# Patient Record
Sex: FEMALE | Race: BLACK OR AFRICAN AMERICAN | Employment: OTHER | URBAN - METROPOLITAN AREA
[De-identification: names, ages, dates, MRNs, and addresses within clinical notes are randomized per-mention and may not be internally consistent; named-entity substitution may affect disease eponyms.]

---

## 2017-02-21 ENCOUNTER — OFFICE VISIT (OUTPATIENT)
Dept: PAIN MANAGEMENT | Age: 82
End: 2017-02-21

## 2017-02-21 VITALS
BODY MASS INDEX: 24.37 KG/M2 | HEART RATE: 92 BPM | SYSTOLIC BLOOD PRESSURE: 125 MMHG | WEIGHT: 151 LBS | DIASTOLIC BLOOD PRESSURE: 69 MMHG

## 2017-02-21 DIAGNOSIS — M51.37 DEGENERATION OF LUMBAR OR LUMBOSACRAL INTERVERTEBRAL DISC: ICD-10-CM

## 2017-02-21 DIAGNOSIS — M47.817 LUMBOSACRAL SPONDYLOSIS WITHOUT MYELOPATHY: ICD-10-CM

## 2017-02-21 DIAGNOSIS — Z71.89 COUNSELING AND COORDINATION OF CARE: Primary | ICD-10-CM

## 2017-02-21 DIAGNOSIS — M15.9 OSTEOARTHRITIS OF MULTIPLE JOINTS, UNSPECIFIED OSTEOARTHRITIS TYPE: ICD-10-CM

## 2017-02-21 DIAGNOSIS — G89.29 CHRONIC LOW BACK PAIN, UNSPECIFIED BACK PAIN LATERALITY, WITH SCIATICA PRESENCE UNSPECIFIED: Primary | ICD-10-CM

## 2017-02-21 DIAGNOSIS — M25.50 POLYARTHRALGIA: ICD-10-CM

## 2017-02-21 DIAGNOSIS — M43.16 SPONDYLOLISTHESIS OF LUMBAR REGION: ICD-10-CM

## 2017-02-21 DIAGNOSIS — M96.1 POSTLAMINECTOMY SYNDROME, LUMBAR REGION: ICD-10-CM

## 2017-02-21 DIAGNOSIS — M54.5 CHRONIC LOW BACK PAIN, UNSPECIFIED BACK PAIN LATERALITY, WITH SCIATICA PRESENCE UNSPECIFIED: Primary | ICD-10-CM

## 2017-02-21 DIAGNOSIS — M51.36 DDD (DEGENERATIVE DISC DISEASE), LUMBAR: ICD-10-CM

## 2017-02-21 DIAGNOSIS — M77.8 SUBSCAPULARIS TENDINITIS OF RIGHT SHOULDER: ICD-10-CM

## 2017-02-21 DIAGNOSIS — M46.1 SACROILIITIS (HCC): ICD-10-CM

## 2017-02-21 RX ORDER — MORPHINE SULFATE 30 MG/1
15-30 TABLET ORAL
Qty: 90 TAB | Refills: 0 | Status: SHIPPED | OUTPATIENT
Start: 2017-03-27 | End: 2017-04-17 | Stop reason: SDUPTHER

## 2017-02-21 RX ORDER — MORPHINE SULFATE 30 MG/1
15-30 TABLET ORAL
Qty: 90 TAB | Refills: 0 | Status: SHIPPED | OUTPATIENT
Start: 2017-02-25 | End: 2017-04-17 | Stop reason: SDUPTHER

## 2017-02-21 RX ORDER — FENTANYL 50 UG/1
1 PATCH TRANSDERMAL
Qty: 10 PATCH | Refills: 0 | Status: SHIPPED | OUTPATIENT
Start: 2017-03-15 | End: 2017-04-17 | Stop reason: SDUPTHER

## 2017-02-21 RX ORDER — FENTANYL 50 UG/1
1 PATCH TRANSDERMAL
Qty: 10 PATCH | Refills: 0 | Status: SHIPPED | OUTPATIENT
Start: 2017-04-14 | End: 2017-04-17 | Stop reason: SDUPTHER

## 2017-02-21 NOTE — PROGRESS NOTES
Nursing Notes    Patient presents to the office today in follow-up. Patient rates her pain at 4/10 on the numerical pain scale. Reviewed medications with counts as follows:    Rx Date filled Qty Dispensed Pill Count Last Dose Short     Fentanyl 50mcg 02/13/17 10 8 02/20/17 No    Morphine sulfate 30mg IR 01/26/17 90 14 This am  No                                     Comments:     POC UDS was not performed in office today    Any new labs or imaging since last appointment? NO    Have you been to an emergency room (ER) or urgent care clinic since your last visit? NO            Have you been hospitalized since your last visit? NO     If yes, where, when, and reason for visit? Have you seen or consulted any other health care providers outside of the [address] [place] [address]  since your last visit? NO     If yes, where, when, and reason for visit? HM deferred to pcp.

## 2017-02-21 NOTE — PROGRESS NOTES
Ms. Mendy Mccall attended the Education Class facilitated by Shaq Hines RN Clinical Supervisor. Objectives of this class are to review practice policies, protocols and the Controlled Substances Consent and Treatment Agreement, discuss \"what if\" scenarios, introduce staff, and provide an opportunity for questions and answers. Ultimately, goals for this class are for Ms. Kemp to:    · Be educated - Learn as much as possible about her pain and related treatment, our policies and the Controlled Substances Agreement. · Be responsible - Follow the providers advice regarding treatment recommendations, medications, and prescription information. · Be confident - Better manage her pain and return to a more functional lifestyle. At least 45 minutes was spent with the patient in face-to-face contact today.

## 2017-02-21 NOTE — PATIENT INSTRUCTIONS
1. Continue current plan with no evidence of addiction or diversion. Stable on current medication without adverse events. 2. Refill fentanyl 50 µg patch every 72 hours. Refill morphine IR 30 mg. Take half to 1 tablet up to 3 times daily as needed. 3. Discussed risks of addiction, dependency, and opioid induced hyperalgesia.    4. Return to clinic in 2 months with Dr. Marilyn Prabhakar

## 2017-02-21 NOTE — PROGRESS NOTES
HISTORY OF PRESENT ILLNESS  Ade Ribeiro is a 80 y.o. female    HPI: Ms. Leann Martines  returns today for f/u of chronic low back pain and multiple joint pain. 2 Prior lumbar surgeries. Lumbar epidural injections in the past with good improvement but temporarily. PT in the past with some improvement. Today is my first visit with Ms. Kemp. She is here today with her son. She is doing well with her current treatment plan with no new issues today. We will continue with her treatment plan unchanged. I will have her follow-up in 2 months for reassessment. Current medication management consists of Fentanyl 50 µg patch every 72 hours and morphine IR 30 mg 3 times daily as needed   Medications are helping with pain control and quality of life. Her pain is 5-6/10 with medication and 8/10 without. Pt describes pain as aching. Aggravating factors include standing and walking. Relieved with rest, medication, and avoiding painful activities. Current treatment is helping to improve general activity, mood, walking, sleep, enjoyment of life    In the past 30 days, the patient reports approximately 30-40% pain relief with current treatment/medications. She  is otherwise doing well with no other complaints today. She denies any adverse events including nausea, vomiting, dizziness, constipation, hallucinations, or seizures. Allergies   Allergen Reactions    Aspirin Nausea Only       Past Surgical History   Procedure Laterality Date    Hx bunionectomy       LEFT    Hx lumbar laminectomy  1994    Hx tonsil and adenoidectomy      Hx carpal tunnel release  2000    Hx cholecystectomy  12/09     GALLSTONE PANCREATITIS    Colonoscopy  5/29/12     Dr. Monica Reynaga, single polyp, 5 yr f/u         Review of Systems   Constitutional: Negative for chills, fever and weight loss. HENT: Negative for congestion and sore throat. Eyes: Negative for blurred vision and double vision.    Respiratory: Negative for cough, shortness of breath and wheezing. Cardiovascular: Negative for chest pain and palpitations. Gastrointestinal: Negative for constipation, diarrhea, heartburn, nausea and vomiting. Genitourinary: Negative. Musculoskeletal: Positive for back pain and joint pain. Negative for falls and neck pain. Neurological: Negative for dizziness, seizures, loss of consciousness and headaches. Endo/Heme/Allergies: Does not bruise/bleed easily. Psychiatric/Behavioral: Negative for depression. The patient is not nervous/anxious and does not have insomnia. Physical Exam   Constitutional: She is oriented to person, place, and time and well-developed, well-nourished, and in no distress. No distress. HENT:   Head: Normocephalic and atraumatic. Eyes: EOM are normal.   Pulmonary/Chest: Effort normal.   Neurological: She is alert and oriented to person, place, and time. Gait (using a walker) abnormal.   Skin: Skin is warm and dry. No rash noted. She is not diaphoretic. No erythema. Psychiatric: Mood, memory, affect and judgment normal.   Nursing note and vitals reviewed. ASSESSMENT:    1. Chronic low back pain, unspecified back pain laterality, with sciatica presence unspecified    2. Degeneration of lumbar or lumbosacral intervertebral disc    3. DDD (degenerative disc disease), lumbar    4. Osteoarthritis of multiple joints, unspecified osteoarthritis type    5. Lumbosacral spondylosis without myelopathy    6. Polyarthralgia    7. Postlaminectomy syndrome, lumbar region    8. Sacroiliitis (HCC)    9. Spondylolisthesis of lumbar region    10. Subscapularis tendinitis of right shoulder           [place] Prescription Monitoring Program was reviewed which does not demonstrate aberrancies and/or inconsistencies with regard to the historical prescribing of controlled medications to this patient by other providers. PLAN / Pt Instructions:  1.  Continue current plan with no evidence of addiction or diversion. Stable on current medication without adverse events. 2. Refill fentanyl 50 µg patch every 72 hours. Refill morphine IR 30 mg. Take half to 1 tablet up to 3 times daily as needed. 3. Discussed risks of addiction, dependency, and opioid induced hyperalgesia. 4. Return to clinic in 2 months with Dr. Justine Stephenson    Medications Ordered Today   Medications    fentaNYL (DURAGESIC) 50 mcg/hr PATCH     Si Patch by TransDERmal route every seventy-two (72) hours for 30 days. Max Daily Amount: 1 Patch. For chronic pain     Dispense:  10 Patch     Refill:  0    fentaNYL (DURAGESIC) 50 mcg/hr PATCH     Si Patch by TransDERmal route every seventy-two (72) hours. Max Daily Amount: 1 Patch. For chronic pain     Dispense:  10 Patch     Refill:  0    morphine IR (MS IR) 30 mg tablet     Sig: Take 0.5-1 Tabs by mouth three (3) times daily as needed for Pain (rescue) for up to 30 days. Max Daily Amount: 90 mg. Dispense:  90 Tab     Refill:  0    morphine IR (MS IR) 30 mg tablet     Sig: Take 0.5-1 Tabs by mouth three (3) times daily as needed for Pain (rescue) for up to 30 days. Max Daily Amount: 90 mg. Dispense:  90 Tab     Refill:  0       Pain medications prescribed with the objective of pain relief and improved physical and psychosocial function in this patient. Spent 25 minutes with patient today reviewing the treatment plan, goals of treatment plan, and limitations of the treatment plan, to include the potential for side effects from medications and procedures. Roger Yo, [address] 2017      Note: Please excuse any typographical errors. Voice recognition software was used for this note and may cause mistakes.

## 2017-02-21 NOTE — MR AVS SNAPSHOT
Visit Information Date & Time Provider Department Dept. Phone Encounter #  
 2/21/2017  1:20 PM Roegr Yo, [facility] [address] for Pain Management [phone] [id number] Follow-up Instructions Return in about 2 months (around 4/21/2017). Your Appointments 3/20/2017 10:00 AM  
Office Visit with Milagros Christy MD  
[facility] Appt Note: 5 month f/u MWV, HTN, DM, chol  
 [address]  
  
   
 [address] [facility] Upcoming Health Maintenance Date Due  
 MEDICARE YEARLY EXAM 1/23/2016 EYE EXAM RETINAL OR DILATED Q1 1/13/2017 ZOSTER VACCINE AGE 60> 4/5/2017* HEMOGLOBIN A1C Q6M 4/19/2017 MICROALBUMIN Q1 10/19/2017 LIPID PANEL Q1 10/19/2017 GLAUCOMA SCREENING Q2Y 1/13/2018 DTaP/Tdap/Td series (2 - Td) 7/5/2022 *Topic was postponed. The date shown is not the original due date. Allergies as of 2/21/2017  Review Complete On: 2/21/2017 By: Santos Tejada LPN Severity Noted Reaction Type Reactions Aspirin    Nausea Only Current Immunizations  Reviewed on 10/6/2015 Name Date Influenza High Dose Vaccine PF 10/19/2016, 10/6/2015 12:25 PM  
 Influenza Vaccine 9/25/2014 Influenza Vaccine Split 10/25/2012  1:24 PM  
 Pneumococcal Conjugate (PCV-13) 10/19/2016  3:50 PM  
 Pneumococcal Vaccine (Unspecified Type) 11/18/2009 TDAP Vaccine 7/5/2012 Not reviewed this visit You Were Diagnosed With   
  
 Codes Comments Chronic low back pain, unspecified back pain laterality, with sciatica presence unspecified    -  Primary ICD-10-CM: M54.5, G89.29 ICD-9-CM: 724.2, 338.29  Degeneration of lumbar or lumbosacral intervertebral disc     ICD-10-CM: M51.37 
ICD-9-CM: 722.52   
 DDD (degenerative disc disease), lumbar     ICD-10-CM: M51.36 
ICD-9-CM: 722.52   
 Osteoarthritis of multiple joints, unspecified osteoarthritis type     ICD-10-CM: M15.9 ICD-9-CM: 715.89 Lumbosacral spondylosis without myelopathy     ICD-10-CM: N44.398 ICD-9-CM: 721.3 Polyarthralgia     ICD-10-CM: M25.50 ICD-9-CM: 719.49 Postlaminectomy syndrome, lumbar region     ICD-10-CM: M96.1 ICD-9-CM: 722.83 Sacroiliitis ([address]     ICD-10-CM: M46.1 ICD-9-CM: 720.2 Spondylolisthesis of lumbar region     ICD-10-CM: M43.16 
ICD-9-CM: 738.4 Subscapularis tendinitis of right shoulder     ICD-10-CM: M75.81 ICD-9-CM: 726.10 Vitals BP Pulse Weight(growth percentile) BMI OB Status Smoking Status 125/69 (BP 1 Location: Left arm, BP Patient Position: Sitting) 92 151 lb (68.5 kg) 24.37 kg/m2 Postmenopausal Never Smoker BMI and BSA Data Body Mass Index Body Surface Area  
 24.37 kg/m 2 1.79 m 2 Preferred Pharmacy Pharmacy Name Phone [facility] [address], [address] [phone] Your Updated Medication List  
  
   
This list is accurate as of: 2/21/17  1:53 PM.  Always use your most recent med list. amLODIPine 10 mg tablet Commonly known as:  Maritzaene Post Take 1 Tab by mouth daily. Blood-Glucose Meter monitoring kit Commonly known as:  Alber Hubbard Use to test blood sugar daily or as directed  
  
 cromolyn 4 % ophthalmic solution Commonly known as:  OPTICROM Administer 1 Drop to both eyes. diclofenac 3 % topical gel Commonly known as:  Tony Blackwell Apply  to affected area two (2) times daily as needed for Pain.  
  
 ergocalciferol 50,000 unit capsule Commonly known as:  VITAMIN D2 Take 1 Cap by mouth every seven (7) days. * fentaNYL 50 mcg/hr PATCH Commonly known as:  DURAGESIC  
1 Patch by TransDERmal route every seventy-two (72) hours. Max Daily Amount: 1 Patch. For chronic pain  * fentaNYL 50 mcg/hr PATCH  
 Commonly known as:  DURAGESIC  
1 Patch by TransDERmal route every seventy-two (72) hours for 30 days. Max Daily Amount: 1 Patch. For chronic pain Start taking on:  3/15/2017 * fentaNYL 50 mcg/hr PATCH Commonly known as:  DURAGESIC  
1 Patch by TransDERmal route every seventy-two (72) hours. Max Daily Amount: 1 Patch. For chronic pain Start taking on:  4/14/2017  
  
 furosemide 40 mg tablet Commonly known as:  LASIX Take 1 Tab by mouth daily. HYDROcodone-acetaminophen 5-325 mg per tablet Commonly known as:  Thelbert [place] Take 1 Tab by Mouth Every 4 Hours As Needed for Pain. JANUVIA 50 mg tablet Generic drug:  SITagliptin TAKE 1 TABLET BY MOUTH EVERY DAY Lancets Misc Commonly known as:  ONETOUCH ULTRASOFT LANCETS Use to test blood sugar daily or as directed  
  
 lidocaine 5 % ointment Commonly known as:  XYLOCAINE  
USE DAILY AS NEEDED FOR DRESSING CHANGES * morphine IR 30 mg tablet Commonly known as:  MS IR Take 0.5-1 Tabs by mouth three (3) times daily as needed for Pain (rescue) for up to 30 days. Max Daily Amount: 90 mg.  
  
 * morphine IR 30 mg tablet Commonly known as:  MS IR Take 0.5-1 Tabs by mouth three (3) times daily as needed for Pain (rescue) for up to 30 days. Max Daily Amount: 90 mg. Start taking on:  2/25/2017 * morphine IR 30 mg tablet Commonly known as:  MS IR Take 0.5-1 Tabs by mouth three (3) times daily as needed for Pain (rescue) for up to 30 days. Max Daily Amount: 90 mg. Start taking on:  3/27/2017  
  
 mupirocin calcium 2 % topical cream  
Commonly known as:  [employer] [facility] Apply  to affected area two (2) times a day. omeprazole 40 mg capsule Commonly known as:  PRILOSEC Take 1 Cap by mouth daily. ONETOUCH ULTRA TEST strip Generic drug:  glucose blood VI test strips TEST BLOOD SUGAR DAILY OR AS DIRECTED  
  
 potassium chloride 20 mEq tablet Commonly known as:  K-DUR, KLOR-CON  
 TAKE 1 TABLET BY MOUTH TWICE DAILY  
  
 rivastigmine 4.6 mg/24 hr patch Commonly known as:  EXELON  
1 Patch by TransDERmal route daily. tolterodine ER 4 mg ER capsule Commonly known as:  Bloomer Phan Take 1 Cap by mouth daily. ursodiol 300 mg capsule Commonly known as:  ACTIGALL TAKE 1 CAPSULE BY MOUTH TWICE DAILY  
  
 valsartan-hydroCHLOROthiazide 320-25 mg per tablet Commonly known as:  DIOVAN-HCT  
TAKE 1 TABLET BY MOUTH DAILY * Notice: This list has 6 medication(s) that are the same as other medications prescribed for you. Read the directions carefully, and ask your doctor or other care provider to review them with you. Prescriptions Printed Refills  
 fentaNYL (DURAGESIC) 50 mcg/hr PATCH 0 Starting on: 3/15/2017 Si Patch by TransDERmal route every seventy-two (72) hours for 30 days. Max Daily Amount: 1 Patch. For chronic pain  
 Class: Print Route: TransDERmal  
 fentaNYL (DURAGESIC) 50 mcg/hr PATCH 0 Starting on: 2017 Si Patch by TransDERmal route every seventy-two (72) hours. Max Daily Amount: 1 Patch. For chronic pain  
 Class: Print Route: TransDERmal  
 morphine IR (MS IR) 30 mg tablet 0 Starting on: 2017 Sig: Take 0.5-1 Tabs by mouth three (3) times daily as needed for Pain (rescue) for up to 30 days. Max Daily Amount: 90 mg.  
 Class: Print Route: Oral  
 morphine IR (MS IR) 30 mg tablet 0 Starting on: 3/27/2017 Sig: Take 0.5-1 Tabs by mouth three (3) times daily as needed for Pain (rescue) for up to 30 days. Max Daily Amount: 90 mg.  
 Class: Print Route: Oral  
  
Follow-up Instructions Return in about 2 months (around 2017). Patient Instructions 1. Continue current plan with no evidence of addiction or diversion. Stable on current medication without adverse events. 2. Refill fentanyl 50 µg patch every 72 hours. Refill morphine IR 30 mg. Take half to 1 tablet up to 3 times daily as needed. 3. Discussed risks of addiction, dependency, and opioid induced hyperalgesia. 4. Return to clinic in 2 months with Dr. Rodger Monteiro Introducing [facility]! Jessica Linda introduces [employer] patient portal. Now you can access parts of your medical record, email your doctor's office, and request medication refills online. 1. In your internet browser, go to [email or web address] 2. Click on the First Time User? Click Here link in the Sign In box. You will see the New Member Sign Up page. 3. Enter your [employer] Access Code exactly as it appears below. You will not need to use this code after youve completed the sign-up process. If you do not sign up before the expiration date, you must request a new code. · [employer] Access Code: [email or web address] Expires: 5/22/2017  1:53 PM 
 
4. Enter the last four digits of your Social Security Number (xxxx) and Date of Birth (mm/dd/yyyy) as indicated and click Submit. You will be taken to the next sign-up page. 5. Create a [employer] ID. This will be your [employer] login ID and cannot be changed, so think of one that is secure and easy to remember. 6. Create a [employer] password. You can change your password at any time. 7. Enter your Password Reset Question and Answer. This can be used at a later time if you forget your password. 8. Enter your e-mail address. You will receive e-mail notification when new information is available in [address] 9. Click Sign Up. You can now view and download portions of your medical record. 10. Click the Download Summary menu link to download a portable copy of your medical information. If you have questions, please visit the Frequently Asked Questions section of the [employer] website. Remember, [employer] is NOT to be used for urgent needs. For medical emergencies, dial 911. Now available from your iPhone and Android! Please provide this summary of care documentation to your next provider. Your primary care clinician is listed as [facility]. If you have any questions after today's visit, please call [phone].

## 2017-03-20 ENCOUNTER — OFFICE VISIT (OUTPATIENT)
Dept: INTERNAL MEDICINE CLINIC | Age: 82
End: 2017-03-20

## 2017-03-20 VITALS
HEART RATE: 74 BPM | SYSTOLIC BLOOD PRESSURE: 136 MMHG | RESPIRATION RATE: 18 BRPM | HEIGHT: 66 IN | BODY MASS INDEX: 24.4 KG/M2 | DIASTOLIC BLOOD PRESSURE: 66 MMHG | OXYGEN SATURATION: 96 % | WEIGHT: 151.8 LBS | TEMPERATURE: 97.5 F

## 2017-03-20 DIAGNOSIS — I10 ESSENTIAL HYPERTENSION: Chronic | ICD-10-CM

## 2017-03-20 DIAGNOSIS — H91.92 HEARING LOSS, LEFT: ICD-10-CM

## 2017-03-20 DIAGNOSIS — Z76.0 MEDICATION REFILL: ICD-10-CM

## 2017-03-20 DIAGNOSIS — Z13.39 SCREENING FOR ALCOHOLISM: ICD-10-CM

## 2017-03-20 DIAGNOSIS — E11.9 TYPE 2 DIABETES MELLITUS WITHOUT COMPLICATION, WITHOUT LONG-TERM CURRENT USE OF INSULIN (HCC): Chronic | ICD-10-CM

## 2017-03-20 DIAGNOSIS — E78.00 HYPERCHOLESTEREMIA: Chronic | ICD-10-CM

## 2017-03-20 DIAGNOSIS — Z00.00 ROUTINE GENERAL MEDICAL EXAMINATION AT A HEALTH CARE FACILITY: Primary | ICD-10-CM

## 2017-03-20 DIAGNOSIS — H61.22 IMPACTED CERUMEN OF LEFT EAR: ICD-10-CM

## 2017-03-20 LAB
GLUCOSE POC: 119 MG/DL
HBA1C MFR BLD HPLC: 5.6 %

## 2017-03-20 RX ORDER — AMLODIPINE BESYLATE 10 MG/1
10 TABLET ORAL DAILY
Qty: 30 TAB | Refills: 11 | Status: SHIPPED | OUTPATIENT
Start: 2017-03-20 | End: 2017-05-16 | Stop reason: SDUPTHER

## 2017-03-20 RX ORDER — OMEPRAZOLE 40 MG/1
40 CAPSULE, DELAYED RELEASE ORAL DAILY
Qty: 30 CAP | Refills: 11 | Status: SHIPPED | OUTPATIENT
Start: 2017-03-20 | End: 2018-04-02 | Stop reason: SDUPTHER

## 2017-03-20 RX ORDER — RIVASTIGMINE 4.6 MG/24H
1 PATCH, EXTENDED RELEASE TRANSDERMAL DAILY
Qty: 30 PATCH | Refills: 11 | Status: SHIPPED | OUTPATIENT
Start: 2017-03-20 | End: 2017-05-16 | Stop reason: SDUPTHER

## 2017-03-20 RX ORDER — FUROSEMIDE 40 MG/1
40 TABLET ORAL DAILY
Qty: 30 TAB | Refills: 11 | Status: SHIPPED | OUTPATIENT
Start: 2017-03-20 | End: 2018-07-18 | Stop reason: SDUPTHER

## 2017-03-20 RX ORDER — TOLTERODINE 4 MG/1
4 CAPSULE, EXTENDED RELEASE ORAL DAILY
Qty: 30 CAP | Refills: 11 | Status: SHIPPED | OUTPATIENT
Start: 2017-03-20 | End: 2017-05-13 | Stop reason: SDUPTHER

## 2017-03-20 RX ORDER — ERGOCALCIFEROL 1.25 MG/1
50000 CAPSULE ORAL
Qty: 4 CAP | Refills: 11 | Status: SHIPPED | OUTPATIENT
Start: 2017-03-20 | End: 2018-04-02 | Stop reason: SDUPTHER

## 2017-03-20 NOTE — PROGRESS NOTES
Chief Complaint   Patient presents with    Annual Wellness Visit    Hypertension    Diabetes    Cholesterol Problem       Pt preferred language for health care discussion is english. Is someone accompanying this pt? son    Is the patient using any DME equipment during [address]? Wheeled seated wlaker    Depression Screening completed. Yes    Learning Assessment completed. Yes    Abuse Screening completed. Yes    Health Maintenance reviewed and discussed per provider. Yes    Pt is due for Eye exam.  Please order/place referral if appropriate. Advance Directive:  1. Do you have an advance directive in place? Patient Reply:no    2. If not, would you like material regarding how to put one in place? Patient Reply: No    Coordination of Care:  1. Have you been to the ER, urgent care clinic since your last visit? Hospitalized since your last visit? no    2. Have you seen or consulted any other health care providers outside of the [address] [place] [address] since your last visit? Include any pap smears or colon screening.  no

## 2017-03-20 NOTE — MR AVS SNAPSHOT
Visit Information Date & Time Provider Department Dept. Phone Encounter #  
 3/20/2017 10:00 AM Maday MarieNaveed [facility] [phone] [id number] Follow-up Instructions Return in about 6 months (around 9/20/2017) for HTN, DM, cholesterol. Your Appointments 4/17/2017 10:45 AM  
Follow Up with Mya Burrows MD  
[facility] [address] [facility]) Appt Note: 2 MON F/U WITH GS PER RC. ...2/21/17. ...to  
 Tamara and Raghav Kari [id number]  
[phone] [facility] [id number] Upcoming Health Maintenance Date Due  
 MEDICARE YEARLY EXAM 1/23/2016 EYE EXAM RETINAL OR DILATED Q1 1/13/2017 HEMOGLOBIN A1C Q6M 4/19/2017 ZOSTER VACCINE AGE 60> 4/5/2017* MICROALBUMIN Q1 10/19/2017 GLAUCOMA SCREENING Q2Y 1/13/2018 LIPID PANEL Q1 3/7/2018 DTaP/Tdap/Td series (2 - Td) 7/5/2022 *Topic was postponed. The date shown is not the original due date. Allergies as of 3/20/2017  Review Complete On: 3/20/2017 By: Maday Salazar MD  
  
 Severity Noted Reaction Type Reactions Aspirin    Nausea Only Current Immunizations  Reviewed on 10/6/2015 Name Date Influenza High Dose Vaccine PF 10/19/2016, 10/6/2015 12:25 PM  
 Influenza Vaccine 9/25/2014 Influenza Vaccine Split 10/25/2012  1:24 PM  
 Pneumococcal Conjugate (PCV-13) 10/19/2016  3:50 PM  
 Pneumococcal Vaccine (Unspecified Type) 11/18/2009 TDAP Vaccine 7/5/2012 Not reviewed this visit You Were Diagnosed With   
  
 Codes Comments Routine general medical examination at a health care facility    -  Primary ICD-10-CM: Z00.00 ICD-9-CM: V70.0 Screening for alcoholism     ICD-10-CM: Z13.89 ICD-9-CM: V79.1 Type 2 diabetes mellitus without complication, without long-term current use of insulin (HCC)     ICD-10-CM: E11.9 ICD-9-CM: 250.00 Essential hypertension     ICD-10-CM: I10 
ICD-9-CM: 401.9 Hypercholesteremia     ICD-10-CM: E78.00 ICD-9-CM: 272.0 Medication refill     ICD-10-CM: Z76.0 ICD-9-CM: V68.1 Impacted cerumen of left ear     ICD-10-CM: H61.22 
ICD-9-CM: 380.4 Hearing loss, left     ICD-10-CM: H91.92 
ICD-9-CM: 389. 9 Vitals BP Pulse Temp Resp Height(growth percentile) Weight(growth percentile) 136/66 74 97.5 °F (36.4 °C) (Oral) 18 5' 6\" (1.676 m) 151 lb 12.8 oz (68.9 kg) SpO2 BMI OB Status Smoking Status 96% 24.5 kg/m2 Postmenopausal Never Smoker BMI and BSA Data Body Mass Index Body Surface Area 24.5 kg/m 2 1.79 m 2 Preferred Pharmacy Pharmacy Name [facility] [phone] Your Updated Medication List  
  
   
This list is accurate as of: 3/20/17 11:35 AM.  Always use your most recent med list. amLODIPine 10 mg tablet Commonly known as:  Mary Jo Tello Take 1 Tab by mouth daily. Blood-Glucose Meter monitoring kit Commonly known as:  Brice Stacy Use to test blood sugar daily or as directed  
  
 cromolyn 4 % ophthalmic solution Commonly known as:  OPTICROM Administer 1 Drop to both eyes. diclofenac 3 % topical gel Commonly known as:  Diego Stacy Apply  to affected area two (2) times daily as needed for Pain.  
  
 ergocalciferol 50,000 unit capsule Commonly known as:  VITAMIN D2 Take 1 Cap by mouth every seven (7) days. * fentaNYL 50 mcg/hr PATCH Commonly known as:  DURAGESIC  
1 Patch by TransDERmal route every seventy-two (72) hours for 30 days. Max Daily Amount: 1 Patch. For chronic pain * fentaNYL 50 mcg/hr PATCH Commonly known as:  DURAGESIC  
1 Patch by TransDERmal route every seventy-two (72) hours. Max Daily Amount: 1 Patch. For chronic pain Start taking on:  4/14/2017 furosemide 40 mg tablet Commonly known as:  LASIX Take 1 Tab by mouth daily. HYDROcodone-acetaminophen 5-325 mg per tablet Commonly known as:  Almaz Petri Take 1 Tab by Mouth Every 4 Hours As Needed for Pain. Lancets Misc Commonly known as:  ONETOUCH ULTRASOFT LANCETS Use to test blood sugar daily or as directed  
  
 lidocaine 5 % ointment Commonly known as:  XYLOCAINE  
USE DAILY AS NEEDED FOR DRESSING CHANGES * morphine IR 30 mg tablet Commonly known as:  MS IR Take 0.5-1 Tabs by mouth three (3) times daily as needed for Pain (rescue) for up to 30 days. Max Daily Amount: 90 mg.  
  
 * morphine IR 30 mg tablet Commonly known as:  MS IR Take 0.5-1 Tabs by mouth three (3) times daily as needed for Pain (rescue) for up to 30 days. Max Daily Amount: 90 mg. Start taking on:  3/27/2017  
  
 mupirocin calcium 2 % topical cream  
Commonly known as:  [employer] Apply  to affected area two (2) times a day. omeprazole 40 mg capsule Commonly known as:  PRILOSEC Take 1 Cap by mouth daily. ONETOUCH ULTRA TEST strip Generic drug:  glucose blood VI test strips TEST BLOOD SUGAR DAILY OR AS DIRECTED  
  
 potassium chloride 20 mEq tablet Commonly known as:  K-DUR, KLOR-CON  
TAKE 1 TABLET BY MOUTH TWICE DAILY  
  
 rivastigmine 4.6 mg/24 hr patch Commonly known as:  EXELON  
1 Patch by TransDERmal route daily. SITagliptin 50 mg tablet Commonly known as:  Merline Pesa TAKE 1 TABLET BY MOUTH EVERY DAY  
  
 tolterodine ER 4 mg ER capsule Commonly known as:  Marsa Valerio Take 1 Cap by mouth daily. ursodiol 300 mg capsule Commonly known as:  ACTIGALL TAKE 1 CAPSULE BY MOUTH TWICE DAILY  
  
 valsartan-hydroCHLOROthiazide 320-25 mg per tablet Commonly known as:  DIOVAN-HCT  
TAKE 1 TABLET BY MOUTH DAILY * Notice: This list has 4 medication(s) that are the same as other medications prescribed for you.  Read the directions carefully, and ask your doctor or other care provider to review them with you. Prescriptions Sent to Pharmacy Refills  
 amLODIPine (NORVASC) 10 mg tablet 11 Sig: Take 1 Tab by mouth daily. Class: Normal  
 Pharmacy: [address], [address] [place] [address] #: [phone] Route: Oral  
 ergocalciferol (VITAMIN D2) 50,000 unit capsule 11 Sig: Take 1 Cap by mouth every seven (7) days. Class: Normal  
 Pharmacy: [address], [address] [place] [address] #: [phone] Route: Oral  
 furosemide (LASIX) 40 mg tablet 11 Sig: Take 1 Tab by mouth daily. Class: Normal  
 Pharmacy: [address], [address] [place] [address] #: [phone] Route: Oral  
 rivastigmine (EXELON) 4.6 mg/24 hr patch 11 Si Patch by TransDERmal route daily. Class: Normal  
 Pharmacy: [address], [address] [place] [address] Ph #: [phone] Route: TransDERmal  
 tolterodine ER (DETROL LA) 4 mg ER capsule 11 Sig: Take 1 Cap by mouth daily. Class: Normal  
 Pharmacy: [address], [address] [place] [address] #: [phone] Route: Oral  
 omeprazole (PRILOSEC) 40 mg capsule 11 Sig: Take 1 Cap by mouth daily. Class: Normal  
 Pharmacy: [facility] [address], [address] [place] [address] #: [phone] Route: Oral  
 SITagliptin (JANUVIA) 50 mg tablet 11 Sig: TAKE 1 TABLET BY MOUTH EVERY DAY Class: Normal  
 Pharmacy: [address], [address] [place] [address] #: [phone] We Performed the Following AMB POC GLUCOSE BLOOD, BY GLUCOSE MONITORING DEVICE [90952 CPT(R)] AMB POC HEMOGLOBIN A1C [87666 CPT(R)] KS REMOVE IMPACTED EAR WAX [94305 CPT(R)] Follow-up Instructions Return in about 6 months (around 9/20/2017) for HTN, DM, cholesterol. Patient Instructions Schedule of Personalized Health Plan (Provide Copy to Patient) The best way to stay healthy is to live a healthy lifestyle. A healthy lifestyle includes regular exercise, eating a well-balanced diet, keeping a healthy weight and not smoking. Regular physical exams and screening tests are another important way to take care of yourself. Preventive exams provided by health care providers can find health problems early when treatment works best and can keep you from getting certain diseases or illnesses. Preventive services include exams, lab tests, screenings, shots, monitoring and information to help you take care of your own health. All people over 65 should have a pneumonia shot. Pneumonia shots are usually only needed once in a lifetime unless your doctor decides differently. All people over 65 should have a yearly flu shot. People over 65 are at medium to high risk for Hepatitis B. Three shots are needed for complete protection. In addition to your physical exam, some screening tests are recommended: 
 
Bone mass measurement (dexa scan) is recommended every two years Diabetes Mellitus screening is recommended every year. Glaucoma is an eye disease caused by high pressure in the eye. An eye exam is recommended every year. Cardiovascular screening tests that check your cholesterol and other blood fat (lipid) levels are recommended every five years. Colorectal Cancer screening tests help to find pre-cancerous polyps (growths in the colon) so they can be removed before they turn into cancer. Tests ordered for screening depend on your personal and family history risk factors. Screening for Breast Cancer is recommended yearly with a mammogram. 
 
Screening for Cervical Cancer is recommended every two years (annually for certain risk factors, such as previous history of STD or abnormal PAP in past 7 years), with a Pelvic Exam with PAP Here is a list of your current Health Maintenance items with a due date: 
Health Maintenance Topic Date Due  MEDICARE YEARLY EXAM  01/23/2016  
 EYE EXAM RETINAL OR DILATED Q1  01/13/2017  
 HEMOGLOBIN A1C Q6M  04/19/2017  ZOSTER VACCINE AGE 60>  04/05/2017 (Originally 1[birth year]26/1986)  MICROALBUMIN Q1  10/19/2017  GLAUCOMA SCREENING Q2Y  01/13/2018  LIPID PANEL Q1  03/07/2018  DTaP/Tdap/Td series (2 - Td) 07/05/2022  
 OSTEOPOROSIS SCREENING (DEXA)  Completed  Pneumococcal 65+ Low/Medium Risk  Completed  INFLUENZA AGE 9 TO ADULT  Completed Introducing hospitals & HEALTH SERVICES! Gregg Del Rio introduces [employer] patient portal. Now you can access parts of your medical record, email your doctor's office, and request medication refills online. 1. In your internet browser, go to [email or web address] 2. Click on the First Time User? Click Here link in the Sign In box. You will see the New Member Sign Up page. 3. Enter your [employer] Access Code exactly as it appears below. You will not need to use this code after youve completed the sign-up process. If you do not sign up before the expiration date, you must request a new code. · [employer] Access Code: [email or web address] Expires: 5/22/2017  2:53 PM 
 
4. Enter the last four digits of your Social Security Number (xxxx) and Date of Birth (mm/dd/yyyy) as indicated and click Submit. You will be taken to the next sign-up page. 5. Create a [employer] ID. This will be your [employer] login ID and cannot be changed, so think of one that is secure and easy to remember. 6. Create a [employer] password. You can change your password at any time. 7. Enter your Password Reset Question and Answer. This can be used at a later time if you forget your password. 8. Enter your e-mail address. You will receive e-mail notification when new information is available in [address] 9. Click Sign Up. You can now view and download portions of your medical record. 10. Click the Download Summary menu link to download a portable copy of your medical information. If you have questions, please visit the Frequently Asked Questions section of the [employer] website. Remember, [employer] is NOT to be used for urgent needs. For medical emergencies, dial 911. Now available from your iPhone and Android! Please provide this summary of care documentation to your next provider. Your primary care clinician is listed as [facility]. If you have any questions after today's visit, please call [phone].

## 2017-03-20 NOTE — PATIENT INSTRUCTIONS
Schedule of Personalized Health Plan  (Provide Copy to Patient)  The best way to stay healthy is to live a healthy lifestyle. A healthy lifestyle includes regular exercise, eating a well-balanced diet, keeping a healthy weight and not smoking. Regular physical exams and screening tests are another important way to take care of yourself. Preventive exams provided by health care providers can find health problems early when treatment works best and can keep you from getting certain diseases or illnesses. Preventive services include exams, lab tests, screenings, shots, monitoring and information to help you take care of your own health. All people over 65 should have a pneumonia shot. Pneumonia shots are usually only needed once in a lifetime unless your doctor decides differently. All people over 65 should have a yearly flu shot. People over 65 are at medium to high risk for Hepatitis B. Three shots are needed for complete protection. In addition to your physical exam, some screening tests are recommended:    Bone mass measurement (dexa scan) is recommended every two years  Diabetes Mellitus screening is recommended every year. Glaucoma is an eye disease caused by high pressure in the eye. An eye exam is recommended every year. Cardiovascular screening tests that check your cholesterol and other blood fat (lipid) levels are recommended every five years. Colorectal Cancer screening tests help to find pre-cancerous polyps (growths in the colon) so they can be removed before they turn into cancer. Tests ordered for screening depend on your personal and family history risk factors.     Screening for Breast Cancer is recommended yearly with a mammogram.    Screening for Cervical Cancer is recommended every two years (annually for certain risk factors, such as previous history of STD or abnormal PAP in past 7 years), with a Pelvic Exam with PAP    Here is a list of your current Health Maintenance items with a due date:  Health Maintenance   Topic Date Due    MEDICARE YEARLY EXAM  01/23/2016    EYE EXAM RETINAL OR DILATED Q1  01/13/2017    HEMOGLOBIN A1C Q6M  04/19/2017    ZOSTER VACCINE AGE 60>  04/05/2017 (Originally 1/26/1986)    MICROALBUMIN Q1  10/19/2017    GLAUCOMA SCREENING Q2Y  01/13/2018    LIPID PANEL Q1  03/07/2018    DTaP/Tdap/Td series (2 - Td) 07/05/2022    OSTEOPOROSIS SCREENING (DEXA)  Completed    Pneumococcal 65+ Low/Medium Risk  Completed    INFLUENZA AGE 9 TO ADULT  Completed

## 2017-03-20 NOTE — PROGRESS NOTES
HISTORY OF PRESENT ILLNESS  Jenny Dial is a 80 y.o. female. Visit Vitals    /66    Pulse 74    Temp 97.5 °F (36.4 °C) (Oral)    Resp 18    Ht 5' 6\" (1.676 m)    Wt 151 lb 12.8 oz (68.9 kg)    SpO2 96%    BMI 24.5 kg/m2       Hypertension    The history is provided by the patient. This is a chronic problem. The current episode started more than 1 week ago. The problem has not changed since onset. Pertinent negatives include no chest pain, no palpitations and no shortness of breath. There are no associated agents to hypertension. Risk factors include postmenopause, obesity and a sedentary lifestyle. Diabetes   The history is provided by the patient. This is a chronic problem. The current episode started more than 1 week ago. The problem occurs daily. The problem has not changed since onset. Pertinent negatives include no chest pain and no shortness of breath. Exacerbated by: diet. The symptoms are relieved by medications (diet). Cholesterol Problem   The history is provided by the patient. This is a chronic problem. The current episode started more than 1 week ago. The problem occurs daily. The problem has not changed since onset. Pertinent negatives include no chest pain and no shortness of breath. Exacerbated by: diet. Relieved by: diet. Review of Systems   Constitutional: Negative. HENT: Positive for hearing loss. Left ear feels funny   Respiratory: Negative for shortness of breath. Cardiovascular: Negative for chest pain and palpitations. Physical Exam   Constitutional: She is oriented to person, place, and time. She appears well-developed and well-nourished. No distress. HENT:   Abrasion left pinna with some sloughing skin but no signs of active infection. Cerumen impaction noted   Cardiovascular: Normal rate and regular rhythm. Pulmonary/Chest: Effort normal and breath sounds normal.   Musculoskeletal: She exhibits no edema.    Neurological: She is alert and oriented to person, place, and time. Skin: Skin is warm and dry. She is not diaphoretic. Psychiatric: She has a normal mood and affect. Mild cognitive impairment   Nursing note and vitals reviewed. ASSESSMENT and PLAN    ICD-10-CM ICD-9-CM                   Type 2 diabetes mellitus without complication, without long-term current use of insulin (HCC) E11.9 250.00 AMB POC GLUCOSE BLOOD, BY GLUCOSE MONITORING DEVICE      AMB POC HEMOGLOBIN A1C    Essential hypertension I10 401.9     Hypercholesteremia E78.00 272.0     Medication refill Z76.0 V68.1 amLODIPine (NORVASC) 10 mg tablet      ergocalciferol (VITAMIN D2) 50,000 unit capsule      furosemide (LASIX) 40 mg tablet      rivastigmine (EXELON) 4.6 mg/24 hr patch      tolterodine ER (DETROL LA) 4 mg ER capsule      omeprazole (PRILOSEC) 40 mg capsule      SITagliptin (JANUVIA) 50 mg tablet    Impacted cerumen of left ear H61.22 380.4 MT REMOVE IMPACTED EAR WAX    Hearing loss, left H91.92 389.9 MT REMOVE IMPACTED EAR WAX     Subjective:       Objective:     Visit Vitals    /66    Pulse 74    Temp 97.5 °F (36.4 °C) (Oral)    Resp 18    Ht 5' 6\" (1.676 m)    Wt 151 lb 12.8 oz (68.9 kg)    SpO2 96%    BMI 24.5 kg/m2       General:    Right Ear: N[place] Left Ear: [place] After removal: bilateral TM's and external ear canals normal, cerumen removed. Oropharynx:   normal.   Neck:  . Assessment/Plan:     Cerumen Impaction, without otitis externa. 1. Cerumen removed by flushing. 2. Care instructions given. 3. Home treatment: none  4. Followup as needed. BP controlled. update lab  F/u 5-6 months              The following is a separate encounter visit:  . This is a Subsequent Medicare Annual Wellness Visit providing Personalized Prevention Plan Services (PPPS) (Performed 12 months after initial AWV and PPPS )    I have reviewed the patient's medical history in detail and updated the computerized patient record.      History     Past Medical History:   Diagnosis Date    Abdominal pain, other specified site 10/11    with abnml LFT, no etiology found    Abnormal LFTs     Anemia     Bile duct stone 11/13    Dr. Sincere Mcdermott of great toe of right foot 3/1/2013    Cholangitis 4/14/16    Chondromalacia of right shoulder 11/6/2014    Colon polyps     CRI (chronic renal insufficiency)     CVA (cerebral infarction)     LACUNAR    DDD (degenerative disc disease), lumbar 11/6/2014    Dementia     Diabetes ([address]     DJD (degenerative joint disease) of lumbar spine 1994    DJD (degenerative joint disease), multiple sites 11/6/2014    Gallstone pancreatitis     stent placed, June 2012    GERD (gastroesophageal reflux disease)     History of bone density study 1/07    wnl    Hypercholesterolemia     Hypertension     Lumbar arthropathy 11/6/2014    Lumbar nerve root impingement 11/6/2014    Lumbar post-laminectomy syndrome 11/6/2014    Pancolonic diverticulosis     Polyarthralgia 11/6/2014    Post laminectomy syndrome 2002    S/P lumbar spinal fusion 11/6/2014    Sacroiliitis ([address] 11/6/2014    Spondylolisthesis of lumbar region 11/6/2014    Subscapularis tendinitis of right shoulder 11/6/2014      Past Surgical History:   Procedure Laterality Date    COLONOSCOPY  5/29/12    Dr. Jackie Troncoso, single polyp, 5 yr f/u    HX BUNIONECTOMY      LEFT    HX CARPAL TUNNEL RELEASE  2000    HX CHOLECYSTECTOMY  12/09    GALLSTONE PANCREATITIS    HX LUMBAR LAMINECTOMY  1994    HX TONSIL AND ADENOIDECTOMY       Current Outpatient Prescriptions   Medication Sig Dispense Refill    amLODIPine (NORVASC) 10 mg tablet Take 1 Tab by mouth daily. 30 Tab 11    ergocalciferol (VITAMIN D2) 50,000 unit capsule Take 1 Cap by mouth every seven (7) days. 4 Cap 11    furosemide (LASIX) 40 mg tablet Take 1 Tab by mouth daily. 30 Tab 11    rivastigmine (EXELON) 4.6 mg/24 hr patch 1 Patch by TransDERmal route daily.  30 Patch 11    tolterodine ER (DETROL LA) 4 mg ER capsule Take 1 Cap by mouth daily. 30 Cap 11    omeprazole (PRILOSEC) 40 mg capsule Take 1 Cap by mouth daily. 30 Cap 11    SITagliptin (JANUVIA) 50 mg tablet TAKE 1 TABLET BY MOUTH EVERY DAY 30 Tab 11    fentaNYL (DURAGESIC) 50 mcg/hr PATCH 1 Patch by TransDERmal route every seventy-two (72) hours for 30 days. Max Daily Amount: 1 Patch. For chronic pain 10 Patch 0    [START ON 4/14/2017] fentaNYL (DURAGESIC) 50 mcg/hr PATCH 1 Patch by TransDERmal route every seventy-two (72) hours. Max Daily Amount: 1 Patch. For chronic pain 10 Patch 0    morphine IR (MS IR) 30 mg tablet Take 0.5-1 Tabs by mouth three (3) times daily as needed for Pain (rescue) for up to 30 days. Max Daily Amount: 90 mg. 90 Tab 0    [START ON 3/27/2017] morphine IR (MS IR) 30 mg tablet Take 0.5-1 Tabs by mouth three (3) times daily as needed for Pain (rescue) for up to 30 days. Max Daily Amount: 90 mg. 90 Tab 0    potassium chloride (K-DUR, KLOR-CON) 20 mEq tablet TAKE 1 TABLET BY MOUTH TWICE DAILY 60 Tab 11    valsartan-hydroCHLOROthiazide (DIOVAN-HCT) 320-25 mg per tablet TAKE 1 TABLET BY MOUTH DAILY 30 Tab 11    HYDROcodone-acetaminophen (NORCO) 5-325 mg per tablet Take 1 Tab by Mouth Every 4 Hours As Needed for Pain.  lidocaine (XYLOCAINE) 5 % ointment USE DAILY AS NEEDED FOR DRESSING CHANGES 50 g 5    ONETOUCH ULTRA TEST strip TEST BLOOD SUGAR DAILY OR AS DIRECTED 100 Strip 4    ursodiol (ACTIGALL) 300 mg capsule TAKE 1 CAPSULE BY MOUTH TWICE DAILY 60 Cap 5    cromolyn (OPTICROM) 4 % ophthalmic solution Administer 1 Drop to both eyes. 2    diclofenac sodium (SOLARAZE) 3 % topical gel Apply  to affected area two (2) times daily as needed for Pain. 50 g 5    mupirocin calcium (BACTROBAN) 2 % topical cream Apply  to affected area two (2) times a day.  15 g 5    Blood-Glucose Meter (ONE TOUCH ULTRA 2) monitoring kit Use to test blood sugar daily or as directed 1 Kit 0    Lancets (ONE TOUCH ULTRASOFT LANCETS) Misc Use to test blood sugar daily or as directed 1 Package 11     Allergies   Allergen Reactions    Aspirin Nausea Only     Family History   Problem Relation Age of Onset    Hypertension Mother     Hypertension Father     Stroke Father      Social History   Substance Use Topics    Smoking status: Never Smoker    Smokeless tobacco: Never Used    Alcohol use No     Patient Active Problem List   Diagnosis Code    Encounter for long-term (current) use of other medications Z79.899    Lumbago M54.5    Postlaminectomy syndrome, lumbar region M96.1    Degeneration of lumbar or lumbosacral intervertebral disc M51.37    Lumbosacral spondylosis without myelopathy M47.817    Thoracic or lumbosacral neuritis or radiculitis, unspecified [id number]    Spasm of muscle M62.838    Pain in limb M79.609    Disturbance of skin sensation R20.9    Diabetes mellitus ([address] E11.9    HTN (hypertension) I10    Hypercholesteremia E78.00    GERD (gastroesophageal reflux disease) K21.9    CRI (chronic renal insufficiency) N18.9    Dementia F03.90    Gallstones K80.20    Bunion of great toe of right foot M21.611    DDD (degenerative disc disease), lumbar M51.36    Lumbar arthropathy M46.96    Spondylolisthesis of lumbar region M43.16    Lumbar post-laminectomy syndrome M96.1    S/P lumbar spinal fusion Z98.1    Lumbar nerve root impingement M54.16    Sacroiliitis (HCC) M46.1    Polyarthralgia M25.50    DJD (degenerative joint disease), multiple sites M15.9    Subscapularis tendinitis of right shoulder M75.81    Chondromalacia of right shoulder M94.211    Shoulder pain M25.519    Chronic low back pain M54.5, G89.29       Depression Risk Factor Screening:     PHQ 2 / 9, over the last two weeks 3/20/2017   Little interest or pleasure in doing things Not at all   Feeling down, depressed or hopeless Not at all   Total Score PHQ 2 0     Alcohol Risk Factor Screening:    On any occasion during the past 3 months, have you had more than 3 drinks containing alcohol? Not applicable    Do you average more than 7 drinks per week? Not applicable      Functional Ability and Level of Safety:     Hearing Loss   mild, subjective    Activities of Daily Living   Partial assistance. Requires assistance with: ambulation and bathing and hygiene    Fall Risk     Fall Risk Assessment, last 12 mths 3/20/2017   Able to walk? Yes   Fall in past 12 months? No     Abuse Screen   Patient is not abused    Review of Systems   A comprehensive review of systems was negative except for that written in the HPI. Physical Examination     Evaluation of Cognitive Function:  Mood/affect:  happy  Appearance: age appropriate  Family member/caregiver input: self and son    No exam performed today, nor indicated. Patient Care Team:  Lou Torrez MD as PCP - General (Internal Medicine)  Messi Mayorga MD (Physical Medicine and Rehab)  Cody Dominguez MD as Consulting Provider (Ophthalmology)    Advice/Referrals/Counseling   Education and counseling provided:  Are appropriate based on today's review and evaluation      Assessment/Plan       ICD-10-CM ICD-9-CM    1. Routine general medical examination at a health care facility Z00.00 V70.0    2.  Screening for alcoholism Z13.89 V79.1

## 2017-04-17 ENCOUNTER — OFFICE VISIT (OUTPATIENT)
Dept: PAIN MANAGEMENT | Age: 82
End: 2017-04-17

## 2017-04-17 VITALS — HEART RATE: 81 BPM | DIASTOLIC BLOOD PRESSURE: 48 MMHG | RESPIRATION RATE: 17 BRPM | SYSTOLIC BLOOD PRESSURE: 135 MMHG

## 2017-04-17 DIAGNOSIS — Z79.899 ENCOUNTER FOR LONG-TERM (CURRENT) USE OF MEDICATIONS: ICD-10-CM

## 2017-04-17 DIAGNOSIS — M43.16 SPONDYLOLISTHESIS OF LUMBAR REGION: ICD-10-CM

## 2017-04-17 DIAGNOSIS — M96.1 LUMBAR POST-LAMINECTOMY SYNDROME: ICD-10-CM

## 2017-04-17 DIAGNOSIS — M96.1 POSTLAMINECTOMY SYNDROME, LUMBAR REGION: ICD-10-CM

## 2017-04-17 DIAGNOSIS — M54.5 CHRONIC BILATERAL LOW BACK PAIN, WITH SCIATICA PRESENCE UNSPECIFIED: Primary | ICD-10-CM

## 2017-04-17 DIAGNOSIS — M25.50 POLYARTHRALGIA: ICD-10-CM

## 2017-04-17 DIAGNOSIS — M51.37 DEGENERATION OF LUMBAR OR LUMBOSACRAL INTERVERTEBRAL DISC: ICD-10-CM

## 2017-04-17 DIAGNOSIS — M47.817 LUMBOSACRAL SPONDYLOSIS WITHOUT MYELOPATHY: ICD-10-CM

## 2017-04-17 DIAGNOSIS — M51.36 DDD (DEGENERATIVE DISC DISEASE), LUMBAR: ICD-10-CM

## 2017-04-17 DIAGNOSIS — G89.29 CHRONIC BILATERAL LOW BACK PAIN, WITH SCIATICA PRESENCE UNSPECIFIED: Primary | ICD-10-CM

## 2017-04-17 RX ORDER — MORPHINE SULFATE 30 MG/1
15-30 TABLET ORAL
Qty: 90 TAB | Refills: 0 | Status: SHIPPED | OUTPATIENT
Start: 2017-04-17 | End: 2017-06-14 | Stop reason: SDUPTHER

## 2017-04-17 RX ORDER — FENTANYL 50 UG/1
1 PATCH TRANSDERMAL
Qty: 10 PATCH | Refills: 0 | Status: SHIPPED | OUTPATIENT
Start: 2017-04-17 | End: 2017-06-14 | Stop reason: SDUPTHER

## 2017-04-17 RX ORDER — MORPHINE SULFATE 30 MG/1
15-30 TABLET ORAL
Qty: 90 TAB | Refills: 0 | Status: SHIPPED | OUTPATIENT
Start: 2017-05-16 | End: 2017-06-14 | Stop reason: SDUPTHER

## 2017-04-17 RX ORDER — FENTANYL 50 UG/1
1 PATCH TRANSDERMAL
Qty: 10 PATCH | Refills: 0 | Status: SHIPPED | OUTPATIENT
Start: 2017-05-16 | End: 2017-05-11 | Stop reason: SDUPTHER

## 2017-04-17 RX ORDER — NALOXONE HYDROCHLORIDE 4 MG/.1ML
4 SPRAY NASAL AS NEEDED
Qty: 1 BOTTLE | Refills: 1 | Status: SHIPPED | OUTPATIENT
Start: 2017-04-17 | End: 2019-01-21 | Stop reason: SDUPTHER

## 2017-04-17 NOTE — MR AVS SNAPSHOT
Visit Information Date & Time Provider Department Dept. Phone Encounter #  
 4/17/2017 10:45 AM Simon Kaba MD [facility] [phone] Follow-up Instructions Return in about 2 months (around 6/17/2017). Follow-up and Disposition History Your Appointments 9/18/2017 11:15 AM  
Office Visit with Mag Wahl ERN ([facility]) Appt Note: Return in about 6 months (around 9/20/2017) for HTN, DM, cholesterol [address]  
  
   
 [address] [facility] Upcoming Health Maintenance Date Due ZOSTER VACCINE AGE 60> 1/26/1986 EYE EXAM RETINAL OR DILATED Q1 1/13/2017 HEMOGLOBIN A1C Q6M 9/20/2017 MICROALBUMIN Q1 10/19/2017 GLAUCOMA SCREENING Q2Y 1/13/2018 LIPID PANEL Q1 3/7/2018 MEDICARE YEARLY EXAM 3/21/2018 DTaP/Tdap/Td series (2 - Td) 7/5/2022 Allergies as of 4/17/2017  Review Complete On: 4/17/2017 By: Simon Kaba MD  
  
 Severity Noted Reaction Type Reactions Aspirin    Nausea Only Current Immunizations  Reviewed on 10/6/2015 Name Date Influenza High Dose Vaccine PF 10/19/2016, 10/6/2015 12:25 PM  
 Influenza Vaccine 9/25/2014 Influenza Vaccine Split 10/25/2012  1:24 PM  
 Pneumococcal Conjugate (PCV-13) 10/19/2016  3:50 PM  
 Pneumococcal Vaccine (Unspecified Type) 11/18/2009 TDAP Vaccine 7/5/2012 Not reviewed this visit You Were Diagnosed With   
  
 Codes Comments Chronic bilateral low back pain, with sciatica presence unspecified    -  Primary ICD-10-CM: M54.5, G89.29 ICD-9-CM: 724.2, 338.29 Encounter for long-term (current) use of medications     ICD-10-CM: Z79.899 ICD-9-CM: V58.69 Postlaminectomy syndrome, lumbar region     ICD-10-CM: M96.1 ICD-9-CM: 722.83   
 Degeneration of lumbar or lumbosacral intervertebral disc     ICD-10-CM: M51.37 
ICD-9-CM: 722.52 Lumbosacral spondylosis without myelopathy     ICD-10-CM: W11.999 ICD-9-CM: 721.3 DDD (degenerative disc disease), lumbar     ICD-10-CM: M51.36 
ICD-9-CM: 722.52 Spondylolisthesis of lumbar region     ICD-10-CM: M43.16 
ICD-9-CM: 738.4 Lumbar post-laminectomy syndrome     ICD-10-CM: M96.1 ICD-9-CM: 722.83 Polyarthralgia     ICD-10-CM: M25.50 ICD-9-CM: 719.49 Vitals BP Pulse Resp OB Status Smoking Status 135/48 81 17 Postmenopausal Never Smoker Vitals History Preferred Pharmacy Pharmacy Name Phone [facility] [address] [phone] Your Updated Medication List  
  
   
This list is accurate as of: 4/17/17 11:57 AM.  Always use your most recent med list. amLODIPine 10 mg tablet Commonly known as:  Neo Wagnerdle Take 1 Tab by mouth daily. Blood-Glucose Meter monitoring kit Commonly known as:  Alicia Elizabeth Use to test blood sugar daily or as directed  
  
 cromolyn 4 % ophthalmic solution Commonly known as:  OPTICROM Administer 1 Drop to both eyes. diclofenac 3 % topical gel Commonly known as:  Yair Hitchcock Apply  to affected area two (2) times daily as needed for Pain.  
  
 ergocalciferol 50,000 unit capsule Commonly known as:  VITAMIN D2 Take 1 Cap by mouth every seven (7) days. * fentaNYL 50 mcg/hr PATCH Commonly known as:  DURAGESIC  
1 Patch by TransDERmal route every seventy-two (72) hours. Max Daily Amount: 1 Patch. For chronic pain * fentaNYL 50 mcg/hr PATCH Commonly known as:  DURAGESIC  
1 Patch by TransDERmal route every seventy-two (72) hours for 30 days. Max Daily Amount: 1 Patch. For chronic pain Start taking on:  5/16/2017  
  
 furosemide 40 mg tablet Commonly known as:  LASIX Take 1 Tab by mouth daily. HYDROcodone-acetaminophen 5-325 mg per tablet Commonly known as:  Aguiar Fryer Take 1 Tab by Mouth Every 4 Hours As Needed for Pain. Lancets Misc Commonly known as:  ONETOUCH ULTRASOFT LANCETS Use to test blood sugar daily or as directed  
  
 lidocaine 5 % ointment Commonly known as:  XYLOCAINE  
USE DAILY AS NEEDED FOR DRESSING CHANGES * morphine IR 30 mg tablet Commonly known as:  MS IR Take 0.5-1 Tabs by mouth three (3) times daily as needed for Pain (rescue) for up to 30 days. Max Daily Amount: 90 mg.  
  
 * morphine IR 30 mg tablet Commonly known as:  MS IR Take 0.5-1 Tabs by mouth three (3) times daily as needed for Pain (rescue) for up to 30 days. Max Daily Amount: 90 mg. Start taking on:  5/16/2017  
  
 mupirocin calcium 2 % topical cream  
Commonly known as:  [employer] Apply  to affected area two (2) times a day.  
  
 naloxone 4 mg/actuation Spry 4 mg by Nasal route as needed. omeprazole 40 mg capsule Commonly known as:  PRILOSEC Take 1 Cap by mouth daily. ONETOUCH ULTRA TEST strip Generic drug:  glucose blood VI test strips TEST BLOOD SUGAR DAILY OR AS DIRECTED  
  
 potassium chloride 20 mEq tablet Commonly known as:  K-DUR, KLOR-CON  
TAKE 1 TABLET BY MOUTH TWICE DAILY  
  
 rivastigmine 4.6 mg/24 hr patch Commonly known as:  EXELON  
1 Patch by TransDERmal route daily. SITagliptin 50 mg tablet Commonly known as:  Jennifer Jaswinder TAKE 1 TABLET BY MOUTH EVERY DAY  
  
 tolterodine ER 4 mg ER capsule Commonly known as:  Sharon Apo Take 1 Cap by mouth daily. ursodiol 300 mg capsule Commonly known as:  ACTIGALL TAKE 1 CAPSULE BY MOUTH TWICE DAILY  
  
 valsartan-hydroCHLOROthiazide 320-25 mg per tablet Commonly known as:  DIOVAN-HCT  
TAKE 1 TABLET BY MOUTH DAILY * Notice:   This list has 4 medication(s) that are the same as other medications prescribed for you. Read the directions carefully, and ask your doctor or other care provider to review them with you. Prescriptions Printed Refills  
 fentaNYL (DURAGESIC) 50 mcg/hr PATCH 0 Si Patch by TransDERmal route every seventy-two (72) hours. Max Daily Amount: 1 Patch. For chronic pain  
 Class: Print Route: TransDERmal  
 morphine IR (MS IR) 30 mg tablet 0 Sig: Take 0.5-1 Tabs by mouth three (3) times daily as needed for Pain (rescue) for up to 30 days. Max Daily Amount: 90 mg.  
 Class: Print Route: Oral  
 fentaNYL (DURAGESIC) 50 mcg/hr PATCH 0 Starting on: 2017 Si Patch by TransDERmal route every seventy-two (72) hours for 30 days. Max Daily Amount: 1 Patch. For chronic pain  
 Class: Print Route: TransDERmal  
 morphine IR (MS IR) 30 mg tablet 0 Starting on: 2017 Sig: Take 0.5-1 Tabs by mouth three (3) times daily as needed for Pain (rescue) for up to 30 days. Max Daily Amount: 90 mg.  
 Class: Print Route: Oral  
 naloxone 4 mg/actuation spry 1 Si mg by Nasal route as needed. Class: Print Route: Nasal  
  
We Performed the Following AMB POC DRUG SCREEN () [ [facility]] DRUG SCREEN [[id number] Custom] Follow-up Instructions Return in about 2 months (around 2017). Introducing [facility]! Kalpesh Orlando introduces [employer] patient portal. Now you can access parts of your medical record, email your doctor's office, and request medication refills online. 1. In your internet browser, go to [email or web address] 2. Click on the First Time User? Click Here link in the Sign In box. You will see the New Member Sign Up page. 3. Enter your [employer] Access Code exactly as it appears below. You will not need to use this code after youve completed the sign-up process. If you do not sign up before the expiration date, you must request a new code. · [employer] Access Code: [email or web address] Expires: 5/22/2017  2:53 PM 
 
4. Enter the last four digits of your Social Security Number (xxxx) and Date of Birth (mm/dd/yyyy) as indicated and click Submit. You will be taken to the next sign-up page. 5. Create a [employer] ID. This will be your [employer] login ID and cannot be changed, so think of one that is secure and easy to remember. 6. Create a [employer] password. You can change your password at any time. 7. Enter your Password Reset Question and Answer. This can be used at a later time if you forget your password. 8. Enter your e-mail address. You will receive e-mail notification when new information is available in [address] 9. Click Sign Up. You can now view and download portions of your medical record. 10. Click the Download Summary menu link to download a portable copy of your medical information. If you have questions, please visit the Frequently Asked Questions section of the [employer] website. Remember, [employer] is NOT to be used for urgent needs. For medical emergencies, dial 911. Now available from your iPhone and Android! Please provide this summary of care documentation to your next provider. Your primary care clinician is listed as [facility]. If you have any questions after today's visit, please call [phone].

## 2017-04-17 NOTE — PROGRESS NOTES
Nursing Notes    Patient presents to the office today in follow-up. Reviewed medications with counts as follows:    Rx Date filled Qty Dispensed Pill Count Last Dose Short   Fentanyl 50 mcg 3/26/17 10 4 1 on no   Morphine ir 30 mg 3/27/17 90 30 This am no   Ms. Kemp has a reminder for a \"due or due soon\" health maintenance. I have asked that she contact her primary care provider for follow-up on this health maintenance. Comments: patient returned fentanyl 50 mcg prescription with start date of 4/14/17 to be destroyed    POC UDS was performed in office today    Any new labs or imaging since last appointment? NO    Have you been to an emergency room (ER) or urgent care clinic since your last visit? NO            Have you been hospitalized since your last visit? NO     If yes, where, when, and reason for visit? Have you seen or consulted any other health care providers outside of the [address] [place] [address]  since your last visit?   YES     If yes, where, when, and reason for visit?   pcp

## 2017-04-17 NOTE — PROGRESS NOTES
HISTORY OF PRESENT ILLNESS  Ingris Simon is a 80 y.o. female. HPI Comments: Meds help with pain control and quality of life. No new side effects reported today. Visit survey reviewed and will be scanned.  reviewed. Recent average level of pain(out of 10)-5  Chief complaint low back pain, pain to different joints  Chronic pain  She is here today with her son  Using immediate release morphine 30 mg 3 times a day as needed  Fentanyl patch 50 mcg every 3 days  Medication helps improve general activity, walking, sleep, enjoyment of life                  Review of Systems   Constitutional: Negative for chills and fever. HENT: Negative for ear discharge and ear pain. Eyes: Negative for pain and discharge. Respiratory: Negative for hemoptysis and shortness of breath. Cardiovascular: Negative for chest pain and leg swelling. Gastrointestinal: Positive for constipation. Musculoskeletal: Positive for back pain and myalgias. Skin: Negative for itching and rash. Neurological: Negative for dizziness. Psychiatric/Behavioral: Negative for hallucinations and suicidal ideas. All other systems reviewed and are negative. Physical Exam   Constitutional: She appears well-developed and well-nourished. She is cooperative. She does not have a sickly appearance. HENT:   Head: Normocephalic and atraumatic. Right Ear: External ear normal. No drainage. Left Ear: External ear normal. No drainage. Nose: Nose normal.   Eyes: Lids are normal. Right eye exhibits no discharge. Left eye exhibits no discharge. Right conjunctiva has no hemorrhage. Left conjunctiva has no hemorrhage. Neck: Neck supple. No tracheal deviation present. No thyroid mass present. Pulmonary/Chest: Effort normal. No respiratory distress. Neurological: She is alert. No cranial nerve deficit. Gait with RW   Skin: Skin is intact. No rash noted. She is not diaphoretic.    Psychiatric: Her speech is normal and behavior is normal. Her mood appears anxious. Her affect is not angry. She does not express inappropriate judgment. She does not exhibit a depressed mood. Nursing note and vitals reviewed. ASSESSMENT and PLAN  Encounter Diagnoses   Name Primary?  Chronic bilateral low back pain, with sciatica presence unspecified Yes    Encounter for long-term (current) use of medications     Postlaminectomy syndrome, lumbar region     Degeneration of lumbar or lumbosacral intervertebral disc     Lumbosacral spondylosis without myelopathy     DDD (degenerative disc disease), lumbar     Spondylolisthesis of lumbar region     Lumbar post-laminectomy syndrome     Polyarthralgia    --Urine test or oral swab today. Also, the prescription monitoring program was reviewed today. The majority of today's visit was spent counseling and coordinating care. Total visit time-40 minutes. -Dragon medical dictation software was used for portions of this report. Unintended errors may occur. No indicators for medication misuse.  reviewed. Pain Meds and Quality Of Life have been reviewed. Nonpharmacologic therapy and non-opioid pharmacologic therapy were considered. If opioid therapy is prescribed, this is only if the expected benefits are anticipated to outweigh risks. Possible changes to treatment plan considered. Support/education given as needed. Today-medications are as listed. No significant changes to medications. Follow up -- 2 months. Because the patient's current regimen places him/her at increased risk for possible overdose, a prescription for naloxone is being provided. The patient understands that this medication is only to be used in the setting of a possible overdose and that inadvertent use of this medication could precipitate overt withdrawal.  I discussed naloxone with the patient today. In addition, the patient has received the naloxone instruction sheet.

## 2017-05-02 ENCOUNTER — PATIENT OUTREACH (OUTPATIENT)
Dept: INTERNAL MEDICINE CLINIC | Age: 82
End: 2017-05-02

## 2017-05-02 ENCOUNTER — TELEPHONE (OUTPATIENT)
Dept: INTERNAL MEDICINE CLINIC | Age: 82
End: 2017-05-02

## 2017-05-02 NOTE — PROGRESS NOTES
Transitional Care Nurse Navigator Note:  Hospital Follow Up for Admission from [facility] 4/26/17 - 5/1/17    Pt admitted to:  RRAT score: [facility]/high risk    Per EMR due to:   Discharge Diagnosis:  Active Hospital Problems  Pneumonia susp g neg bact  Ac on ch diastolic chf  Hypoxic ac resp failure  Pedal edema  Chronic pain  Lower leg edema  Dry cough  Chronic pain due to trauma  Essential hypertension    Discharge Plan:  Cardiac Diet and Diabetic [address] [facility]    Follow up with Viktor Tovar MD within 7 days. appt is scheduled for 5/11/17 with Dr Thaddeus Iraheta.      NN outgoing call to patient. Not able to reach pt or leave message. Phone busy.

## 2017-05-02 NOTE — TELEPHONE ENCOUNTER
Incoming call from Nurse Wilmar Fiddler calling to inform our office that pt was discharged from the hospital and that she was treated for pneumonia and that she just wanted to make sure that pt daughter had called to schedule a hospital f/u appt stated understanding and confirmed that pt daughter did schedule she stated understanding no other questions or concerns noted at this time.     She also stated that hospital sent pt home with PT and OT    JAC

## 2017-05-03 ENCOUNTER — PATIENT OUTREACH (OUTPATIENT)
Dept: INTERNAL MEDICINE CLINIC | Age: 82
End: 2017-05-03

## 2017-05-03 NOTE — PROGRESS NOTES
Transitional Care Nurse Navigator Note:  Hospital Follow Up for Admission from  [address] [place] [address] 4/26/17 - 5/1/17    Pt was admitted to medicine service. RRAT score: [facility]/high risk for readmission    Per EMR due to:   Patient treated for Heart Failure and Pneumonia      Discharge Plan  Cardiac Diet and Diabetic [address]  Follow up with Arlyn Chamberlain MD within 7 days.     NN outgoing call to patient/care taker, spoke to daughter/Alba Gentile . Pt ID verified. She states pt is doing good since being home. Pt is being followed by [facility]. A visited yesterday, pt vital signs were good. They are taking care of her biliary drain tube. Pt lives with daughter Ismael Daily, she is the primary care taker and brother takes pt to appts. Pt has appt in June with Dr Lynn Bhatia. Pt is eating and voiding okay. Medications were reviewed. Pt gets morphine and fentanyl patch as directed. She gets very little, if she is not having a lot of pain. Dr Oleg Rush manages her pain med. Pt is schedule for follow up with Dr Kenton Moore 5/11/17 at 1500. Caretaker encouraged to call the office for questions or concerns. She verbalized understanding and thanked us for the call.

## 2017-05-11 ENCOUNTER — OFFICE VISIT (OUTPATIENT)
Dept: INTERNAL MEDICINE CLINIC | Age: 82
End: 2017-05-11

## 2017-05-11 VITALS
OXYGEN SATURATION: 98 % | HEIGHT: 66 IN | SYSTOLIC BLOOD PRESSURE: 118 MMHG | TEMPERATURE: 96.6 F | BODY MASS INDEX: 25.07 KG/M2 | HEART RATE: 95 BPM | WEIGHT: 156 LBS | DIASTOLIC BLOOD PRESSURE: 65 MMHG | RESPIRATION RATE: 14 BRPM

## 2017-05-11 DIAGNOSIS — T79.0XXS: ICD-10-CM

## 2017-05-11 DIAGNOSIS — Z09 HOSPITAL DISCHARGE FOLLOW-UP: Primary | ICD-10-CM

## 2017-05-11 DIAGNOSIS — R41.0 CONFUSION: ICD-10-CM

## 2017-05-11 NOTE — MR AVS SNAPSHOT
Visit Information Date & Time Provider Department Dept. Phone Encounter #  
 5/11/2017  3:00 PM Mei Sheppard, [address] [facility] [phone] [id number] Follow-up Instructions Return if symptoms worsen or fail to improve, for as appointed. Your Appointments 6/14/2017 11:00 AM  
Follow Up with Chas Bryant MD  
[facility] [address] [place] [address]) Appt Note: return in 2 months [address] [facility] [address]  
[phone]  [id number] 9/18/2017 11:15 AM  
Office Visit with Mei Sheppard, [facility] ([address]) Appt Note: Return in about 6 months (around 9/20/2017) for HTN, DM, cholesterol [address]  
  
   
 [address] [facility] Upcoming Health Maintenance Date Due  
 EYE EXAM RETINAL OR DILATED Q1 1/13/2017 ZOSTER VACCINE AGE 60> 4/11/2019* INFLUENZA AGE 9 TO ADULT 8/1/2017 HEMOGLOBIN A1C Q6M 9/20/2017 MICROALBUMIN Q1 10/19/2017 GLAUCOMA SCREENING Q2Y 1/13/2018 LIPID PANEL Q1 3/7/2018 MEDICARE YEARLY EXAM 3/21/2018 DTaP/Tdap/Td series (2 - Td) 7/5/2022 *Topic was postponed. The date shown is not the original due date. Allergies as of 5/11/2017  Review Complete On: 5/11/2017 By: Mei Sheppard MD  
  
 Severity Noted Reaction Type Reactions Aspirin    Nausea Only Current Immunizations  Reviewed on 10/6/2015 Name Date Influenza High Dose Vaccine PF 10/19/2016, 10/6/2015 12:25 PM  
 Influenza Vaccine 9/25/2014 Influenza Vaccine Split 10/25/2012  1:24 PM  
 Pneumococcal Conjugate (PCV-13) 10/19/2016  3:50 PM  
 Pneumococcal Vaccine (Unspecified Type) 11/18/2009 TDAP Vaccine 7/5/2012 Not reviewed this visit You Were Diagnosed With   
  
 Codes Comments Hospital discharge follow-up    -  Primary ICD-10-CM: [facility] ICD-9-CM: V67.59 Pneumathemia, sequela     ICD-10-CM: T79. 2IWP ICD-9-CM: 973. 6 Confusion     ICD-10-CM: R41.0 ICD-9-CM: 298.9 Vitals BP Pulse Temp Resp Height(growth percentile) Weight(growth percentile)  
 118/65 95 96.6 °F (35.9 °C) 14 5' 6\" (1.676 m) 156 lb (70.8 kg) SpO2 BMI OB Status Smoking Status 98% 25.18 kg/m2 Postmenopausal Never Smoker BMI and BSA Data Body Mass Index Body Surface Area  
 25.18 kg/m 2 1.82 m 2 Preferred Pharmacy Pharmacy Name Phone [facility] [address] [facility] [address], [address] [phone] Your Updated Medication List  
  
   
This list is accurate as of: 5/11/17  3:50 PM.  Always use your most recent med list. amLODIPine 10 mg tablet Commonly known as:  Herjadene Pupa Take 1 Tab by mouth daily. Blood-Glucose Meter monitoring kit Commonly known as:  Kenneth Rome Use to test blood sugar daily or as directed  
  
 cromolyn 4 % ophthalmic solution Commonly known as:  OPTICROM Administer 1 Drop to both eyes. diclofenac 3 % topical gel Commonly known as:  Marlou Mu Apply  to affected area two (2) times daily as needed for Pain.  
  
 ergocalciferol 50,000 unit capsule Commonly known as:  VITAMIN D2 Take 1 Cap by mouth every seven (7) days. fentaNYL 50 mcg/hr PATCH Commonly known as:  DURAGESIC  
1 Patch by TransDERmal route every seventy-two (72) hours. Max Daily Amount: 1 Patch. For chronic pain  
  
 furosemide 40 mg tablet Commonly known as:  LASIX Take 1 Tab by mouth daily. HYDROcodone-acetaminophen 5-325 mg per tablet Commonly known as:  Rozann Bony Take 1 Tab by Mouth Every 4 Hours As Needed for Pain. Lancets Misc Commonly known as:  ONETOUCH ULTRASOFT LANCETS Use to test blood sugar daily or as directed  
  
 lidocaine 5 % ointment Commonly known as:  XYLOCAINE  
USE DAILY AS NEEDED FOR DRESSING CHANGES * morphine IR 30 mg tablet Commonly known as:  MS IR Take 0.5-1 Tabs by mouth three (3) times daily as needed for Pain (rescue) for up to 30 days. Max Daily Amount: 90 mg.  
  
 * morphine IR 30 mg tablet Commonly known as:  MS IR Take 0.5-1 Tabs by mouth three (3) times daily as needed for Pain (rescue) for up to 30 days. Max Daily Amount: 90 mg. Start taking on:  5/16/2017  
  
 mupirocin calcium 2 % topical cream  
Commonly known as:  [employer] Apply  to affected area two (2) times a day.  
  
 naloxone 4 mg/actuation Spry 4 mg by Nasal route as needed. omeprazole 40 mg capsule Commonly known as:  PRILOSEC Take 1 Cap by mouth daily. ONETOUCH ULTRA TEST strip Generic drug:  glucose blood VI test strips TEST BLOOD SUGAR DAILY OR AS DIRECTED  
  
 potassium chloride 20 mEq tablet Commonly known as:  K-DUR, KLOR-CON  
TAKE 1 TABLET BY MOUTH TWICE DAILY  
  
 rivastigmine 4.6 mg/24 hr patch Commonly known as:  EXELON  
1 Patch by TransDERmal route daily. SITagliptin 50 mg tablet Commonly known as:  Yoselin Layman TAKE 1 TABLET BY MOUTH EVERY DAY  
  
 tolterodine ER 4 mg ER capsule Commonly known as:  Nu Dries Take 1 Cap by mouth daily. ursodiol 300 mg capsule Commonly known as:  ACTIGALL TAKE 1 CAPSULE BY MOUTH TWICE DAILY  
  
 valsartan-hydroCHLOROthiazide 320-25 mg per tablet Commonly known as:  DIOVAN-HCT  
TAKE 1 TABLET BY MOUTH DAILY * Notice: This list has 2 medication(s) that are the same as other medications prescribed for you. Read the directions carefully, and ask your doctor or other care provider to review them with you. Follow-up Instructions Return if symptoms worsen or fail to improve, for as appointed. Introducing [facility]!    
 Kaleb Sutton introduces [employer] patient portal. Now you can access parts of your medical record, email your doctor's office, and request medication refills online. 1. In your internet browser, go to [email or web address] 2. Click on the First Time User? Click Here link in the Sign In box. You will see the New Member Sign Up page. 3. Enter your MD Revolution Access Code exactly as it appears below. You will not need to use this code after youve completed the sign-up process. If you do not sign up before the expiration date, you must request a new code. · [employer] Access Code: [email or web address] Expires: 5/22/2017  2:53 PM 
 
4. Enter the last four digits of your Social Security Number (xxxx) and Date of Birth (mm/dd/yyyy) as indicated and click Submit. You will be taken to the next sign-up page. 5. Create a MD Revolution ID. This will be your MD Revolution login ID and cannot be changed, so think of one that is secure and easy to remember. 6. Create a [employer] password. You can change your password at any time. 7. Enter your Password Reset Question and Answer. This can be used at a later time if you forget your password. 8. Enter your e-mail address. You will receive e-mail notification when new information is available in [address] 9. Click Sign Up. You can now view and download portions of your medical record. 10. Click the Download Summary menu link to download a portable copy of your medical information. If you have questions, please visit the Frequently Asked Questions section of the MD [employer] website. Remember, [employer] is NOT to be used for urgent needs. For medical emergencies, dial 911. Now available from your iPhone and Android! Please provide this summary of care documentation to your next provider. Your primary care clinician is listed as [facility]. If you have any questions after today's visit, please call [phone].

## 2017-05-11 NOTE — PROGRESS NOTES
Chief Complaint   Patient presents with   [facility][1] Follow Up     disorintation and pneumonia       Pt preferred language for health care discussion is english. Is someone accompanying this pt? son    Is the patient using any DME equipment during [address]? rollator    Depression Screening completed. yes    Learning Assessment completed. yes    Abuse Screening completed. yes    Health Maintenance reviewed and discussed per provider. Yes    Pt is due for Diabetic eye exam, Zostavax. Please order/place referral if appropriate. Advance Directive:  1. Do you have an advance directive in place? Patient Reply:no    2. If not, would you like material regarding how to put one in place? Patient Reply: No    Coordination of Care:  1. Have you been to the ER, urgent care clinic since your last visit? Hospitalized since your last visit? [address] [place] [address]    2. Have you seen or consulted any other health care providers outside of the [address] [place] [address] since your last visit? Include any pap smears or colon screening.  no

## 2017-05-11 NOTE — PROGRESS NOTES
HISTORY OF PRESENT ILLNESS  Shimon Ibarra is a 80 y.o. female. .,\  Visit Vitals    /65    Pulse 95    Temp 96.6 °F (35.9 °C)    Resp 14    Ht 5' 6\" (1.676 m)    Wt 156 lb (70.8 kg)    SpO2 98%    BMI 25.18 kg/m2       HPI Comments: Pt here to f/u on recent hospitalization. She has been disoriented in the morning and seemed out of it. Discharged to home on 5/1/17  Her biliary tube was compromised. But she had bene congested and they thought she might also have had some pneumonia  Her tube was changed and she is now off antibiotics    She has home PT and nursing coming out. Appetite is back to normal. Congestion is better. She is still a little fatigued        Hospital Follow Up   The history is provided by the patient (see comments). This is a new problem. Pertinent negatives include no chest pain and no shortness of breath. Review of Systems   Constitutional: Negative. Respiratory: Negative for cough and shortness of breath. Cardiovascular: Negative for chest pain and palpitations. Psychiatric/Behavioral:        Baseline dementia         Physical Exam   Constitutional: She appears well-developed and well-nourished. No distress. Cardiovascular: Normal rate and regular rhythm. Pulmonary/Chest: Effort normal and breath sounds normal.   Neurological: She is alert. Skin: Skin is warm and dry. She is not diaphoretic. Psychiatric: She has a normal mood and affect. Nursing note and vitals reviewed. ASSESSMENT and PLAN    ICD-10-CM ICD-9-CM    1. Hospital discharge follow-up Z09 V67.59    2. Pneumathemia, sequela T79. 0XXS 908.6    3. Confusion R41.0 298.9        appears nearly back to baseline    Doing well over all. Chest clear. Abdomen is soft. Appetite nearly normal. Walking though a little slowly.   Available hospital record reviewed    Will continue same    Unless there is a change,  keep Sept appt

## 2017-05-13 ENCOUNTER — TELEPHONE (OUTPATIENT)
Dept: INTERNAL MEDICINE CLINIC | Age: 82
End: 2017-05-13

## 2017-05-15 NOTE — TELEPHONE ENCOUNTER
Spoke with pharmacy, confirmed pt name and . Discussed requested refill. Pharmacist stated that she was unsure as to why the refill request was placed as she clearly sees 11 refills. Understanding verbalized from both parties.      Be advised

## 2017-05-16 RX ORDER — AMLODIPINE BESYLATE 10 MG/1
TABLET ORAL
Qty: 30 TAB | Refills: 0 | Status: SHIPPED | OUTPATIENT
Start: 2017-05-16 | End: 2017-06-15 | Stop reason: SDUPTHER

## 2017-05-16 RX ORDER — RIVASTIGMINE 4.6 MG/24H
PATCH, EXTENDED RELEASE TRANSDERMAL
Qty: 30 PATCH | Refills: 0 | Status: SHIPPED | OUTPATIENT
Start: 2017-05-16 | End: 2018-04-16 | Stop reason: SDUPTHER

## 2017-06-14 ENCOUNTER — OFFICE VISIT (OUTPATIENT)
Dept: PAIN MANAGEMENT | Age: 82
End: 2017-06-14

## 2017-06-14 VITALS — BODY MASS INDEX: 25.18 KG/M2 | WEIGHT: 156 LBS

## 2017-06-14 DIAGNOSIS — M51.36 DDD (DEGENERATIVE DISC DISEASE), LUMBAR: ICD-10-CM

## 2017-06-14 DIAGNOSIS — M47.816 LUMBAR ARTHROPATHY: ICD-10-CM

## 2017-06-14 DIAGNOSIS — M25.50 POLYARTHRALGIA: ICD-10-CM

## 2017-06-14 DIAGNOSIS — Z98.1 S/P LUMBAR SPINAL FUSION: ICD-10-CM

## 2017-06-14 DIAGNOSIS — G89.29 CHRONIC BILATERAL LOW BACK PAIN, WITH SCIATICA PRESENCE UNSPECIFIED: Primary | ICD-10-CM

## 2017-06-14 DIAGNOSIS — M51.37 DEGENERATION OF LUMBAR OR LUMBOSACRAL INTERVERTEBRAL DISC: ICD-10-CM

## 2017-06-14 DIAGNOSIS — M54.5 CHRONIC BILATERAL LOW BACK PAIN, WITH SCIATICA PRESENCE UNSPECIFIED: Primary | ICD-10-CM

## 2017-06-14 DIAGNOSIS — M96.1 POSTLAMINECTOMY SYNDROME, LUMBAR REGION: ICD-10-CM

## 2017-06-14 DIAGNOSIS — M96.1 LUMBAR POST-LAMINECTOMY SYNDROME: ICD-10-CM

## 2017-06-14 DIAGNOSIS — M43.16 SPONDYLOLISTHESIS OF LUMBAR REGION: ICD-10-CM

## 2017-06-14 DIAGNOSIS — M47.817 LUMBOSACRAL SPONDYLOSIS WITHOUT MYELOPATHY: ICD-10-CM

## 2017-06-14 RX ORDER — MORPHINE SULFATE 30 MG/1
15-30 TABLET ORAL
Qty: 90 TAB | Refills: 0 | Status: SHIPPED | OUTPATIENT
Start: 2017-06-14 | End: 2017-07-14

## 2017-06-14 RX ORDER — MORPHINE SULFATE 30 MG/1
15-30 TABLET ORAL
Qty: 90 TAB | Refills: 0 | Status: SHIPPED | OUTPATIENT
Start: 2017-07-13 | End: 2017-08-12

## 2017-06-14 RX ORDER — FENTANYL 50 UG/1
1 PATCH TRANSDERMAL
Qty: 10 PATCH | Refills: 0 | Status: SHIPPED | OUTPATIENT
Start: 2017-06-14 | End: 2017-10-04 | Stop reason: SDUPTHER

## 2017-06-14 RX ORDER — FENTANYL 50 UG/1
1 PATCH TRANSDERMAL
Qty: 10 PATCH | Refills: 0 | Status: SHIPPED | OUTPATIENT
Start: 2017-07-13 | End: 2017-08-12

## 2017-06-14 NOTE — PROGRESS NOTES
HISTORY OF PRESENT ILLNESS  Gisselle Vang is a 80 y.o. female. HPI Comments: Meds help with pain control and quality of life. No new side effects reported today. Visit survey reviewed and will be scanned.  reviewed. Recent average level of pain(out of 10)-5  Chief complaint low back pain  Pain to multiple joints  Chronic pain  Using immediate release morphine 30 mg 3 times a day as needed  Fentanyl patch 50 mcg every 3 day  Medication helps improve general activity, walking, sleep, enjoyment of life  She is accompanied by her son  She was in the hospital not too long ago. Recurrent gastrointestinal issues, I believe bile duct. She has been back home now and is back on her usual medication from our clinic. Measuring clinical outcomes of chronic pain patients. This was reviewed today. The survey will be scanned. Please see the survey for details. Total score-5      Review of Systems   Constitutional: Negative for chills and fever. HENT: Negative for ear discharge and ear pain. Eyes: Negative for pain and discharge. Respiratory: Negative for hemoptysis and shortness of breath. Cardiovascular: Negative for chest pain and leg swelling. Gastrointestinal: Positive for constipation. Musculoskeletal: Positive for back pain and myalgias. Skin: Negative for itching and rash. Neurological: Negative for dizziness. Psychiatric/Behavioral: Negative for hallucinations and suicidal ideas. All other systems reviewed and are negative. Physical Exam   Constitutional: She appears well-developed and well-nourished. She is cooperative. She does not have a sickly appearance. HENT:   Head: Normocephalic and atraumatic. Right Ear: External ear normal. No drainage. Left Ear: External ear normal. No drainage. Nose: Nose normal.   Eyes: Lids are normal. Right eye exhibits no discharge. Left eye exhibits no discharge. Right conjunctiva has no hemorrhage. Left conjunctiva has no hemorrhage. Neck: Neck supple. No tracheal deviation present. No thyroid mass present. Pulmonary/Chest: Effort normal. No respiratory distress. Neurological: She is alert. No cranial nerve deficit. Gait with RW   Skin: Skin is intact. No rash noted. She is not diaphoretic. Psychiatric: Her speech is normal and behavior is normal. Her mood appears anxious. Her affect is not angry. She does not express inappropriate judgment. She does not exhibit a depressed mood. Nursing note and vitals reviewed. ASSESSMENT and PLAN  Encounter Diagnoses   Name Primary?  Chronic bilateral low back pain, with sciatica presence unspecified Yes    Postlaminectomy syndrome, lumbar region     Degeneration of lumbar or lumbosacral intervertebral disc     Lumbosacral spondylosis without myelopathy     DDD (degenerative disc disease), lumbar     Lumbar arthropathy (HCC)     Spondylolisthesis of lumbar region     Lumbar post-laminectomy syndrome     S/P lumbar spinal fusion     Polyarthralgia    No indicators for recent medication misuse.  reviewed. Pain Meds and Quality Of Life have been reviewed. Nonpharmacologic therapy and non-opioid pharmacologic therapy were considered. If opioid therapy is prescribed, this is only if the expected benefits are anticipated to outweigh risks. Possible changes to treatment plan considered. Support/education given as needed. Today-medications are as listed. No significant changes to medications. Follow up -- 2 months.

## 2017-06-14 NOTE — MR AVS SNAPSHOT
Visit Information Date & Time Provider Department Dept. Phone Encounter #  
 6/14/2017 11:00 AM Luigi Judd MD [facility] [phone] Follow-up Instructions Return in about 2 months (around 8/14/2017). Follow-up and Disposition History Your Appointments 9/18/2017 11:15 AM  
Office Visit with Pete Elias, [address] ([address]) Appt Note: Return in about 6 months (around 9/20/2017) for HTN, DM, cholesterol [address]  
  
   
 [address] [facility] Upcoming Health Maintenance Date Due  
 EYE EXAM RETINAL OR DILATED Q1 1/13/2017 ZOSTER VACCINE AGE 60> 4/11/2019* INFLUENZA AGE 9 TO ADULT 8/1/2017 HEMOGLOBIN A1C Q6M 9/20/2017 MICROALBUMIN Q1 10/19/2017 GLAUCOMA SCREENING Q2Y 1/13/2018 LIPID PANEL Q1 3/7/2018 MEDICARE YEARLY EXAM 3/21/2018 DTaP/Tdap/Td series (2 - Td) 7/5/2022 *Topic was postponed. The date shown is not the original due date. Allergies as of 6/14/2017  Review Complete On: 6/14/2017 By: Luigi Judd MD  
  
 Severity Noted Reaction Type Reactions Aspirin    Nausea Only Current Immunizations  Reviewed on 10/6/2015 Name Date Influenza High Dose Vaccine PF 10/19/2016, 10/6/2015 12:25 PM  
 Influenza Vaccine 9/25/2014 Influenza Vaccine Split 10/25/2012  1:24 PM  
 Pneumococcal Conjugate (PCV-13) 10/19/2016  3:50 PM  
 Pneumococcal Vaccine (Unspecified Type) 11/18/2009 TDAP Vaccine 7/5/2012 Not reviewed this visit You Were Diagnosed With   
  
 Codes Comments Chronic bilateral low back pain, with sciatica presence unspecified    -  Primary ICD-10-CM: M54.5, G89.29 ICD-9-CM: 724.2, 338.29 Postlaminectomy syndrome, lumbar region     ICD-10-CM: M96.1 ICD-9-CM: 722.83   
 Degeneration of lumbar or lumbosacral intervertebral disc     ICD-10-CM: M51.37 
ICD-9-CM: 722.52 Lumbosacral spondylosis without myelopathy     ICD-10-CM: U26.117 ICD-9-CM: 721.3 DDD (degenerative disc disease), lumbar     ICD-10-CM: M51.36 
ICD-9-CM: 722.52 Lumbar arthropathy (HCC)     ICD-10-CM: M46.96 
ICD-9-CM: 721.3 Spondylolisthesis of lumbar region     ICD-10-CM: M43.16 
ICD-9-CM: 738.4 Lumbar post-laminectomy syndrome     ICD-10-CM: M96.1 ICD-9-CM: 722.83 S/P lumbar spinal fusion     ICD-10-CM: Z98.1 ICD-9-CM: V45.4 Polyarthralgia     ICD-10-CM: M25.50 ICD-9-CM: 719.49 Vitals Weight(growth percentile) BMI OB Status Smoking Status 156 lb (70.8 kg) 25.18 kg/m2 Postmenopausal Never Smoker BMI and BSA Data Body Mass Index Body Surface Area  
 25.18 kg/m 2 1.82 m 2 Preferred Pharmacy Pharmacy Name Phone [facility] [phone], [address] [phone] Your Updated Medication List  
  
   
This list is accurate as of: 6/14/17 12:22 PM.  Always use your most recent med list. amLODIPine 10 mg tablet Commonly known as:  Jade Bean TAKE 1 TABLET BY MOUTH DAILY Blood-Glucose Meter monitoring kit Commonly known as:  Veena Amaral Use to test blood sugar daily or as directed  
  
 cromolyn 4 % ophthalmic solution Commonly known as:  OPTICROM Administer 1 Drop to both eyes. diclofenac 3 % topical gel Commonly known as:  Edward Lerma Apply  to affected area two (2) times daily as needed for Pain.  
  
 ergocalciferol 50,000 unit capsule Commonly known as:  VITAMIN D2 Take 1 Cap by mouth every seven (7) days. * fentaNYL 50 mcg/hr PATCH Commonly known as:  DURAGESIC  
1 Patch by TransDERmal route every seventy-two (72) hours. Max Daily Amount: 1 Patch. For chronic pain  * fentaNYL 50 mcg/hr PATCH  
 Commonly known as:  DURAGESIC  
1 Patch by TransDERmal route every seventy-two (72) hours for 30 days. Max Daily Amount: 1 Patch. For chronic pain Start taking on:  7/13/2017  
  
 furosemide 40 mg tablet Commonly known as:  LASIX Take 1 Tab by mouth daily. HYDROcodone-acetaminophen 5-325 mg per tablet Commonly known as:  Mirian Oleary Take 1 Tab by Mouth Every 4 Hours As Needed for Pain. Lancets Misc Commonly known as:  ONETOUCH ULTRASOFT LANCETS Use to test blood sugar daily or as directed  
  
 lidocaine 5 % ointment Commonly known as:  XYLOCAINE  
USE DAILY AS NEEDED FOR DRESSING CHANGES * morphine IR 30 mg tablet Commonly known as:  MS IR Take 0.5-1 Tabs by mouth three (3) times daily as needed for Pain (rescue) for up to 30 days. Max Daily Amount: 90 mg.  
  
 * morphine IR 30 mg tablet Commonly known as:  MS IR Take 0.5-1 Tabs by mouth three (3) times daily as needed for Pain (rescue) for up to 30 days. Max Daily Amount: 90 mg. Start taking on:  7/13/2017  
  
 mupirocin calcium 2 % topical cream  
Commonly known as:  [employer] Apply  to affected area two (2) times a day.  
  
 naloxone 4 mg/actuation Spry 4 mg by Nasal route as needed. omeprazole 40 mg capsule Commonly known as:  PRILOSEC Take 1 Cap by mouth daily. ONETOUCH ULTRA TEST strip Generic drug:  glucose blood VI test strips TEST BLOOD SUGAR DAILY OR AS DIRECTED  
  
 potassium chloride 20 mEq tablet Commonly known as:  K-DUR, KLOR-CON  
TAKE 1 TABLET BY MOUTH TWICE DAILY  
  
 rivastigmine 4.6 mg/24 hr patch Commonly known as:  EXELON  
APPLY 1 PATCH EXTERNALLY TO THE SKIN DAILY SITagliptin 50 mg tablet Commonly known as:  Murriel Jodie TAKE 1 TABLET BY MOUTH EVERY DAY  
  
 tolterodine ER 4 mg ER capsule Commonly known as:  DETROL LA  
TAKE 1 CAPSULE BY MOUTH DAILY  
  
 ursodiol 300 mg capsule Commonly known as:  ACTIGALL TAKE 1 CAPSULE BY MOUTH TWICE DAILY valsartan-hydroCHLOROthiazide 320-25 mg per tablet Commonly known as:  DIOVAN-HCT  
TAKE 1 TABLET BY MOUTH DAILY * Notice: This list has 4 medication(s) that are the same as other medications prescribed for you. Read the directions carefully, and ask your doctor or other care provider to review them with you. Prescriptions Printed Refills  
 fentaNYL (DURAGESIC) 50 mcg/hr PATCH 0 Si Patch by TransDERmal route every seventy-two (72) hours. Max Daily Amount: 1 Patch. For chronic pain  
 Class: Print Route: TransDERmal  
 morphine IR (MS IR) 30 mg tablet 0 Sig: Take 0.5-1 Tabs by mouth three (3) times daily as needed for Pain (rescue) for up to 30 days. Max Daily Amount: 90 mg.  
 Class: Print Route: Oral  
 morphine IR (MS IR) 30 mg tablet 0 Starting on: 2017 Sig: Take 0.5-1 Tabs by mouth three (3) times daily as needed for Pain (rescue) for up to 30 days. Max Daily Amount: 90 mg.  
 Class: Print Route: Oral  
 fentaNYL (DURAGESIC) 50 mcg/hr PATCH 0 Starting on: 2017 Si Patch by TransDERmal route every seventy-two (72) hours for 30 days. Max Daily Amount: 1 Patch. For chronic pain  
 Class: Print Route: TransDERmal  
  
Follow-up Instructions Return in about 2 months (around 2017). Introducing [facility]! [employer] introduces [employer] patient portal. Now you can access parts of your medical record, email your doctor's office, and request medication refills online. 1. In your internet browser, go to [email or web address] 2. Click on the First Time User? Click Here link in the Sign In box. You will see the New Member Sign Up page. 3. Enter your [employer] Access Code exactly as it appears below. You will not need to use this code after youve completed the sign-up process. If you do not sign up before the expiration date, you must request a new code.  
 
· [employer] Access Code: [email or web address] 
 Expires: 9/12/2017 12:22 PM 
 
4. Enter the last four digits of your Social Security Number (xxxx) and Date of Birth (mm/dd/yyyy) as indicated and click Submit. You will be taken to the next sign-up page. 5. Create a [employer] ID. This will be your [employer] login ID and cannot be changed, so think of one that is secure and easy to remember. 6. Create a [employer] password. You can change your password at any time. 7. Enter your Password Reset Question and Answer. This can be used at a later time if you forget your password. 8. Enter your e-mail address. You will receive e-mail notification when new information is available in [address] 9. Click Sign Up. You can now view and download portions of your medical record. 10. Click the Download Summary menu link to download a portable copy of your medical information. If you have questions, please visit the Frequently Asked Questions section of the [employer] website. Remember, [employer] is NOT to be used for urgent needs. For medical emergencies, dial 911. Now available from your iPhone and Android! Please provide this summary of care documentation to your next provider. Your primary care clinician is listed as [facility]. If you have any questions after today's visit, please call [phone].

## 2017-06-14 NOTE — PROGRESS NOTES
Nursing Notes    Patient presents to the office today in follow-up. Patient rates her pain at 5/10 on the numerical pain scale. Reviewed medications with counts as follows:    Rx Date filled Qty Dispensed Pill Count Last Dose Short   Fentanyl 50 5/2/17 10 2+1 on Placed 6/13/17 no   MS IR 30 5/2/17 90 29 6/14/17 no       Comments: pt has scripts for fentanyl and MS IR to fill 5/16/17    POC UDS was not performed in office today    Any new labs or imaging since last appointment? YES, pre op labs for biliary tube change    Have you been to an emergency room (ER) or urgent care clinic since your last visit? YES            Have you been hospitalized since your last visit? YES     If yes, where, when, and reason for visit? Have you seen or consulted any other health care providers outside of the [address] [place] [address]  since your last visit? YES     If yes, where, when, and reason for visit? [facility] for [facility], hospitalization and surgery     Ms. Kemp has a reminder for a \"due or due soon\" health maintenance. I have asked that she contact her primary care provider for follow-up on this health maintenance.

## 2017-08-09 ENCOUNTER — OFFICE VISIT (OUTPATIENT)
Dept: PAIN MANAGEMENT | Age: 82
End: 2017-08-09

## 2017-08-09 VITALS
SYSTOLIC BLOOD PRESSURE: 107 MMHG | TEMPERATURE: 98.6 F | DIASTOLIC BLOOD PRESSURE: 57 MMHG | RESPIRATION RATE: 15 BRPM | HEIGHT: 66 IN | HEART RATE: 86 BPM | BODY MASS INDEX: 23.46 KG/M2 | WEIGHT: 146 LBS

## 2017-08-09 DIAGNOSIS — M51.36 DDD (DEGENERATIVE DISC DISEASE), LUMBAR: Primary | ICD-10-CM

## 2017-08-09 DIAGNOSIS — M94.211 CHONDROMALACIA OF RIGHT SHOULDER: ICD-10-CM

## 2017-08-09 DIAGNOSIS — Z98.1 S/P LUMBAR SPINAL FUSION: ICD-10-CM

## 2017-08-09 DIAGNOSIS — M54.16 LUMBAR NERVE ROOT IMPINGEMENT: ICD-10-CM

## 2017-08-09 DIAGNOSIS — Z79.899 ENCOUNTER FOR LONG-TERM (CURRENT) USE OF OTHER MEDICATIONS: ICD-10-CM

## 2017-08-09 DIAGNOSIS — M43.16 SPONDYLOLISTHESIS OF LUMBAR REGION: ICD-10-CM

## 2017-08-09 DIAGNOSIS — M96.1 POSTLAMINECTOMY SYNDROME, LUMBAR REGION: ICD-10-CM

## 2017-08-09 DIAGNOSIS — M15.9 OSTEOARTHRITIS OF MULTIPLE JOINTS, UNSPECIFIED OSTEOARTHRITIS TYPE: ICD-10-CM

## 2017-08-09 DIAGNOSIS — M62.838 SPASM OF MUSCLE: ICD-10-CM

## 2017-08-09 DIAGNOSIS — E78.00 HYPERCHOLESTEREMIA: Chronic | ICD-10-CM

## 2017-08-09 DIAGNOSIS — M25.50 POLYARTHRALGIA: ICD-10-CM

## 2017-08-09 DIAGNOSIS — M51.37 DEGENERATION OF LUMBAR OR LUMBOSACRAL INTERVERTEBRAL DISC: ICD-10-CM

## 2017-08-09 DIAGNOSIS — M46.1 SACROILIITIS (HCC): ICD-10-CM

## 2017-08-09 RX ORDER — MORPHINE SULFATE 30 MG/1
15-30 TABLET ORAL
Qty: 90 TAB | Refills: 0 | Status: SHIPPED | OUTPATIENT
Start: 2017-10-06 | End: 2017-10-04 | Stop reason: SDUPTHER

## 2017-08-09 RX ORDER — MORPHINE SULFATE 30 MG/1
15-30 TABLET ORAL
Qty: 90 TAB | Refills: 0 | Status: SHIPPED | OUTPATIENT
Start: 2017-09-06 | End: 2017-10-04 | Stop reason: SDUPTHER

## 2017-08-09 RX ORDER — FENTANYL 50 UG/1
1 PATCH TRANSDERMAL
Qty: 10 PATCH | Refills: 0 | Status: SHIPPED | OUTPATIENT
Start: 2017-08-09 | End: 2018-01-25 | Stop reason: SDUPTHER

## 2017-08-09 RX ORDER — FENTANYL 50 UG/1
1 PATCH TRANSDERMAL
Qty: 10 PATCH | Refills: 0 | Status: SHIPPED | OUTPATIENT
Start: 2017-09-08 | End: 2017-10-04 | Stop reason: SDUPTHER

## 2017-08-09 NOTE — PROGRESS NOTES
Nursing Notes    Patient presents to the office today in follow-up. Reviewed medications with counts as follows:    Rx Date filled Qty Dispensed Pill Count Last Dose Short   Fentanyl 50 mcg 7/5/17 10 4 1 on no   Morphine ir 30 mg 8/7/17 90 91 This am no   Ms. Kemp has a reminder for a \"due or due soon\" health maintenance. I have asked that she contact her primary care provider for follow-up on this health maintenance. Comments: patient returned fentanyl 50 mcg prescription with start date of 7/13/17 to be destroyed    POC UDS was not performed in office today    Any new labs or imaging since last appointment? YES  Labs  Abdominal xrays    Have you been to an emergency room (ER) or urgent care clinic since your last visit? YES            Have you been hospitalized since your last visit? YES     If yes, where, when, and reason for visit? [place] for tube complications     Have you seen or consulted any other health care providers outside of the [address] [place] [address]  since your last visit? YES     If yes, where, when, and reason for visit?

## 2017-08-09 NOTE — PROGRESS NOTES
HISTORY OF PRESENT ILLNESS  Valeria Mckeon is a 80 y.o. female    HPI: Ms. Rj Soriano returns today for f/u of chronic low back pain and multiple joint pain. 2 Prior lumbar surgeries. Lumbar epidural injections in the past with temporary improvement. PT in the past with some improvement. PPMHx: History of cholangitis with chronic indwelling biliary tube     Today is my first visit with Ms. Kemp. She is here today with her son. She is doing well with her current treatment plan. Her son did report one incident since last visit. On August 6, 2017 his mother was complaining of biliary tube pain. At the hospital they found that, \"her drain had folded back on itself in the common duct with the tip directed back into the right lobe of the liver\". Under anesthesia, they had her biliary tube changed. She was discharged and after they got home that evening, Ms. Marry Camarillo was showing decreased responsiveness. Her son reports that she was not as alert as usual and she was moving really really slow \"like a snail\". Her son and daughter helped her to bed and the next morning she was back to normal.  They are not sure why this happened, and are concerned that there was a reaction with the anesthesia she received that day. Her son would like to discuss this further with Dr. Maame Brown at next appointment. We will continue with her current treatment plan unchanged. I will have her follow-up in 2 months for reassessment with Dr. Maame Brown. Current medication management consists of: Fentanyl 50 µg patch every 72 hours and morphine IR 30 mg 3 times daily as needed  Medications are helping with pain control and quality of life. His/Her pain is 5/10 with medication and 10/10 without. Pt describes pain as aching, stabbing, and burning. Aggravating factors include standing, sitting, and walking. Relieved with rest, medication, and avoiding painful activities. The patient reports % relief with current medications.    Current treatment is helping to improve general activity, mood, walking, sleep, enjoyment of life    She  is otherwise doing well with no other complaints today. She denies any adverse events including nausea, vomiting, dizziness, increased constipation, hallucinations, or seizures. The patient reports functional improvement and QOL with pain medication. Vitals:    08/09/17 1113   BP: 107/57   Pulse: 86   Resp: 15   Temp: 98.6 °F (37 °C)   Weight: 66.2 kg (146 lb)   Height: 5' 6\" (1.676 m)   PainSc:   5   PainLoc: Generalized         Allergies   Allergen Reactions    Aspirin Nausea Only       Past Surgical History:   Procedure Laterality Date    COLONOSCOPY  5/29/12    Dr. Neda Cordero, single polyp, 5 yr f/u    HX BUNIONECTOMY      LEFT    HX CARPAL TUNNEL RELEASE  2000    HX CHOLECYSTECTOMY  12/09    GALLSTONE PANCREATITIS    HX LUMBAR LAMINECTOMY  1994    HX TONSIL AND ADENOIDECTOMY           Review of Systems   Constitutional: Negative for chills, diaphoresis, fever, malaise/fatigue and weight loss. HENT: Negative for congestion, ear pain, hearing loss, nosebleeds and sore throat. Gets wax buildup cleaned routinely   Eyes: Negative for blurred vision, double vision, pain and discharge. Dry eyes    Respiratory: Negative for cough, shortness of breath and wheezing. Cardiovascular: Negative for chest pain, palpitations and leg swelling. Gastrointestinal: Positive for constipation. Negative for diarrhea, heartburn, nausea and vomiting. Genitourinary: Negative for dysuria, frequency and urgency. Musculoskeletal: Positive for back pain and joint pain. Negative for falls, myalgias and neck pain. Skin: Negative for itching and rash. Neurological: Negative for dizziness, tingling, tremors, seizures, loss of consciousness, weakness and headaches. Psychiatric/Behavioral: Negative for depression and suicidal ideas. The patient is not nervous/anxious.         Physical Exam   Constitutional: She is oriented to person, place, and time and well-developed, well-nourished, and in no distress. She appears healthy. No distress. HENT:   Head: Normocephalic and atraumatic. Nose: Nose normal.   Eyes: Right eye exhibits no discharge. Left eye exhibits no discharge. Neck: Neck supple. Pulmonary/Chest: Effort normal.   Musculoskeletal: She exhibits tenderness. Right hip: She exhibits tenderness. Left hip: She exhibits tenderness. Right ankle: She exhibits swelling. Left ankle: She exhibits swelling. Lumbar back: She exhibits tenderness. Right lower leg: She exhibits tenderness. Left lower leg: She exhibits tenderness. Neurological: She is alert and oriented to person, place, and time. Skin: Skin is warm and dry. She is not diaphoretic. Psychiatric: Mood and affect normal.   Nursing note and vitals reviewed. ASSESSMENT:    1. DDD (degenerative disc disease), lumbar    2. Degeneration of lumbar or lumbosacral intervertebral disc    3. Lumbar nerve root impingement    4. Spondylolisthesis of lumbar region    5. Chondromalacia of right shoulder    6. S/P lumbar spinal fusion    7. Osteoarthritis of multiple joints, unspecified osteoarthritis type    8. Hypercholesteremia    9. Polyarthralgia    10. Postlaminectomy syndrome, lumbar region    11. Sacroiliitis (HCC)    12. Spasm of muscle    13. Encounter for long-term (current) use of other medications        [place] Prescription Monitoring Program was reviewed which does not demonstrate aberrancies and/or inconsistencies with regard to the historical prescribing of controlled medications to this patient by other providers. Medications were brought to visit today. Pill count was appropriate. The patients condition and plan were discussed. All questions were answered. The patient agrees with the plan. PLAN / Pt Instructions:  1. Continue current plan with no evidence of addiction or diversion. Stable on current medication without adverse events. 2. Refill fentanyl 50 µg/hr patch every 72 hours. 3. Refill morphine IR 30 mg tab, take half to 1 tablet up to 3 times daily as needed. 4. Discussed risks of addiction, dependency, and opioid induced hyperalgesia. 5. Return to clinic in 2 months with Dr. Usman Martinez      Medications Ordered Today   Medications    fentaNYL (DURAGESIC) 50 mcg/hr PATCH     Si Patch by TransDERmal route every seventy-two (72) hours. Max Daily Amount: 1 Patch. For chronic pain     Dispense:  10 Patch     Refill:  0    fentaNYL (DURAGESIC) 50 mcg/hr PATCH     Si Patch by TransDERmal route every seventy-two (72) hours for 30 days. Max Daily Amount: 1 Patch. For chronic pain     Dispense:  10 Patch     Refill:  0    morphine IR (MS IR) 30 mg tablet     Sig: Take 0.5-1 Tabs by mouth three (3) times daily as needed for Pain (rescue) for up to 30 days. Max Daily Amount: 90 mg. Dispense:  90 Tab     Refill:  0    morphine IR (MS IR) 30 mg tablet     Sig: Take 0.5-1 Tabs by mouth three (3) times daily as needed for Pain (rescue) for up to 30 days. Max Daily Amount: 90 mg. Dispense:  90 Tab     Refill:  0       DISPOSITION  · Pain medications are prescribed with the objective of pain relief and improved physical and psychosocial function in this patient. · Counseled patient on proper use of prescribed medications. · Counseled patient about chronic medical conditions and their relationship to anxiety and depression and recommended mental health support as needed. · Reviewed with patient self-help tools, home exercise, and lifestyle changes to assist the patient in self-management of symptoms. · Reviewed with patient the treatment plan, goals of treatment plan, and limitations of treatment plan, to include the potential for side effects from medications and procedures.   If side effects occur, it is the responsibility of the patient to inform the clinic so that a change in the treatment plan can be made in a safe manner. The patient is advised that stopping prescribed medication may cause an increase in symptoms and possible medication withdrawal symptoms. The patient is informed an emergency room evaluation may be necessary if this occurs. Spent 25 minutes with patient today reviewing the treatment plan, goals of treatment plan, and limitations of the treatment plan, to include the potential for side effects from medications and procedures. Lashell Martínez PA-C 8/9/2017        Note: Please excuse any typographical errors. Voice recognition software was used for this note and may cause mistakes.

## 2017-08-09 NOTE — MR AVS SNAPSHOT
Visit Information Date & Time Provider Department Dept. Phone Encounter #  
 8/9/2017 11:00 AM Esau Smith [address] [place] [address] for Pain Management [phone] [id number] Follow-up Instructions Return in about 2 months (around 10/9/2017). Your Appointments 9/18/2017 11:15 AM  
Office Visit with Aleena Ledesma TNT Crowd ([facility]) Appt Note: Return in about 6 months (around 9/20/2017) for HTN, DM, cholesterol [address]  
  
   
 [address] [facility] Upcoming Health Maintenance Date Due  
 EYE EXAM RETINAL OR DILATED Q1 1/13/2017 INFLUENZA AGE 9 TO ADULT 8/1/2017 ZOSTER VACCINE AGE 60> 4/11/2019* HEMOGLOBIN A1C Q6M 9/20/2017 MICROALBUMIN Q1 10/19/2017 GLAUCOMA SCREENING Q2Y 1/13/2018 LIPID PANEL Q1 3/7/2018 MEDICARE YEARLY EXAM 3/21/2018 DTaP/Tdap/Td series (2 - Td) 7/5/2022 *Topic was postponed. The date shown is not the original due date. Allergies as of 8/9/2017  Review Complete On: 8/9/2017 By: Shar Garrison LPN Severity Noted Reaction Type Reactions Aspirin    Nausea Only Current Immunizations  Reviewed on 10/6/2015 Name Date Influenza High Dose Vaccine PF 10/19/2016, 10/6/2015 12:25 PM  
 Influenza Vaccine 9/25/2014 Influenza Vaccine Split 10/25/2012  1:24 PM  
 Pneumococcal Conjugate (PCV-13) 10/19/2016  3:50 PM  
 TDAP Vaccine 7/5/2012 ZZZ-RETIRED (DO NOT USE) Pneumococcal Vaccine (Unspecified Type) 11/18/2009 Not reviewed this visit Vitals BP Pulse Temp Resp Height(growth percentile) Weight(growth percentile) 107/57 86 98.6 °F (37 °C) 15 5' 6\" (1.676 m) 146 lb (66.2 kg) BMI OB Status Smoking Status 23.57 kg/m2 Postmenopausal Never Smoker Vitals History BMI and BSA Data Body Mass Index Body Surface Area 23.57 kg/m 2 1.76 m 2 Preferred Pharmacy Pharmacy Name Phone [facility] [phone], [address] [phone] Your Updated Medication List  
  
   
This list is accurate as of: 8/9/17 12:05 PM.  Always use your most recent med list. amLODIPine 10 mg tablet Commonly known as:  Dewey Cadena TAKE 1 TABLET BY MOUTH DAILY Blood-Glucose Meter monitoring kit Commonly known as:  Brandon Jalloh Use to test blood sugar daily or as directed  
  
 cromolyn 4 % ophthalmic solution Commonly known as:  OPTICROM Administer 1 Drop to both eyes. diclofenac 3 % topical gel Commonly known as:  Hayley Juhi Apply  to affected area two (2) times daily as needed for Pain.  
  
 ergocalciferol 50,000 unit capsule Commonly known as:  VITAMIN D2 Take 1 Cap by mouth every seven (7) days. * fentaNYL 50 mcg/hr PATCH Commonly known as:  DURAGESIC  
1 Patch by TransDERmal route every seventy-two (72) hours. Max Daily Amount: 1 Patch. For chronic pain * fentaNYL 50 mcg/hr PATCH Commonly known as:  DURAGESIC  
1 Patch by TransDERmal route every seventy-two (72) hours for 30 days. Max Daily Amount: 1 Patch. For chronic pain * fentaNYL 50 mcg/hr PATCH Commonly known as:  DURAGESIC  
1 Patch by TransDERmal route every seventy-two (72) hours for 30 days. Max Daily Amount: 1 Patch. For chronic pain * fentaNYL 50 mcg/hr PATCH Commonly known as:  DURAGESIC  
1 Patch by TransDERmal route every seventy-two (72) hours. Max Daily Amount: 1 Patch. For chronic pain Start taking on:  9/8/2017  
  
 furosemide 40 mg tablet Commonly known as:  LASIX Take 1 Tab by mouth daily. HYDROcodone-acetaminophen 5-325 mg per tablet Commonly known as:  Marvelyn Code Take 1 Tab by Mouth Every 4 Hours As Needed for Pain. Lancets Misc Commonly known as:  ONETOUCH ULTRASOFT LANCETS  
 Use to test blood sugar daily or as directed  
  
 lidocaine 5 % ointment Commonly known as:  XYLOCAINE  
USE DAILY AS NEEDED FOR DRESSING CHANGES * morphine IR 30 mg tablet Commonly known as:  MS IR Take 0.5-1 Tabs by mouth three (3) times daily as needed for Pain (rescue) for up to 30 days. Max Daily Amount: 90 mg.  
  
 * morphine IR 30 mg tablet Commonly known as:  MS IR Take 0.5-1 Tabs by mouth three (3) times daily as needed for Pain (rescue) for up to 30 days. Max Daily Amount: 90 mg. Start taking on:  9/6/2017 * morphine IR 30 mg tablet Commonly known as:  MS IR Take 0.5-1 Tabs by mouth three (3) times daily as needed for Pain (rescue) for up to 30 days. Max Daily Amount: 90 mg. Start taking on:  10/6/2017  
  
 mupirocin calcium 2 % topical cream  
Commonly known as:  [employer] [facility] Apply  to affected area two (2) times a day.  
  
 naloxone 4 mg/actuation Spry 4 mg by Nasal route as needed. omeprazole 40 mg capsule Commonly known as:  PRILOSEC Take 1 Cap by mouth daily. ONETOUCH ULTRA TEST strip Generic drug:  glucose blood VI test strips TEST BLOOD SUGAR DAILY OR AS DIRECTED  
  
 potassium chloride 20 mEq tablet Commonly known as:  K-DUR, KLOR-CON  
TAKE 1 TABLET BY MOUTH TWICE DAILY  
  
 rivastigmine 4.6 mg/24 hr patch Commonly known as:  EXELON  
APPLY 1 PATCH EXTERNALLY TO THE SKIN DAILY SITagliptin 50 mg tablet Commonly known as:  Noreene Sly TAKE 1 TABLET BY MOUTH EVERY DAY  
  
 tolterodine ER 4 mg ER capsule Commonly known as:  DETROL LA  
TAKE 1 CAPSULE BY MOUTH DAILY  
  
 ursodiol 300 mg capsule Commonly known as:  ACTIGALL TAKE 1 CAPSULE BY MOUTH TWICE DAILY  
  
 valsartan-hydroCHLOROthiazide 320-25 mg per tablet Commonly known as:  DIOVAN-HCT  
TAKE 1 TABLET BY MOUTH DAILY * Notice: This list has 7 medication(s) that are the same as other medications prescribed for you.  Read the directions carefully, and ask your doctor or other care provider to review them with you. Prescriptions Printed Refills  
 fentaNYL (DURAGESIC) 50 mcg/hr PATCH 0 Starting on: 2017 Si Patch by TransDERmal route every seventy-two (72) hours. Max Daily Amount: 1 Patch. For chronic pain  
 Class: Print Route: TransDERmal  
 fentaNYL (DURAGESIC) 50 mcg/hr PATCH 0 Si Patch by TransDERmal route every seventy-two (72) hours for 30 days. Max Daily Amount: 1 Patch. For chronic pain  
 Class: Print Route: TransDERmal  
 morphine IR (MS IR) 30 mg tablet 0 Starting on: 10/6/2017 Sig: Take 0.5-1 Tabs by mouth three (3) times daily as needed for Pain (rescue) for up to 30 days. Max Daily Amount: 90 mg.  
 Class: Print Route: Oral  
 morphine IR (MS IR) 30 mg tablet 0 Starting on: 2017 Sig: Take 0.5-1 Tabs by mouth three (3) times daily as needed for Pain (rescue) for up to 30 days. Max Daily Amount: 90 mg.  
 Class: Print Route: Oral  
  
Follow-up Instructions Return in about 2 months (around 10/9/2017). Patient Instructions PLAN / Pt Instructions: 1. Continue current plan with no evidence of addiction or diversion. Stable on current medication without adverse events. 2. Refill fentanyl 50 µg patch every 72 hours. 3. Refill morphine IR 30 mg. Take half to 1 tablet up to 3 times daily as needed. 4. Discussed risks of addiction, dependency, and opioid induced hyperalgesia. 5. Return to clinic in 2 months with Dr. Dalia Harris Introducing [facility]! Phoenix Sharif introduces [employer] patient portal. Now you can access parts of your medical record, email your doctor's office, and request medication refills online. 1. In your internet browser, go to [email or web address] 2. Click on the First Time User? Click Here link in the Sign In box. You will see the New Member Sign Up page. 3. Enter your [employer] Access Code exactly as it appears below. You will not need to use this code after youve completed the sign-up process. If you do not sign up before the expiration date, you must request a new code. · [employer] Access Code: [email or web address] 
Expires: 9/12/2017 12:22 PM 
 
4. Enter the last four digits of your Social Security Number (xxxx) and Date of Birth (mm/dd/yyyy) as indicated and click Submit. You will be taken to the next sign-up page. 5. Create a [employer] ID. This will be your [employer] login ID and cannot be changed, so think of one that is secure and easy to remember. 6. Create a [employer] password. You can change your password at any time. 7. Enter your Password Reset Question and Answer. This can be used at a later time if you forget your password. 8. Enter your e-mail address. You will receive e-mail notification when new information is available in [address]. 9. Click Sign Up. You can now view and download portions of your medical record. 10. Click the Download Summary menu link to download a portable copy of your medical information. If you have questions, please visit the Frequently Asked Questions section of the [employer] website. Remember, [employer] is NOT to be used for urgent needs. For medical emergencies, dial 911. Now available from your iPhone and Android! Please provide this summary of care documentation to your next provider. Your primary care clinician is listed as [facility]. If you have any questions after today's visit, please call [phone].

## 2017-08-09 NOTE — PATIENT INSTRUCTIONS
PLAN / Pt Instructions:  1. Continue current plan with no evidence of addiction or diversion. Stable on current medication without adverse events. 2. Refill fentanyl 50 µg patch every 72 hours. 3. Refill morphine IR 30 mg. Take half to 1 tablet up to 3 times daily as needed. 4. Discussed risks of addiction, dependency, and opioid induced hyperalgesia.    5. Return to clinic in 2 months with Dr. Dalia Harris

## 2017-09-18 ENCOUNTER — OFFICE VISIT (OUTPATIENT)
Dept: INTERNAL MEDICINE CLINIC | Age: 82
End: 2017-09-18

## 2017-09-18 VITALS
HEIGHT: 66 IN | OXYGEN SATURATION: 92 % | SYSTOLIC BLOOD PRESSURE: 130 MMHG | RESPIRATION RATE: 18 BRPM | HEART RATE: 66 BPM | WEIGHT: 146 LBS | TEMPERATURE: 98.6 F | DIASTOLIC BLOOD PRESSURE: 64 MMHG | BODY MASS INDEX: 23.46 KG/M2

## 2017-09-18 DIAGNOSIS — E11.9 TYPE 2 DIABETES MELLITUS WITHOUT COMPLICATION, WITHOUT LONG-TERM CURRENT USE OF INSULIN (HCC): Chronic | ICD-10-CM

## 2017-09-18 DIAGNOSIS — Z23 ENCOUNTER FOR IMMUNIZATION: ICD-10-CM

## 2017-09-18 DIAGNOSIS — E78.00 HYPERCHOLESTEREMIA: Chronic | ICD-10-CM

## 2017-09-18 DIAGNOSIS — I10 ESSENTIAL HYPERTENSION: Primary | Chronic | ICD-10-CM

## 2017-09-18 LAB
GLUCOSE POC: 126 MG/DL
HBA1C MFR BLD HPLC: 5.8 %

## 2017-09-18 NOTE — PROGRESS NOTES
HISTORY OF PRESENT ILLNESS  Gudelia Garzon is a 80 y.o. female. Visit Vitals    /64 (BP 1 Location: Left arm, BP Patient Position: Sitting)    Pulse 66    Temp 98.6 °F (37 °C) (Oral)    Resp 18    Ht 5' 6\" (1.676 m)    Wt 146 lb (66.2 kg)    SpO2 92%    BMI 23.57 kg/m2       HPI Comments: Recently at the hospital with problems with her biliary drainage tube. Wax in Ear   The history is provided by the patient (pt has h/o ear wax. Ears feel ful and her hearing is affected). This is a recurrent problem. The current episode started more than 1 week ago. The problem occurs daily. Hypertension    The history is provided by the patient. This is a chronic problem. The current episode started more than 1 week ago. The problem has not changed since onset. There are no associated agents to hypertension. Risk factors include postmenopause and hypertension. Review of Systems   Constitutional: Negative. Cardiovascular: Negative. Gastrointestinal: Negative. Appetite good       Physical Exam   Constitutional: She is oriented to person, place, and time. She appears well-developed and well-nourished. No distress. Cardiovascular: Normal rate and regular rhythm. Pulmonary/Chest: Effort normal and breath sounds normal.   Musculoskeletal: She exhibits no edema. Neurological: She is alert and oriented to person, place, and time. Skin: Skin is warm and dry. She is not diaphoretic. Psychiatric: She has a normal mood and affect. Nursing note and vitals reviewed. ASSESSMENT and PLAN    ICD-10-CM ICD-9-CM    1. Essential hypertension I10 401.9    2. Type 2 diabetes mellitus without complication, without long-term current use of insulin (HCC) E11.9 250.00 AMB POC GLUCOSE BLOOD, BY GLUCOSE MONITORING DEVICE      AMB POC HEMOGLOBIN A1C   3. Hypercholesteremia E78.00 272.0    4.  Encounter for immunization Z23 V03.89 INFLUENZA VIRUS VACCINE, HIGH DOSE SEASONAL, PRESERVATIVE FREE      ADMIN INFLUENZA VIRUS VAC     Available hospital record reviewed     BP controlled    Last lab at [facility] in August. But needs updating glu and MIO2e---------642 and 5.8 by POC today.  Continue same    F/u 4 months

## 2017-09-18 NOTE — PROGRESS NOTES
ROOM # 4    Regina Agrawal presents today for   Chief Complaint   Patient presents with    Follow Up Chronic Condition     htn, diabetes, cholesterol    Wax in Ear     pt's son states that his mother is having a hard time hearing in the left ear and believes that it could be wax       Regina Agrawal preferred language for health care discussion is english/other. Is someone accompanying this pt? Yes, son    Is the patient using any DME equipment during [address]? Yes, rolling walker    Depression Screening:  PHQ over the last two weeks 9/18/2017 5/11/2017 3/20/2017 2/21/2017 4/5/2016 1/22/2015 3/18/2014   Little interest or pleasure in doing things Not at all Not at all Not at all Not at all Not at all Not at all Not at all   Feeling down, depressed or hopeless Not at all Not at all Not at all Not at all Not at all Not at all Not at all   Total Score PHQ 2 0 0 0 0 0 0 0       Learning Assessment:  Learning Assessment 5/26/2015 12/26/2013   PRIMARY LEARNER Patient Patient   HIGHEST LEVEL OF EDUCATION - PRIMARY LEARNER  GRADUATED [address] [facility] LEARNER NONE -   [address]  CAREGIVER Yes -   [address]   LEARNER PREFERENCE PRIMARY READING LISTENING   ANSWERED BY patient patient   RELATIONSHIP SELF SELF       Abuse Screening:  No flowsheet data found. Fall Risk  Fall Risk Assessment, last 12 mths 9/18/2017 5/11/2017 3/20/2017 4/5/2016 1/22/2015 3/18/2014 2/27/2014   Able to walk? Yes Yes Yes Yes Yes Yes Yes   Fall in past 12 months? No No No No No No No       Health Maintenance reviewed and discussed per provider. Yes    Regina Agrawal is due for eye exam .  Please order/place referral if appropriate. Advance Directive:  1. Do you have an advance directive in place? Patient Reply: no    2. If not, would you like material regarding how to put one in place? Patient Reply: no    Coordination of Care:  1.  Have you been to the ER, urgent care clinic since your last visit? Hospitalized since your last visit? yes    2. Have you seen or consulted any other health care providers outside of the [address] [place] [address] since your last visit? Include any pap smears or colon screening. yes      Presented for High Dose Influenza Vaccine. Administered without complaints. Pt observed for 5 min. No adverse reaction noted.  Pt left office in stable condition

## 2017-09-18 NOTE — MR AVS SNAPSHOT
Visit Information Date & Time Provider Department Dept. Phone Encounter #  
 9/18/2017 11:15 AM Ruma Junior, [address] [id number] Follow-up Instructions Return in about 4 months (around 1/18/2018) for HTN, DM, cholesterol. Your Appointments 10/4/2017 11:00 AM  
Follow Up with Yolie Betancourt MD  
[address] [place] [address] for Pain Management [facility]) Appt Note: return in 2 months [address] [phone]  
[phone]  [id number] Upcoming Health Maintenance Date Due  
 EYE EXAM RETINAL OR DILATED Q1 1/13/2017 HEMOGLOBIN A1C Q6M 9/20/2017 INFLUENZA AGE 9 TO ADULT 10/1/2017* ZOSTER VACCINE AGE 60> 4/11/2019* MICROALBUMIN Q1 10/19/2017 GLAUCOMA SCREENING Q2Y 1/13/2018 LIPID PANEL Q1 3/7/2018 MEDICARE YEARLY EXAM 3/21/2018 DTaP/Tdap/Td series (2 - Td) 7/5/2022 *Topic was postponed. The date shown is not the original due date. Allergies as of 9/18/2017  Review Complete On: 9/18/2017 By: Ruma Junior MD  
  
 Severity Noted Reaction Type Reactions Aspirin    Nausea Only Current Immunizations  Reviewed on 10/6/2015 Name Date Influenza High Dose Vaccine PF  Incomplete, 10/19/2016, 10/6/2015 12:25 PM  
 Influenza Vaccine 9/25/2014 Influenza Vaccine Split 10/25/2012  1:24 PM  
 Pneumococcal Conjugate (PCV-13) 10/19/2016  3:50 PM  
 TDAP Vaccine 7/5/2012 ZZZ-RETIRED (DO NOT USE) Pneumococcal Vaccine (Unspecified Type) 11/18/2009 Not reviewed this visit You Were Diagnosed With   
  
 Codes Comments Essential hypertension    -  Primary ICD-10-CM: I10 
ICD-9-CM: 401.9 Type 2 diabetes mellitus without complication, without long-term current use of insulin (HCC)     ICD-10-CM: E11.9 ICD-9-CM: 250.00 Hypercholesteremia     ICD-10-CM: E78.00 ICD-9-CM: 272.0 Encounter for immunization     ICD-10-CM: E50 ICD-9-CM: V03.89 Vitals BP Pulse Temp Resp Height(growth percentile) Weight(growth percentile) 130/64 (BP 1 Location: Left arm, BP Patient Position: Sitting) 66 98.6 °F (37 °C) (Oral) 18 5' 6\" (1.676 m) 146 lb (66.2 kg) SpO2 BMI OB Status Smoking Status 92% 23.57 kg/m2 Postmenopausal Never Smoker Vitals History BMI and BSA Data Body Mass Index Body Surface Area  
 23.57 kg/m 2 1.76 m 2 Preferred Pharmacy Pharmacy Name Phone [facility] [phone], [address] [phone] Your Updated Medication List  
  
   
This list is accurate as of: 9/18/17 12:30 PM.  Always use your most recent med list. amLODIPine 10 mg tablet Commonly known as:  Richard De Luna TAKE 1 TABLET BY MOUTH DAILY Blood-Glucose Meter monitoring kit Commonly known as:  Ivy Santiago Use to test blood sugar daily or as directed  
  
 cromolyn 4 % ophthalmic solution Commonly known as:  OPTICROM Administer 1 Drop to both eyes. diclofenac 3 % topical gel Commonly known as:  Marisela Brown Apply  to affected area two (2) times daily as needed for Pain.  
  
 ergocalciferol 50,000 unit capsule Commonly known as:  VITAMIN D2 Take 1 Cap by mouth every seven (7) days. * fentaNYL 50 mcg/hr PATCH Commonly known as:  DURAGESIC  
1 Patch by TransDERmal route every seventy-two (72) hours. Max Daily Amount: 1 Patch. For chronic pain * fentaNYL 50 mcg/hr PATCH Commonly known as:  DURAGESIC  
1 Patch by TransDERmal route every seventy-two (72) hours. Max Daily Amount: 1 Patch. For chronic pain  
  
 furosemide 40 mg tablet Commonly known as:  LASIX Take 1 Tab by mouth daily. HYDROcodone-acetaminophen 5-325 mg per tablet Commonly known as:  Janas [place] Take 1 Tab by Mouth Every 4 Hours As Needed for Pain. Lancets Misc Commonly known as:  ONETOUCH ULTRASOFT LANCETS Use to test blood sugar daily or as directed  
  
 lidocaine 5 % ointment Commonly known as:  XYLOCAINE  
USE DAILY AS NEEDED FOR DRESSING CHANGES * morphine IR 30 mg tablet Commonly known as:  MS IR Take 0.5-1 Tabs by mouth three (3) times daily as needed for Pain (rescue) for up to 30 days. Max Daily Amount: 90 mg.  
  
 * morphine IR 30 mg tablet Commonly known as:  MS IR Take 0.5-1 Tabs by mouth three (3) times daily as needed for Pain (rescue) for up to 30 days. Max Daily Amount: 90 mg. Start taking on:  10/6/2017  
  
 mupirocin calcium 2 % topical cream  
Commonly known as:  [employer] Apply  to affected area two (2) times a day.  
  
 naloxone 4 mg/actuation nasal spray Commonly known as:  NARCAN  
4 mg by Nasal route as needed. omeprazole 40 mg capsule Commonly known as:  PRILOSEC Take 1 Cap by mouth daily. ONETOUCH ULTRA TEST strip Generic drug:  glucose blood VI test strips TEST BLOOD SUGAR DAILY OR AS DIRECTED  
  
 potassium chloride 20 mEq tablet Commonly known as:  K-DUR, KLOR-CON  
TAKE 1 TABLET BY MOUTH TWICE DAILY  
  
 rivastigmine 4.6 mg/24 hr patch Commonly known as:  EXELON  
APPLY 1 PATCH EXTERNALLY TO THE SKIN DAILY SITagliptin 50 mg tablet Commonly known as:  Pennington Lucio TAKE 1 TABLET BY MOUTH EVERY DAY  
  
 tolterodine ER 4 mg ER capsule Commonly known as:  DETROL LA  
TAKE 1 CAPSULE BY MOUTH DAILY  
  
 ursodiol 300 mg capsule Commonly known as:  ACTIGALL TAKE 1 CAPSULE BY MOUTH TWICE DAILY  
  
 valsartan-hydroCHLOROthiazide 320-25 mg per tablet Commonly known as:  DIOVAN-HCT  
TAKE 1 TABLET BY MOUTH DAILY * Notice: This list has 4 medication(s) that are the same as other medications prescribed for you. Read the directions carefully, and ask your doctor or other care provider to review them with you. We Performed the Following ADMIN INFLUENZA VIRUS VAC [ [facility]] AMB POC GLUCOSE BLOOD, BY GLUCOSE MONITORING DEVICE [99113 CPT(R)] AMB POC HEMOGLOBIN A1C [16745 CPT(R)] INFLUENZA VIRUS VACCINE, HIGH DOSE SEASONAL, PRESERVATIVE FREE [99838 CPT(R)] Follow-up Instructions Return in about 4 months (around 1/18/2018) for HTN, DM, cholesterol. Introducing [facility]! Vini Miranda introduces [employer] patient portal. Now you can access parts of your medical record, email your doctor's office, and request medication refills online. 1. In your internet browser, go to [email or web address] 2. Click on the First Time User? Click Here link in the Sign In box. You will see the New Member Sign Up page. 3. Enter your [employer] Access Code exactly as it appears below. You will not need to use this code after youve completed the sign-up process. If you do not sign up before the expiration date, you must request a new code. · [employer] Access Code: [email or web address] Expires: 12/17/2017 12:18 PM 
 
4. Enter the last four digits of your Social Security Number (xxxx) and Date of Birth (mm/dd/yyyy) as indicated and click Submit. You will be taken to the next sign-up page. 5. Create a Z[employer]ite ID. This will be your [employer] login ID and cannot be changed, so think of one that is secure and easy to remember. 6. Create a [employer] password. You can change your password at any time. 7. Enter your Password Reset Question and Answer. This can be used at a later time if you forget your password. 8. Enter your e-mail address. You will receive e-mail notification when new information is available in [address] 9. Click Sign Up. You can now view and download portions of your medical record. 10. Click the Download Summary menu link to download a portable copy of your medical information.  
 
If you have questions, please visit the Frequently Asked Questions section of the [employer]. Remember, [employer] is NOT to be used for urgent needs. For medical emergencies, dial 911. Now available from your iPhone and Android! Please provide this summary of care documentation to your next provider. Your primary care clinician is listed as [facility]. If you have any questions after today's visit, please call [phone].

## 2017-10-04 ENCOUNTER — OFFICE VISIT (OUTPATIENT)
Dept: PAIN MANAGEMENT | Age: 82
End: 2017-10-04

## 2017-10-04 VITALS
SYSTOLIC BLOOD PRESSURE: 154 MMHG | DIASTOLIC BLOOD PRESSURE: 67 MMHG | BODY MASS INDEX: 23.57 KG/M2 | HEART RATE: 83 BPM | WEIGHT: 146 LBS | TEMPERATURE: 97.2 F | RESPIRATION RATE: 20 BRPM

## 2017-10-04 DIAGNOSIS — M43.16 SPONDYLOLISTHESIS OF LUMBAR REGION: ICD-10-CM

## 2017-10-04 DIAGNOSIS — M54.5 CHRONIC BILATERAL LOW BACK PAIN, WITH SCIATICA PRESENCE UNSPECIFIED: Primary | ICD-10-CM

## 2017-10-04 DIAGNOSIS — M51.37 DEGENERATION OF LUMBAR OR LUMBOSACRAL INTERVERTEBRAL DISC: ICD-10-CM

## 2017-10-04 DIAGNOSIS — Z98.1 S/P LUMBAR SPINAL FUSION: ICD-10-CM

## 2017-10-04 DIAGNOSIS — M51.36 DDD (DEGENERATIVE DISC DISEASE), LUMBAR: ICD-10-CM

## 2017-10-04 DIAGNOSIS — M25.50 POLYARTHRALGIA: ICD-10-CM

## 2017-10-04 DIAGNOSIS — G89.29 CHRONIC BILATERAL LOW BACK PAIN, WITH SCIATICA PRESENCE UNSPECIFIED: Primary | ICD-10-CM

## 2017-10-04 DIAGNOSIS — M47.816 LUMBAR ARTHROPATHY: ICD-10-CM

## 2017-10-04 DIAGNOSIS — M94.211 CHONDROMALACIA OF RIGHT SHOULDER: ICD-10-CM

## 2017-10-04 DIAGNOSIS — M96.1 LUMBAR POST-LAMINECTOMY SYNDROME: ICD-10-CM

## 2017-10-04 DIAGNOSIS — M47.817 LUMBOSACRAL SPONDYLOSIS WITHOUT MYELOPATHY: ICD-10-CM

## 2017-10-04 DIAGNOSIS — M15.9 OSTEOARTHRITIS OF MULTIPLE JOINTS, UNSPECIFIED OSTEOARTHRITIS TYPE: ICD-10-CM

## 2017-10-04 DIAGNOSIS — M96.1 POSTLAMINECTOMY SYNDROME, LUMBAR REGION: ICD-10-CM

## 2017-10-04 RX ORDER — MORPHINE SULFATE 30 MG/1
15-30 TABLET ORAL
Qty: 90 TAB | Refills: 0 | Status: SHIPPED | OUTPATIENT
Start: 2017-10-06 | End: 2017-11-30 | Stop reason: SDUPTHER

## 2017-10-04 RX ORDER — FENTANYL 50 UG/1
1 PATCH TRANSDERMAL
Qty: 10 PATCH | Refills: 0 | Status: SHIPPED | OUTPATIENT
Start: 2017-11-03 | End: 2017-11-30 | Stop reason: SDUPTHER

## 2017-10-04 RX ORDER — FENTANYL 50 UG/1
1 PATCH TRANSDERMAL
Qty: 10 PATCH | Refills: 0 | Status: SHIPPED | OUTPATIENT
Start: 2017-10-04 | End: 2017-11-30 | Stop reason: SDUPTHER

## 2017-10-04 RX ORDER — MORPHINE SULFATE 30 MG/1
15-30 TABLET ORAL
Qty: 90 TAB | Refills: 0 | Status: SHIPPED | OUTPATIENT
Start: 2017-11-03 | End: 2017-11-30 | Stop reason: SDUPTHER

## 2017-10-04 NOTE — MR AVS SNAPSHOT
Visit Information Date & Time Provider Department Dept. Phone Encounter #  
 10/4/2017 11:00 AM Silvestre Nicole MD [facility] [address] Pain Management [phone] Follow-up Instructions Return in about 2 months (around 12/4/2017). Follow-up and Disposition History Your Appointments 12/11/2017 11:15 AM  
Office Visit with Naveed Roblero [facility] ([facility]) Appt Note: 3 month f/u DM  
 [address]  
  
   
 [address] [facility] Upcoming Health Maintenance Date Due  
 EYE EXAM RETINAL OR DILATED Q1 1/13/2017 MICROALBUMIN Q1 10/19/2017 ZOSTER VACCINE AGE 60> 4/11/2019* GLAUCOMA SCREENING Q2Y 1/13/2018 LIPID PANEL Q1 3/7/2018 HEMOGLOBIN A1C Q6M 3/18/2018 MEDICARE YEARLY EXAM 3/21/2018 DTaP/Tdap/Td series (2 - Td) 7/5/2022 *Topic was postponed. The date shown is not the original due date. Allergies as of 10/4/2017  Review Complete On: 10/4/2017 By: Silvestre Nicole MD  
  
 Severity Noted Reaction Type Reactions Aspirin    Nausea Only Current Immunizations  Reviewed on 10/6/2015 Name Date Influenza High Dose Vaccine PF 9/18/2017, 10/19/2016, 10/6/2015 12:25 PM  
 Influenza Vaccine 9/25/2014 Influenza Vaccine Split 10/25/2012  1:24 PM  
 Pneumococcal Conjugate (PCV-13) 10/19/2016  3:50 PM  
 TDAP Vaccine 7/5/2012 ZZZ-RETIRED (DO NOT USE) Pneumococcal Vaccine (Unspecified Type) 11/18/2009 Not reviewed this visit You Were Diagnosed With   
  
 Codes Comments Chronic bilateral low back pain, with sciatica presence unspecified    -  Primary ICD-10-CM: M54.5, G89.29 ICD-9-CM: 724.2, 338.29   
 DDD (degenerative disc disease), lumbar     ICD-10-CM: M51.36 
ICD-9-CM: 722.52 Lumbar arthropathy (HCC)     ICD-10-CM: M46.96 
ICD-9-CM: 721.3 Spondylolisthesis of lumbar region     ICD-10-CM: M43.16 
ICD-9-CM: 738.4 Lumbar post-laminectomy syndrome     ICD-10-CM: M96.1 ICD-9-CM: 722.83 S/P lumbar spinal fusion     ICD-10-CM: Z98.1 ICD-9-CM: V45.4 Polyarthralgia     ICD-10-CM: M25.50 ICD-9-CM: 719.49 Osteoarthritis of multiple joints, unspecified osteoarthritis type     ICD-10-CM: M15.9 ICD-9-CM: 715.89 Chondromalacia of right shoulder     ICD-10-CM: M94.211 ICD-9-CM: 733.92 Postlaminectomy syndrome, lumbar region     ICD-10-CM: M96.1 ICD-9-CM: 722.83 Degeneration of lumbar or lumbosacral intervertebral disc     ICD-10-CM: M51.37 
ICD-9-CM: 722.52 Lumbosacral spondylosis without myelopathy     ICD-10-CM: O61.620 ICD-9-CM: 721.3 Vitals BP Pulse Temp Resp Weight(growth percentile) BMI  
 154/67 83 97.2 °F (36.2 °C) 20 146 lb (66.2 kg) 23.57 kg/m2 OB Status Smoking Status Postmenopausal Never Smoker Vitals History BMI and BSA Data Body Mass Index Body Surface Area  
 23.57 kg/m 2 1.76 m 2 Preferred Pharmacy Pharmacy Name Phone [phone] [phone] Your Updated Medication List  
  
   
This list is accurate as of: 10/4/17 12:02 PM.  Always use your most recent med list. amLODIPine 10 mg tablet Commonly known as:  Estefani Fairbanks TAKE 1 TABLET BY MOUTH DAILY Blood-Glucose Meter monitoring kit Commonly known as:  Florentino Elizabeth Use to test blood sugar daily or as directed  
  
 cromolyn 4 % ophthalmic solution Commonly known as:  OPTICROM Administer 1 Drop to both eyes. diclofenac 3 % topical gel Commonly known as:  Johan  Apply  to affected area two (2) times daily as needed for Pain.  
  
 ergocalciferol 50,000 unit capsule Commonly known as:  VITAMIN D2 Take 1 Cap by mouth every seven (7) days. * fentaNYL 50 mcg/hr PATCH Commonly known as:  DURAGESIC  
1 Patch by TransDERmal route every seventy-two (72) hours. Max Daily Amount: 1 Patch. For chronic pain * fentaNYL 50 mcg/hr PATCH Commonly known as:  DURAGESIC  
1 Patch by TransDERmal route every seventy-two (72) hours. Max Daily Amount: 1 Patch. For chronic pain Start taking on:  11/3/2017  
  
 furosemide 40 mg tablet Commonly known as:  LASIX Take 1 Tab by mouth daily. HYDROcodone-acetaminophen 5-325 mg per tablet Commonly known as:  Birda Gadsden Take 1 Tab by Mouth Every 4 Hours As Needed for Pain. Lancets Misc Commonly known as:  ONETOUCH ULTRASOFT LANCETS Use to test blood sugar daily or as directed  
  
 lidocaine 5 % ointment Commonly known as:  XYLOCAINE  
USE DAILY AS NEEDED FOR DRESSING CHANGES * morphine IR 30 mg tablet Commonly known as:  MS IR Take 0.5-1 Tabs by mouth three (3) times daily as needed for Pain (rescue) for up to 30 days. Max Daily Amount: 90 mg. Start taking on:  10/6/2017 * morphine IR 30 mg tablet Commonly known as:  MS IR Take 0.5-1 Tabs by mouth three (3) times daily as needed for Pain (rescue) for up to 30 days. Max Daily Amount: 90 mg. Start taking on:  11/3/2017  
  
 mupirocin calcium 2 % topical cream  
Commonly known as:  [employer] Apply  to affected area two (2) times a day.  
  
 naloxone 4 mg/actuation nasal spray Commonly known as:  NARCAN  
4 mg by Nasal route as needed. omeprazole 40 mg capsule Commonly known as:  PRILOSEC Take 1 Cap by mouth daily. ONETOUCH ULTRA TEST strip Generic drug:  glucose blood VI test strips TEST BLOOD SUGAR DAILY OR AS DIRECTED  
  
 potassium chloride 20 mEq tablet Commonly known as:  K-DUR, KLOR-CON  
TAKE 1 TABLET BY MOUTH TWICE DAILY  
  
 rivastigmine 4.6 mg/24 hr patch Commonly known as:  EXELON  
APPLY 1 PATCH EXTERNALLY TO THE SKIN DAILY SITagliptin 50 mg tablet Commonly known as:  Cari Stake TAKE 1 TABLET BY MOUTH EVERY DAY  
  
 tolterodine ER 4 mg ER capsule Commonly known as:  DETROL LA  
TAKE 1 CAPSULE BY MOUTH DAILY  
  
 ursodiol 300 mg capsule Commonly known as:  ACTIGALL TAKE 1 CAPSULE BY MOUTH TWICE DAILY  
  
 valsartan-hydroCHLOROthiazide 320-25 mg per tablet Commonly known as:  DIOVAN-HCT  
TAKE 1 TABLET BY MOUTH DAILY * Notice: This list has 4 medication(s) that are the same as other medications prescribed for you. Read the directions carefully, and ask your doctor or other care provider to review them with you. Prescriptions Printed Refills  
 fentaNYL (DURAGESIC) 50 mcg/hr PATCH 0 Si Patch by TransDERmal route every seventy-two (72) hours. Max Daily Amount: 1 Patch. For chronic pain  
 Class: Print Route: TransDERmal  
 morphine IR (MS IR) 30 mg tablet 0 Starting on: 10/6/2017 Sig: Take 0.5-1 Tabs by mouth three (3) times daily as needed for Pain (rescue) for up to 30 days. Max Daily Amount: 90 mg.  
 Class: Print Route: Oral  
 morphine IR (MS IR) 30 mg tablet 0 Starting on: 11/3/2017 Sig: Take 0.5-1 Tabs by mouth three (3) times daily as needed for Pain (rescue) for up to 30 days. Max Daily Amount: 90 mg.  
 Class: Print Route: Oral  
 fentaNYL (DURAGESIC) 50 mcg/hr PATCH 0 Starting on: 11/3/2017 Si Patch by TransDERmal route every seventy-two (72) hours. Max Daily Amount: 1 Patch. For chronic pain  
 Class: Print Route: TransDERmal  
  
Follow-up Instructions Return in about 2 months (around 2017). Introducing [facility]! Filiberto Tejada introduces [facility] patient portal. Now you can access parts of your medical record, email your doctor's office, and request medication refills online. 1. In your internet browser, go to [email or web address] 2. Click on the First Time User? Click Here link in the Sign In box.  You will see the New Member Sign Up page. 3. Enter your [email or web address] Access Code exactly as it appears below. You will not need to use this code after youve completed the sign-up process. If you do not sign up before the expiration date, you must request a new code. · [email or web address] Access Code: [email or web address] Expires: 12/17/2017 12:18 PM 
 
4. Enter the last four digits of your Social Security Number (xxxx) and Date of Birth (mm/dd/yyyy) as indicated and click Submit. You will be taken to the next sign-up page. 5. Create a [email or web address] ID. This will be your [email or web address] login ID and cannot be changed, so think of one that is secure and easy to remember. 6. Create a [email or web address] password. You can change your password at any time. 7. Enter your Password Reset Question and Answer. This can be used at a later time if you forget your password. 8. Enter your e-mail address. You will receive e-mail notification when new information is available in [address]. 9. Click Sign Up. You can now view and download portions of your medical record. 10. Click the Download Summary menu link to download a portable copy of your medical information. If you have questions, please visit the Frequently Asked Questions section of the [email or web address] website. Remember, [email or web address] is NOT to be used for urgent needs. For medical emergencies, dial 911. Now available from your iPhone and Android! Please provide this summary of care documentation to your next provider. Your primary care clinician is listed as [facility]. If you have any questions after today's visit, please call [phone].

## 2017-10-04 NOTE — PROGRESS NOTES
Nursing Notes    Patient presents to the office today in follow-up. Patient rates her pain at 5/10 on the numerical pain scale. Reviewed medications with counts as follows:    Rx Date filled Qty Dispensed Pill Count Last Dose Short     Fentanyl 50mcg  09/03/17 10 5 10/03/17 No    Morphine sulfate 30mg IR 09/22/17 90 69 This am  No                                     Comments: patient noted with unused prescription for fentanyl 50mcg due to fill on 09/08/17 and morphine sulfate 30mg IR due to fill on 10/06/17. POC UDS was performed in office today    Any new labs or imaging since last appointment? YES    Have you been to an emergency room (ER) or urgent care clinic since your last visit? YES            Have you been hospitalized since your last visit? YES     If yes, where, when, and reason for visit? Have you seen or consulted any other health care providers outside of the [address] [place] [address]  since your last visit? YES     If yes, where, when, and reason for visit? Emergency dept physicians      HM deferred to pcp. Oral swab provided this visit.

## 2017-10-04 NOTE — PROGRESS NOTES
HISTORY OF PRESENT ILLNESS  Carmencita Jerome is a 80 y.o. female. HPI Comments: Visit survey reviewed  Chief complaint- chronic pain syndrome- low back pain, pain to different joints  She is here with her son. He is the primary caregiver. Immediate release morphine 30 mg 3 times a day as needed  Fentanyl patch 50 mcg every 3 day  Medication helps improve general activity, walking, sleep, enjoyment of life    No new significant side effects reported  Medication continues to help improve quality of life. Medications reviewed including risk and benefits. Recent average level of pain-5    Measurement of clinical outcome for chronic pain patient/ SPAASMS Score Card-            Information reviewed and will be scanned. Total score today-3      Review of Systems   Constitutional: Negative for chills and fever. HENT: Negative for ear discharge and ear pain. Eyes: Negative for pain and discharge. Respiratory: Negative for hemoptysis and shortness of breath. Cardiovascular: Negative for chest pain and leg swelling. Gastrointestinal: Positive for constipation. Musculoskeletal: Positive for back pain and myalgias. Skin: Negative for itching and rash. Neurological: Negative for dizziness. Psychiatric/Behavioral: Negative for hallucinations and suicidal ideas. All other systems reviewed and are negative. Physical Exam   Constitutional: She appears well-developed and well-nourished. She is cooperative. She does not have a sickly appearance. HENT:   Head: Normocephalic and atraumatic. Right Ear: External ear normal. No drainage. Left Ear: External ear normal. No drainage. Nose: Nose normal.   Eyes: Lids are normal. Right eye exhibits no discharge. Left eye exhibits no discharge. Right conjunctiva has no hemorrhage. Left conjunctiva has no hemorrhage. Neck: Neck supple. No tracheal deviation present. No thyroid mass present.    Pulmonary/Chest: Effort normal. No respiratory distress. Neurological: She is alert. No cranial nerve deficit. Gait with RW   Skin: Skin is intact. No rash noted. She is not diaphoretic. Psychiatric: Her speech is normal and behavior is normal. Her mood appears anxious. Her affect is not angry. She does not express inappropriate judgment. She does not exhibit a depressed mood. Nursing note and vitals reviewed. ASSESSMENT and PLAN  Encounter Diagnoses   Name Primary?  Chronic bilateral low back pain, with sciatica presence unspecified Yes    DDD (degenerative disc disease), lumbar     Lumbar arthropathy (HCC)     Spondylolisthesis of lumbar region     Lumbar post-laminectomy syndrome     S/P lumbar spinal fusion     Polyarthralgia     Osteoarthritis of multiple joints, unspecified osteoarthritis type     Chondromalacia of right shoulder     Postlaminectomy syndrome, lumbar region     Degeneration of lumbar or lumbosacral intervertebral disc     Lumbosacral spondylosis without myelopathy    --Urine test or oral swab today. Also, the prescription monitoring program was reviewed today. The majority of today's visit was spent counseling and coordinating care. Total visit time-40 minutes. -Dragon medical dictation software was used for portions of this report. Unintended errors may occur. No indicators for recent medication misuse.  reviewed. Pain Meds and Quality Of Life have been reviewed. Nonpharmacologic therapy and non-opioid pharmacologic therapy were considered. If opioid therapy is prescribed, this is only if the expected benefits are anticipated to outweigh risks. Possible changes to treatment plan considered. Support/education given as needed. Today-medications are as listed. No significant changes to medications. Follow up -- 2 months.

## 2017-11-30 ENCOUNTER — OFFICE VISIT (OUTPATIENT)
Dept: PAIN MANAGEMENT | Age: 82
End: 2017-11-30

## 2017-11-30 VITALS
HEIGHT: 66 IN | DIASTOLIC BLOOD PRESSURE: 45 MMHG | RESPIRATION RATE: 12 BRPM | SYSTOLIC BLOOD PRESSURE: 112 MMHG | WEIGHT: 150 LBS | TEMPERATURE: 97.9 F | BODY MASS INDEX: 24.11 KG/M2 | HEART RATE: 83 BPM

## 2017-11-30 DIAGNOSIS — M96.1 LUMBAR POST-LAMINECTOMY SYNDROME: ICD-10-CM

## 2017-11-30 DIAGNOSIS — Z98.1 S/P LUMBAR SPINAL FUSION: ICD-10-CM

## 2017-11-30 DIAGNOSIS — M54.5 CHRONIC BILATERAL LOW BACK PAIN, WITH SCIATICA PRESENCE UNSPECIFIED: Primary | ICD-10-CM

## 2017-11-30 DIAGNOSIS — G89.29 CHRONIC BILATERAL LOW BACK PAIN, WITH SCIATICA PRESENCE UNSPECIFIED: Primary | ICD-10-CM

## 2017-11-30 DIAGNOSIS — M25.50 POLYARTHRALGIA: ICD-10-CM

## 2017-11-30 DIAGNOSIS — M96.1 POSTLAMINECTOMY SYNDROME, LUMBAR REGION: ICD-10-CM

## 2017-11-30 DIAGNOSIS — M15.9 OSTEOARTHRITIS OF MULTIPLE JOINTS, UNSPECIFIED OSTEOARTHRITIS TYPE: ICD-10-CM

## 2017-11-30 DIAGNOSIS — M43.16 SPONDYLOLISTHESIS OF LUMBAR REGION: ICD-10-CM

## 2017-11-30 DIAGNOSIS — M51.36 DDD (DEGENERATIVE DISC DISEASE), LUMBAR: ICD-10-CM

## 2017-11-30 DIAGNOSIS — M47.816 LUMBAR ARTHROPATHY: ICD-10-CM

## 2017-11-30 RX ORDER — MORPHINE SULFATE 30 MG/1
15-30 TABLET ORAL
Qty: 90 TAB | Refills: 0 | Status: SHIPPED | OUTPATIENT
Start: 2017-12-29 | End: 2018-01-28

## 2017-11-30 RX ORDER — FENTANYL 50 UG/1
1 PATCH TRANSDERMAL
Qty: 10 PATCH | Refills: 0 | Status: SHIPPED | OUTPATIENT
Start: 2017-12-29 | End: 2018-04-30 | Stop reason: SDUPTHER

## 2017-11-30 RX ORDER — FENTANYL 50 UG/1
1 PATCH TRANSDERMAL
Qty: 10 PATCH | Refills: 0 | Status: SHIPPED | OUTPATIENT
Start: 2017-11-30 | End: 2018-08-16

## 2017-11-30 RX ORDER — MORPHINE SULFATE 30 MG/1
15-30 TABLET ORAL
Qty: 90 TAB | Refills: 0 | Status: SHIPPED | OUTPATIENT
Start: 2017-11-30 | End: 2018-01-25 | Stop reason: SDUPTHER

## 2017-11-30 NOTE — PROGRESS NOTES
HISTORY OF PRESENT ILLNESS  Noe Laureano is a 80 y.o. female. HPI Comments: Visit survey reviewed  Chief complaint- chronic pain syndrome- low back pain, pain to different joints  She is accompanied by her son today who is her primary caregiver  Medication helps general activity, walking, sleep, enjoyment of life  She will continue with fentanyl patch 50 mcg every 3 days  Immediate release morphine 30 mg 3 times a day as needed    No new significant side effects reported  Medication continues to help improve quality of life. Medications reviewed including risk and benefits. Recent average level of pain-5          Review of Systems   Constitutional: Negative for chills and fever. HENT: Negative for ear discharge and ear pain. Eyes: Negative for pain and discharge. Respiratory: Negative for hemoptysis and shortness of breath. Cardiovascular: Negative for chest pain and leg swelling. Gastrointestinal: Positive for constipation. Musculoskeletal: Positive for back pain and myalgias. Skin: Negative for itching and rash. Neurological: Negative for dizziness. Psychiatric/Behavioral: Negative for hallucinations and suicidal ideas. All other systems reviewed and are negative. Physical Exam   Constitutional: She appears well-developed and well-nourished. She is cooperative. She does not have a sickly appearance. HENT:   Head: Normocephalic and atraumatic. Right Ear: External ear normal. No drainage. Left Ear: External ear normal. No drainage. Nose: Nose normal.   Eyes: Lids are normal. Right eye exhibits no discharge. Left eye exhibits no discharge. Right conjunctiva has no hemorrhage. Left conjunctiva has no hemorrhage. Neck: Neck supple. No tracheal deviation present. No thyroid mass present. Pulmonary/Chest: Effort normal. No respiratory distress. Neurological: She is alert. No cranial nerve deficit. Gait with RW   Skin: Skin is intact. No rash noted.  She is not diaphoretic. Psychiatric: Her speech is normal and behavior is normal. Her mood appears anxious. Her affect is not angry. She does not express inappropriate judgment. She does not exhibit a depressed mood. Nursing note and vitals reviewed. ASSESSMENT and PLAN  Encounter Diagnoses   Name Primary?  Chronic bilateral low back pain, with sciatica presence unspecified Yes    DDD (degenerative disc disease), lumbar     Lumbar arthropathy (HCC)     Spondylolisthesis of lumbar region     Lumbar post-laminectomy syndrome     S/P lumbar spinal fusion     Polyarthralgia     Osteoarthritis of multiple joints, unspecified osteoarthritis type     Postlaminectomy syndrome, lumbar region    No indicators for recent medication misuse.  reviewed. Pain Meds and Quality Of Life have been reviewed. Nonpharmacologic therapy and non-opioid pharmacologic therapy were considered. If opioid therapy is prescribed, this is only if the expected benefits are anticipated to outweigh risks. Possible changes to treatment plan considered. Support/education given as needed. Today-medications are as listed. No significant changes to medications. Follow up -- 2 months.     Urine test or oral swab today

## 2017-11-30 NOTE — MR AVS SNAPSHOT
Visit Information Date & Time Provider Department Dept. Phone Encounter #  
 11/30/2017 11:00 AM Shea Au MD [facility] for Pain Management [phone] [id number] Follow-up Instructions Return in about 2 months (around 1/30/2018). Follow-up and Disposition History Your Appointments 12/11/2017 11:15 AM  
Office Visit with M[address] ([address]) Appt Note: 3 month f/u DM  
 [address]  
  
   
 [address] [facility] Upcoming Health Maintenance Date Due  
 EYE EXAM RETINAL OR DILATED Q1 1/13/2017 MICROALBUMIN Q1 10/19/2017 GLAUCOMA SCREENING Q2Y 1/13/2018 ZOSTER VACCINE AGE 60> 4/11/2019* LIPID PANEL Q1 3/7/2018 HEMOGLOBIN A1C Q6M 3/18/2018 MEDICARE YEARLY EXAM 3/21/2018 DTaP/Tdap/Td series (2 - Td) 7/5/2022 *Topic was postponed. The date shown is not the original due date. Allergies as of 11/30/2017  Review Complete On: 11/30/2017 By: Shea Au MD  
  
 Severity Noted Reaction Type Reactions Aspirin    Nausea Only Current Immunizations  Reviewed on 10/6/2015 Name Date Influenza High Dose Vaccine PF 9/18/2017, 10/19/2016, 10/6/2015 12:25 PM  
 Influenza Vaccine 9/25/2014 Influenza Vaccine Split 10/25/2012  1:24 PM  
 Pneumococcal Conjugate (PCV-13) 10/19/2016  3:50 PM  
 TDAP Vaccine 7/5/2012 ZZZ-RETIRED (DO NOT USE) Pneumococcal Vaccine (Unspecified Type) 11/18/2009 Not reviewed this visit You Were Diagnosed With   
  
 Codes Comments Chronic bilateral low back pain, with sciatica presence unspecified    -  Primary ICD-10-CM: M54.5, G89.29 ICD-9-CM: 724.2, 338.29   
 DDD (degenerative disc disease), lumbar     ICD-10-CM: M51.36 
ICD-9-CM: 722.52 Lumbar arthropathy (HCC)     ICD-10-CM: M46.96 
ICD-9-CM: 721.3 Spondylolisthesis of lumbar region     ICD-10-CM: M43.16 
ICD-9-CM: 738.4 Lumbar post-laminectomy syndrome     ICD-10-CM: M96.1 ICD-9-CM: 722.83 S/P lumbar spinal fusion     ICD-10-CM: Z98.1 ICD-9-CM: V45.4 Polyarthralgia     ICD-10-CM: M25.50 ICD-9-CM: 719.49 Osteoarthritis of multiple joints, unspecified osteoarthritis type     ICD-10-CM: M15.9 ICD-9-CM: 715.89 Postlaminectomy syndrome, lumbar region     ICD-10-CM: M96.1 ICD-9-CM: 722.83 Vitals BP Pulse Temp Resp Height(growth percentile) Weight(growth percentile) 112/45 (BP 1 Location: Right arm, BP Patient Position: Sitting) 83 97.9 °F (36.6 °C) (Oral) 12 5' 6\" (1.676 m) 150 lb (68 kg) BMI OB Status Smoking Status 24.21 kg/m2 Postmenopausal Never Smoker Vitals History BMI and BSA Data Body Mass Index Body Surface Area  
 24.21 kg/m 2 1.78 m 2 Preferred Pharmacy Pharmacy Name Phone [facility] [address], [address] [phone] Your Updated Medication List  
  
   
This list is accurate as of: 11/30/17 12:05 PM.  Always use your most recent med list. amLODIPine 10 mg tablet Commonly known as:  Shayy Spruce TAKE 1 TABLET BY MOUTH DAILY Blood-Glucose Meter monitoring kit Commonly known as:  Jase Luz Use to test blood sugar daily or as directed  
  
 cromolyn 4 % ophthalmic solution Commonly known as:  OPTICROM Administer 1 Drop to both eyes. diclofenac 3 % topical gel Commonly known as:  Maverick Powell Apply  to affected area two (2) times daily as needed for Pain.  
  
 ergocalciferol 50,000 unit capsule Commonly known as:  VITAMIN D2 Take 1 Cap by mouth every seven (7) days. * fentaNYL 50 mcg/hr PATCH Commonly known as:  DURAGESIC  
1 Patch by TransDERmal route every seventy-two (72) hours. Max Daily Amount: 1 Patch. For chronic pain * fentaNYL 50 mcg/hr PATCH Commonly known as:  DURAGESIC  
1 Patch by TransDERmal route every seventy-two (72) hours. Max Daily Amount: 1 Patch. For chronic pain Start taking on:  12/29/2017  
  
 furosemide 40 mg tablet Commonly known as:  LASIX Take 1 Tab by mouth daily. HYDROcodone-acetaminophen 5-325 mg per tablet Commonly known as:  Rolinda Doles Take 1 Tab by Mouth Every 4 Hours As Needed for Pain. Lancets Misc Commonly known as:  ONETOUCH ULTRASOFT LANCETS Use to test blood sugar daily or as directed  
  
 lidocaine 5 % ointment Commonly known as:  XYLOCAINE  
USE DAILY AS NEEDED FOR DRESSING CHANGES * morphine IR 30 mg tablet Commonly known as:  MS IR Take 0.5-1 Tabs by mouth three (3) times daily as needed for Pain (rescue) for up to 30 days. Max Daily Amount: 90 mg.  
  
 * morphine IR 30 mg tablet Commonly known as:  MS IR Take 0.5-1 Tabs by mouth three (3) times daily as needed for Pain (rescue) for up to 30 days. Max Daily Amount: 90 mg. Start taking on:  12/29/2017  
  
 mupirocin calcium 2 % topical cream  
Commonly known as:  [employer] Apply  to affected area two (2) times a day.  
  
 naloxone 4 mg/actuation nasal spray Commonly known as:  NARCAN  
4 mg by Nasal route as needed. omeprazole 40 mg capsule Commonly known as:  PRILOSEC Take 1 Cap by mouth daily. ONETOUCH ULTRA TEST strip Generic drug:  glucose blood VI test strips TEST BLOOD SUGAR DAILY OR AS DIRECTED  
  
 potassium chloride 20 mEq tablet Commonly known as:  K-DUR, KLOR-CON  
TAKE 1 TABLET BY MOUTH TWICE DAILY  
  
 rivastigmine 4.6 mg/24 hr patch Commonly known as:  EXELON  
APPLY 1 PATCH EXTERNALLY TO THE SKIN DAILY SITagliptin 50 mg tablet Commonly known as:  Winters Roel TAKE 1 TABLET BY MOUTH EVERY DAY  
  
 tolterodine ER 4 mg ER capsule Commonly known as:  DETROL LA  
TAKE 1 CAPSULE BY MOUTH DAILY  
  
 ursodiol 300 mg capsule Commonly known as:  ACTIGALL TAKE 1 CAPSULE BY MOUTH TWICE DAILY  
  
 valsartan-hydroCHLOROthiazide 320-25 mg per tablet Commonly known as:  DIOVAN-HCT  
TAKE 1 TABLET BY MOUTH DAILY * Notice: This list has 4 medication(s) that are the same as other medications prescribed for you. Read the directions carefully, and ask your doctor or other care provider to review them with you. Prescriptions Printed Refills  
 fentaNYL (DURAGESIC) 50 mcg/hr PATCH 0 Si Patch by TransDERmal route every seventy-two (72) hours. Max Daily Amount: 1 Patch. For chronic pain  
 Class: Print Route: TransDERmal  
 morphine IR (MS IR) 30 mg tablet 0 Sig: Take 0.5-1 Tabs by mouth three (3) times daily as needed for Pain (rescue) for up to 30 days. Max Daily Amount: 90 mg.  
 Class: Print Route: Oral  
 fentaNYL (DURAGESIC) 50 mcg/hr PATCH 0 Starting on: 2017 Si Patch by TransDERmal route every seventy-two (72) hours. Max Daily Amount: 1 Patch. For chronic pain  
 Class: Print Route: TransDERmal  
 morphine IR (MS IR) 30 mg tablet 0 Starting on: 2017 Sig: Take 0.5-1 Tabs by mouth three (3) times daily as needed for Pain (rescue) for up to 30 days. Max Daily Amount: 90 mg.  
 Class: Print Route: Oral  
  
Follow-up Instructions Return in about 2 months (around 2018). Introducing [facility]! [facility] introduces Complexa patient portal. Now you can access parts of your medical record, email your doctor's office, and request medication refills online. 1. In your internet browser, go to [email or web address] 2. Click on the First Time User? Click Here link in the Sign In box. You will see the New Member Sign Up page. 3. Enter your Complexa Access Code exactly as it appears below. You will not need to use this code after youve completed the sign-up process.  If you do not sign up before the expiration date, you must request a new code. · StoryPress Access Code: [email or web address] Expires: 12/17/2017 11:18 AM 
 
4. Enter the last four digits of your Social Security Number (xxxx) and Date of Birth (mm/dd/yyyy) as indicated and click Submit. You will be taken to the next sign-up page. 5. Create a StoryPress ID. This will be your StoryPress login ID and cannot be changed, so think of one that is secure and easy to remember. 6. Create a StoryPress password. You can change your password at any time. 7. Enter your Password Reset Question and Answer. This can be used at a later time if you forget your password. 8. Enter your e-mail address. You will receive e-mail notification when new information is available in [address] 9. Click Sign Up. You can now view and download portions of your medical record. 10. Click the Download Summary menu link to download a portable copy of your medical information. If you have questions, please visit the Frequently Asked Questions section of the StoryPress website. Remember, StoryPress is NOT to be used for urgent needs. For medical emergencies, dial 911. Now available from your iPhone and Android! Please provide this summary of care documentation to your next provider. Your primary care clinician is listed as [facility]. If you have any questions after today's visit, please call [phone].

## 2017-11-30 NOTE — PROGRESS NOTES
Nursing Notes    Patient presents to the office today in follow-up. Patient rates her pain at 6/10 on the numerical pain scale. Reviewed medications with counts as follows:    Rx Date filled Qty Dispensed Pill Count Last Dose Short   Fentanyl 50 mcg/hr - pt has one unfilled prescription 11/05/17 10 3+1 on Current patch on back per pt no    morphine sulfate IR 30 mg - pt has one unfilled prescription 11/05/17 90 44 today no                           POC UDS was performed in office today. Any new labs or imaging since last appointment? NO    Have you been to an emergency room (ER) or urgent care clinic since your last visit? NO            Have you been hospitalized since your last visit? NO     If yes, where, when, and reason for visit? Have you seen or consulted any other health care providers outside of the [address] [place] [address]  since your last visit? YES     If yes, where, when, and reason for visit? Digestive specialist    HM deferred to pcp.

## 2017-12-11 ENCOUNTER — OFFICE VISIT (OUTPATIENT)
Dept: INTERNAL MEDICINE CLINIC | Age: 82
End: 2017-12-11

## 2017-12-11 VITALS
RESPIRATION RATE: 14 BRPM | WEIGHT: 150 LBS | HEIGHT: 66 IN | SYSTOLIC BLOOD PRESSURE: 147 MMHG | BODY MASS INDEX: 24.11 KG/M2 | HEART RATE: 52 BPM | DIASTOLIC BLOOD PRESSURE: 67 MMHG | TEMPERATURE: 97.7 F

## 2017-12-11 DIAGNOSIS — E11.9 TYPE 2 DIABETES MELLITUS WITHOUT COMPLICATION, WITHOUT LONG-TERM CURRENT USE OF INSULIN (HCC): Primary | Chronic | ICD-10-CM

## 2017-12-11 DIAGNOSIS — E78.00 HYPERCHOLESTEREMIA: Chronic | ICD-10-CM

## 2017-12-11 DIAGNOSIS — I10 ESSENTIAL HYPERTENSION: Chronic | ICD-10-CM

## 2017-12-11 LAB
GLUCOSE POC: 117 MG/DL
HBA1C MFR BLD HPLC: 5.6 %

## 2017-12-11 NOTE — PROGRESS NOTES
ROOM # 4    Audrey Ball presents today for   Chief Complaint   Patient presents with    Diabetes     3 month f/u       Audrey Ball preferred language for health care discussion is english/other. Is someone accompanying this pt? Yes    Is the patient using any DME equipment during OV? Yes, rolling walker    Depression Screening:  PHQ over the last two weeks 12/11/2017 9/18/2017 5/11/2017 3/20/2017 2/21/2017 4/5/2016 1/22/2015   Little interest or pleasure in doing things Not at all Not at all Not at all Not at all Not at all Not at all Not at all   Feeling down, depressed or hopeless Not at all Not at all Not at all Not at all Not at all Not at all Not at all   Total Score PHQ 2 0 0 0 0 0 0 0       Learning Assessment:  Learning Assessment 5/26/2015 12/26/2013   PRIMARY LEARNER Patient Patient   HIGHEST LEVEL OF EDUCATION - PRIMARY LEARNER  [address] LEARNER NONE -   [address] CAREGIVER Yes -   Sigifredo [address]   LEARNER PREFERENCE PRIMARY READING LISTENING   ANSWERED BY patient patient   RELATIONSHIP SELF SELF       Abuse Screening:  Abuse Screening Questionnaire 11/30/2017   Do you ever feel afraid of your partner? N   Are you in a relationship with someone who physically or mentally threatens you? N   Is it safe for you to go home? Y       Fall Risk  Fall Risk Assessment, last 12 mths 9/18/2017 5/11/2017 3/20/2017 4/5/2016 1/22/2015 3/18/2014 2/27/2014   Able to walk? Yes Yes Yes Yes Yes Yes Yes   Fall in past 12 months? No No No No No No No       Health Maintenance reviewed and discussed per provider. Yes    Audrey Ball is due for eye exam(Dr Jerome, pt not sure when last visit was) microalbumin. Please order/place referral if appropriate. Advance Directive:  1. Do you have an advance directive in place? Patient Reply: no    2. If not, would you like material regarding how to put one in place?  Patient Reply: no    Coordination of Care:  1. Have you been to the ER, urgent care clinic since your last visit? Hospitalized since your last visit? no    2. Have you seen or consulted any other health care providers outside of the [address] [place] [address] since your last visit? Include any pap smears or colon screening.  no

## 2017-12-11 NOTE — PROGRESS NOTES
HISTORY OF PRESENT ILLNESS  Timur Yu is a 80 y.o. female. Visit Vitals    /67    Pulse (!) 52    Temp 97.7 °F (36.5 °C) (Oral)    Resp 14    Ht 5' 6\" (1.676 m)    Wt 150 lb (68 kg)    BMI 24.21 kg/m2       Diabetes   The history is provided by the patient. This is a chronic problem. The current episode started more than 1 week ago. The problem occurs daily. The problem has not changed since onset. Pertinent negatives include no chest pain, no abdominal pain, no headaches and no shortness of breath. Exacerbated by: diet. The symptoms are relieved by medications (diet). Treatments tried: see med list. The treatment provided moderate relief. Hypertension    The history is provided by the patient. This is a chronic problem. The current episode started more than 1 week ago. The problem has not changed since onset. Pertinent negatives include no chest pain, no palpitations, no headaches, no dizziness and no shortness of breath. There are no associated agents to hypertension. Risk factors include postmenopause, hypertension, diabetes mellitus, dyslipidemia and a sedentary lifestyle. Review of Systems   Constitutional: Negative for chills and fever. Respiratory: Negative for shortness of breath. Cardiovascular: Negative for chest pain and palpitations. Gastrointestinal: Negative for abdominal pain, constipation and diarrhea. Neurological: Negative for dizziness and headaches. Physical Exam   Constitutional: She is oriented to person, place, and time. She appears well-developed and well-nourished. No distress. Cardiovascular: Normal rate and regular rhythm. Pulmonary/Chest: Effort normal and breath sounds normal.   Musculoskeletal: She exhibits edema (left ankle edema). Neurological: She is alert and oriented to person, place, and time. Skin: Skin is warm and dry. She is not diaphoretic. Psychiatric: She has a normal mood and affect.    Nursing note and vitals reviewed. ASSESSMENT and PLAN    ICD-10-CM ICD-9-CM    1. Type 2 diabetes mellitus without complication, without long-term current use of insulin (HCC) E11.9 250.00 AMB POC GLUCOSE BLOOD, BY GLUCOSE MONITORING DEVICE      AMB POC HEMOGLOBIN A1C   2. Essential hypertension I10 401.9    3. Hypercholesteremia E78.00 272.0        BP up some, coming down with rest.    DM controlled. Recent lab at the ER in November.  But needs HBa1c    Otherwise continue same    F/u 4 months for MWV, HTN, DM

## 2017-12-11 NOTE — MR AVS SNAPSHOT
Elizabeth Melodie 
 
 
 [address] [facility] [address] 
[phone] Patient: Alicia Bee MRN: [id number] Visit Information Date & Time Provider Department Dept. Phone Encounter #  
 2017 11:15 AM Blair Pina MD [facility] [phone] [id number] Follow-up Instructions Return in about 4 months (around 2018) for Medicare Wellness Visit, HTN, DM, cholesterol. Your Appointments 2018 11:50 AM  
Office Visit with Kevin Moore MD  
[address] [place] [address] for Pain Management [facility]) Appt Note: 2 mon f/u per gs. ..to  
 [id number] [phone] [facility] [phone] Upcoming Health Maintenance Date Due  
 GLAUCOMA SCREENING Q2Y 2018 EYE EXAM RETINAL OR DILATED Q1 3/11/2018* MICROALBUMIN Q1 2018* ZOSTER VACCINE AGE 60> 2019* LIPID PANEL Q1 3/7/2018 HEMOGLOBIN A1C Q6M 3/18/2018 MEDICARE YEARLY EXAM 3/21/2018 DTaP/Tdap/Td series (2 - Td) 2022 *Topic was postponed. The date shown is not the original due date. Allergies as of 2017  Review Complete On: 2017 By: Blair Pina MD  
  
 Severity Noted Reaction Type Reactions Aspirin    Nausea Only Current Immunizations  Reviewed on 10/6/2015 Name Date Influenza High Dose Vaccine PF 2017, 10/19/2016, 10/6/2015 12:25 PM  
 Influenza Vaccine 2014 Influenza Vaccine Split 10/25/2012  1:24 PM  
 Pneumococcal Conjugate (PCV-13) 10/19/2016  3:50 PM  
 TDAP Vaccine 2012 ZZZ-RETIRED (DO NOT USE) Pneumococcal Vaccine (Unspecified Type) 2009 Not reviewed this visit You Were Diagnosed With   
  
 Codes Comments Type 2 diabetes mellitus without complication, without long-term current use of insulin (HCC)    -  Primary ICD-10-CM: E11.9 ICD-9-CM: 250.00 Essential hypertension     ICD-10-CM: I10 
ICD-9-CM: 401.9 Hypercholesteremia     ICD-10-CM: E78.00 ICD-9-CM: 272.0 Vitals BP Pulse Temp Resp Height(growth percentile) Weight(growth percentile) 147/67 (!) 52 97.7 °F (36.5 °C) (Oral) 14 5' 6\" (1.676 m) 150 lb (68 kg) BMI OB Status Smoking Status 24.21 kg/m2 Postmenopausal Never Smoker Vitals History BMI and BSA Data Body Mass Index Body Surface Area  
 24.21 kg/m 2 1.78 m 2 Preferred Pharmacy Pharmacy Name Phone [facility] [address], [address] [phone] Your Updated Medication List  
  
   
This list is accurate as of: 12/11/17 12:02 PM.  Always use your most recent med list. amLODIPine 10 mg tablet Commonly known as:  Varsha Prabhakar TAKE 1 TABLET BY MOUTH DAILY Blood-Glucose Meter monitoring kit Commonly known as:  Adrian Carry Use to test blood sugar daily or as directed  
  
 cromolyn 4 % ophthalmic solution Commonly known as:  OPTICROM Administer 1 Drop to both eyes. diclofenac 3 % topical gel Commonly known as:  Thermon Shames Apply  to affected area two (2) times daily as needed for Pain.  
  
 ergocalciferol 50,000 unit capsule Commonly known as:  VITAMIN D2 Take 1 Cap by mouth every seven (7) days. * fentaNYL 50 mcg/hr PATCH Commonly known as:  DURAGESIC  
1 Patch by TransDERmal route every seventy-two (72) hours. Max Daily Amount: 1 Patch. For chronic pain * fentaNYL 50 mcg/hr PATCH Commonly known as:  DURAGESIC  
1 Patch by TransDERmal route every seventy-two (72) hours. Max Daily Amount: 1 Patch. For chronic pain Start taking on:  12/29/2017  
  
 furosemide 40 mg tablet Commonly known as:  LASIX Take 1 Tab by mouth daily. HYDROcodone-acetaminophen 5-325 mg per tablet Commonly known as:  Pratibha Vasquez  
 Take 1 Tab by Mouth Every 4 Hours As Needed for Pain. Lancets Misc Commonly known as:  ONETOUCH ULTRASOFT LANCETS Use to test blood sugar daily or as directed  
  
 lidocaine 5 % ointment Commonly known as:  XYLOCAINE  
USE DAILY AS NEEDED FOR DRESSING CHANGES * morphine IR 30 mg tablet Commonly known as:  MS IR Take 0.5-1 Tabs by mouth three (3) times daily as needed for Pain (rescue) for up to 30 days. Max Daily Amount: 90 mg.  
  
 * morphine IR 30 mg tablet Commonly known as:  MS IR Take 0.5-1 Tabs by mouth three (3) times daily as needed for Pain (rescue) for up to 30 days. Max Daily Amount: 90 mg. Start taking on:  12/29/2017  
  
 mupirocin calcium 2 % topical cream  
Commonly known as:  [employer] Apply  to affected area two (2) times a day.  
  
 naloxone 4 mg/actuation nasal spray Commonly known as:  NARCAN  
4 mg by Nasal route as needed. omeprazole 40 mg capsule Commonly known as:  PRILOSEC Take 1 Cap by mouth daily. ONETOUCH ULTRA TEST strip Generic drug:  glucose blood VI test strips TEST BLOOD SUGAR DAILY OR AS DIRECTED  
  
 potassium chloride 20 mEq tablet Commonly known as:  K-DUR, KLOR-CON  
TAKE 1 TABLET BY MOUTH TWICE DAILY  
  
 rivastigmine 4.6 mg/24 hr patch Commonly known as:  EXELON  
APPLY 1 PATCH EXTERNALLY TO THE SKIN DAILY SITagliptin 50 mg tablet Commonly known as:  Merline Pesa TAKE 1 TABLET BY MOUTH EVERY DAY  
  
 tolterodine ER 4 mg ER capsule Commonly known as:  DETROL LA  
TAKE 1 CAPSULE BY MOUTH DAILY  
  
 ursodiol 300 mg capsule Commonly known as:  ACTIGALL TAKE 1 CAPSULE BY MOUTH TWICE DAILY  
  
 valsartan-hydroCHLOROthiazide 320-25 mg per tablet Commonly known as:  DIOVAN-HCT  
TAKE 1 TABLET BY MOUTH DAILY * Notice: This list has 4 medication(s) that are the same as other medications prescribed for you.  Read the directions carefully, and ask your doctor or other care provider to review them with you. We Performed the Following AMB POC GLUCOSE BLOOD, BY GLUCOSE MONITORING DEVICE [87453 CPT(R)] AMB POC HEMOGLOBIN A1C [64803 CPT(R)] Follow-up Instructions Return in about 4 months (around 4/11/2018) for Medicare Wellness Visit, HTN, DM, cholesterol. Introducing [facility]! Jessica Linda introduces [employer] patient portal. Now you can access parts of your medical record, email your doctor's office, and request medication refills online. 1. In your internet browser, go to [email or web address] 2. Click on the First Time User? Click Here link in the Sign In box. You will see the New Member Sign Up page. 3. Enter your [employer] Access Code exactly as it appears below. You will not need to use this code after youve completed the sign-up process. If you do not sign up before the expiration date, you must request a new code. · [employer] Access Code: [email or web address] Expires: 12/17/2017 11:18 AM 
 
4. Enter the last four digits of your Social Security Number (xxxx) and Date of Birth (mm/dd/yyyy) as indicated and click Submit. You will be taken to the next sign-up page. 5. Create a T[employer] Gr[employer] ID. This will be your [employer] login ID and cannot be changed, so think of one that is secure and easy to remember. 6. Create a [employer] password. You can change your password at any time. 7. Enter your Password Reset Question and Answer. This can be used at a later time if you forget your password. 8. Enter your e-mail address. You will receive e-mail notification when new information is available in [address] 9. Click Sign Up. You can now view and download portions of your medical record. 10. Click the Download Summary menu link to download a portable copy of your medical information.  
 
If you have questions, please visit the Frequently Asked Questions section of the [employer]. Remember, [employer] Technologies[employer] is NOT to be used for urgent needs. For medical emergencies, dial 911. Now available from your iPhone and Android! Please provide this summary of care documentation to your next provider. Your primary care clinician is listed as [facility]. If you have any questions after today's visit, please call [phone].

## 2018-01-25 ENCOUNTER — OFFICE VISIT (OUTPATIENT)
Dept: PAIN MANAGEMENT | Age: 83
End: 2018-01-25

## 2018-01-25 VITALS
SYSTOLIC BLOOD PRESSURE: 147 MMHG | RESPIRATION RATE: 18 BRPM | HEIGHT: 66 IN | BODY MASS INDEX: 24.11 KG/M2 | TEMPERATURE: 98 F | WEIGHT: 150 LBS | DIASTOLIC BLOOD PRESSURE: 67 MMHG | HEART RATE: 83 BPM

## 2018-01-25 DIAGNOSIS — M46.1 SACROILIITIS (HCC): ICD-10-CM

## 2018-01-25 DIAGNOSIS — M51.36 DDD (DEGENERATIVE DISC DISEASE), LUMBAR: Primary | ICD-10-CM

## 2018-01-25 DIAGNOSIS — M25.50 POLYARTHRALGIA: ICD-10-CM

## 2018-01-25 DIAGNOSIS — Z79.899 ENCOUNTER FOR LONG-TERM (CURRENT) USE OF HIGH-RISK MEDICATION: ICD-10-CM

## 2018-01-25 DIAGNOSIS — M15.9 OSTEOARTHRITIS OF MULTIPLE JOINTS, UNSPECIFIED OSTEOARTHRITIS TYPE: ICD-10-CM

## 2018-01-25 DIAGNOSIS — M96.1 POSTLAMINECTOMY SYNDROME, LUMBAR REGION: ICD-10-CM

## 2018-01-25 DIAGNOSIS — M96.1 LUMBAR POST-LAMINECTOMY SYNDROME: ICD-10-CM

## 2018-01-25 DIAGNOSIS — M51.37 DEGENERATION OF LUMBAR OR LUMBOSACRAL INTERVERTEBRAL DISC: ICD-10-CM

## 2018-01-25 DIAGNOSIS — M43.16 SPONDYLOLISTHESIS OF LUMBAR REGION: ICD-10-CM

## 2018-01-25 DIAGNOSIS — M62.838 SPASM OF MUSCLE: ICD-10-CM

## 2018-01-25 RX ORDER — FENTANYL 50 UG/1
1 PATCH TRANSDERMAL
Qty: 10 PATCH | Refills: 0 | Status: SHIPPED | OUTPATIENT
Start: 2018-03-23 | End: 2018-04-30 | Stop reason: SDUPTHER

## 2018-01-25 RX ORDER — FENTANYL 50 UG/1
1 PATCH TRANSDERMAL
Qty: 10 PATCH | Refills: 0 | Status: SHIPPED | OUTPATIENT
Start: 2018-02-24 | End: 2018-04-30 | Stop reason: SDUPTHER

## 2018-01-25 RX ORDER — MORPHINE SULFATE 30 MG/1
15-30 TABLET ORAL
Qty: 90 TAB | Refills: 0 | Status: SHIPPED | OUTPATIENT
Start: 2018-03-23 | End: 2018-04-30 | Stop reason: SDUPTHER

## 2018-01-25 RX ORDER — MORPHINE SULFATE 30 MG/1
15-30 TABLET ORAL
Qty: 90 TAB | Refills: 0 | Status: SHIPPED | OUTPATIENT
Start: 2018-02-24 | End: 2018-03-26

## 2018-01-25 RX ORDER — FENTANYL 50 UG/1
1 PATCH TRANSDERMAL
Qty: 10 PATCH | Refills: 0 | Status: SHIPPED | OUTPATIENT
Start: 2018-01-25 | End: 2018-04-30 | Stop reason: SDUPTHER

## 2018-01-25 RX ORDER — MORPHINE SULFATE 30 MG/1
15-30 TABLET ORAL
Qty: 90 TAB | Refills: 0 | Status: SHIPPED | OUTPATIENT
Start: 2018-01-25 | End: 2018-02-24

## 2018-01-25 NOTE — MR AVS SNAPSHOT
[address] [address] 
[phone] Patient: Kaleb Brown MRN: [id number] Visit Information Date & Time Provider Department Dept. Phone Encounter #  
 2018  8:20 AM Dona Estevez [address] for Pain Management  Follow-up Instructions Return in about 3 months (around 2018). Your Appointments 2018 11:00 AM  
Office Visit with Denise Davalos MD  
[address]) Appt Note: 4 month f/u MWV, HTn, Dm, chol  
 [address]  
  
   
 [address] [facility] Upcoming Health Maintenance Date Due  
 GLAUCOMA SCREENING Q2Y 2018 EYE EXAM RETINAL OR DILATED Q1 3/11/2018* MICROALBUMIN Q1 2018* ZOSTER VACCINE AGE 60> 2019* LIPID PANEL Q1 3/7/2018 MEDICARE YEARLY EXAM 3/21/2018 HEMOGLOBIN A1C Q6M 2018 DTaP/Tdap/Td series (2 - Td) 2022 *Topic was postponed. The date shown is not the original due date. Allergies as of 2018  Review Complete On: 2018 By: Anurag Wolf PA-C Severity Noted Reaction Type Reactions Aspirin    Nausea Only Current Immunizations  Reviewed on 10/6/2015 Name Date Influenza High Dose Vaccine PF 2017, 10/19/2016, 10/6/2015 12:25 PM  
 Influenza Vaccine 2014 Influenza Vaccine Split 10/25/2012  1:24 PM  
 Pneumococcal Conjugate (PCV-13) 10/19/2016  3:50 PM  
 TDAP Vaccine 2012 ZZZ-RETIRED (DO NOT USE) Pneumococcal Vaccine (Unspecified Type) 2009 Not reviewed this visit You Were Diagnosed With   
  
 Codes Comments DDD (degenerative disc disease), lumbar    -  Primary ICD-10-CM: M51.36 
ICD-9-CM: 722.52  Degeneration of lumbar or lumbosacral intervertebral disc     ICD-10-CM: M51.37 
 ICD-9-CM: 722.52 Spondylolisthesis of lumbar region     ICD-10-CM: M43.16 
ICD-9-CM: 738.4 Postlaminectomy syndrome, lumbar region     ICD-10-CM: M96.1 ICD-9-CM: 722.83 Polyarthralgia     ICD-10-CM: M25.50 ICD-9-CM: 719.49 Osteoarthritis of multiple joints, unspecified osteoarthritis type     ICD-10-CM: M15.9 ICD-9-CM: 715.89 Lumbar post-laminectomy syndrome     ICD-10-CM: M96.1 ICD-9-CM: 722.83 Sacroiliitis ([address]     ICD-10-CM: M46.1 ICD-9-CM: 720.2 Spasm of muscle     ICD-10-CM: O79.946 ICD-9-CM: 728.85 Encounter for long-term (current) use of high-risk medication     ICD-10-CM: Z79.899 ICD-9-CM: V58.69 Vitals BP Pulse Temp Resp Height(growth percentile) Weight(growth percentile) 147/67 (BP 1 Location: Right arm, BP Patient Position: Sitting) 83 98 °F (36.7 °C) (Oral) 18 5' 6\" (1.676 m) 150 lb (68 kg) BMI OB Status Smoking Status 24.21 kg/m2 Postmenopausal Never Smoker BMI and BSA Data Body Mass Index Body Surface Area  
 24.21 kg/m 2 1.78 m 2 Preferred Pharmacy Pharmacy Name Phone [facility] [address], [address] [phone] Your Updated Medication List  
  
   
This list is accurate as of: 1/25/18  9:02 AM.  Always use your most recent med list. amLODIPine 10 mg tablet Commonly known as:  Jillyn Boast TAKE 1 TABLET BY MOUTH DAILY Blood-Glucose Meter monitoring kit Commonly known as:  Florina Bonilla Use to test blood sugar daily or as directed  
  
 cromolyn 4 % ophthalmic solution Commonly known as:  OPTICROM Administer 1 Drop to both eyes. diclofenac 3 % topical gel Commonly known as:  Samia Sharon Apply  to affected area two (2) times daily as needed for Pain.  
  
 ergocalciferol 50,000 unit capsule Commonly known as:  VITAMIN D2 Take 1 Cap by mouth every seven (7) days. * fentaNYL 50 mcg/hr PATCH Commonly known as:  DURAGESIC  
1 Patch by TransDERmal route every seventy-two (72) hours. Max Daily Amount: 1 Patch. For chronic pain * fentaNYL 50 mcg/hr PATCH Commonly known as:  DURAGESIC  
1 Patch by TransDERmal route every seventy-two (72) hours. Max Daily Amount: 1 Patch. For chronic pain * fentaNYL 50 mcg/hr PATCH Commonly known as:  DURAGESIC  
1 Patch by TransDERmal route every seventy-two (72) hours for 30 days. Max Daily Amount: 1 Patch. For chronic pain * fentaNYL 50 mcg/hr PATCH Commonly known as:  DURAGESIC  
1 Patch by TransDERmal route every seventy-two (72) hours. Max Daily Amount: 1 Patch. For chronic pain Start taking on:  2/24/2018 * fentaNYL 50 mcg/hr PATCH Commonly known as:  DURAGESIC  
1 Patch by TransDERmal route every seventy-two (72) hours. Max Daily Amount: 1 Patch. For chronic pain Start taking on:  3/23/2018  
  
 furosemide 40 mg tablet Commonly known as:  LASIX Take 1 Tab by mouth daily. HYDROcodone-acetaminophen 5-325 mg per tablet Commonly known as:  Iman Gut Take 1 Tab by Mouth Every 4 Hours As Needed for Pain. Lancets Misc Commonly known as:  ONETOUCH ULTRASOFT LANCETS Use to test blood sugar daily or as directed  
  
 lidocaine 5 % ointment Commonly known as:  XYLOCAINE  
USE DAILY AS NEEDED FOR DRESSING CHANGES * morphine IR 30 mg tablet Commonly known as:  MS IR Take 0.5-1 Tabs by mouth three (3) times daily as needed for Pain (rescue) for up to 30 days. Max Daily Amount: 90 mg.  
  
 * morphine IR 30 mg tablet Commonly known as:  MS IR Take 0.5-1 Tabs by mouth three (3) times daily as needed for Pain (rescue) for up to 30 days. Max Daily Amount: 90 mg.  
  
 * morphine IR 30 mg tablet Commonly known as:  MS IR Take 0.5-1 Tabs by mouth three (3) times daily as needed for Pain (rescue) for up to 30 days. Max Daily Amount: 90 mg. Start taking on:  2/24/2018 * morphine IR 30 mg tablet Commonly known as:  MS IR Take 0.5-1 Tabs by mouth three (3) times daily as needed for Pain (rescue) for up to 30 days. Max Daily Amount: 90 mg. Start taking on:  3/23/2018  
  
 mupirocin calcium 2 % topical cream  
Commonly known as:  [employer] Apply  to affected area two (2) times a day.  
  
 naloxone 4 mg/actuation nasal spray Commonly known as:  NARCAN  
4 mg by Nasal route as needed. omeprazole 40 mg capsule Commonly known as:  PRILOSEC Take 1 Cap by mouth daily. ONETOUCH ULTRA TEST strip Generic drug:  glucose blood VI test strips TEST BLOOD SUGAR DAILY OR AS DIRECTED  
  
 potassium chloride 20 mEq tablet Commonly known as:  K-DUR, KLOR-CON  
TAKE 1 TABLET BY MOUTH TWICE DAILY  
  
 rivastigmine 4.6 mg/24 hr patch Commonly known as:  EXELON  
APPLY 1 PATCH EXTERNALLY TO THE SKIN DAILY SITagliptin 50 mg tablet Commonly known as:  Qasim Bame TAKE 1 TABLET BY MOUTH EVERY DAY  
  
 tolterodine ER 4 mg ER capsule Commonly known as:  DETROL LA  
TAKE 1 CAPSULE BY MOUTH DAILY  
  
 ursodiol 300 mg capsule Commonly known as:  ACTIGALL TAKE 1 CAPSULE BY MOUTH TWICE DAILY  
  
 valsartan-hydroCHLOROthiazide 320-25 mg per tablet Commonly known as:  DIOVAN-HCT  
TAKE 1 TABLET BY MOUTH DAILY * Notice: This list has 9 medication(s) that are the same as other medications prescribed for you. Read the directions carefully, and ask your doctor or other care provider to review them with you. Prescriptions Printed Refills  
 morphine IR (MS IR) 30 mg tablet 0 Starting on: 3/23/2018 Sig: Take 0.5-1 Tabs by mouth three (3) times daily as needed for Pain (rescue) for up to 30 days. Max Daily Amount: 90 mg.  
 Class: Print Route: Oral  
 morphine IR (MS IR) 30 mg tablet 0 Starting on: 2/24/2018  Sig: Take 0.5-1 Tabs by mouth three (3) times daily as needed for Pain (rescue) for up to 30 days. Max Daily Amount: 90 mg.  
 Class: Print Route: Oral  
 morphine IR (MS IR) 30 mg tablet 0 Sig: Take 0.5-1 Tabs by mouth three (3) times daily as needed for Pain (rescue) for up to 30 days. Max Daily Amount: 90 mg.  
 Class: Print Route: Oral  
 fentaNYL (DURAGESIC) 50 mcg/hr PATCH 0 Starting on: 3/23/2018 Si Patch by TransDERmal route every seventy-two (72) hours. Max Daily Amount: 1 Patch. For chronic pain  
 Class: Print Route: TransDERmal  
 fentaNYL (DURAGESIC) 50 mcg/hr PATCH 0 Starting on: 2018 Si Patch by TransDERmal route every seventy-two (72) hours. Max Daily Amount: 1 Patch. For chronic pain  
 Class: Print Route: TransDERmal  
 fentaNYL (DURAGESIC) 50 mcg/hr PATCH 0 Si Patch by TransDERmal route every seventy-two (72) hours for 30 days. Max Daily Amount: 1 Patch. For chronic pain  
 Class: Print Route: TransDERmal  
  
Follow-up Instructions Return in about 3 months (around 2018). Introducing [facility]! [address] [place] [address] introduces "[employer]" patient portal. Now you can access parts of your medical record, email your doctor's office, and request medication refills online. 1. In your internet browser, go to [email or web address] 2. Click on the First Time User? Click Here link in the Sign In box. You will see the New Member Sign Up page. 3. Enter your "[employer]" Access Code exactly as it appears below. You will not need to use this code after youve completed the sign-up process. If you do not sign up before the expiration date, you must request a new code. · "[employer]" Access Code: [email or web address] Expires: 2018  9:02 AM 
 
4. Enter the last four digits of your Social Security Number (xxxx) and Date of Birth (mm/dd/yyyy) as indicated and click Submit. You will be taken to the next sign-up page. 5. Create a "[employer]" ID.  This will be your "[employer]" login ID and cannot be changed, so think of one that is secure and easy to remember. 6. Create a [employer] password. You can change your password at any time. 7. Enter your Password Reset Question and Answer. This can be used at a later time if you forget your password. 8. Enter your e-mail address. You will receive e-mail notification when new information is available in [address] 9. Click Sign Up. You can now view and download portions of your medical record. 10. Click the Download Summary menu link to download a portable copy of your medical information. If you have questions, please visit the Frequently Asked Questions section of the [employer] website. Remember, [employer] is NOT to be used for urgent needs. For medical emergencies, dial 911. Now available from your iPhone and Android! Please provide this summary of care documentation to your next provider. Your primary care clinician is listed as [facility]. If you have any questions after today's visit, please call [phone].

## 2018-01-25 NOTE — PROGRESS NOTES
Nursing Notes    Patient presents to the office today in follow-up. Patient rates her pain at 5/10 on the numerical pain scale. Reviewed medications with counts as follows:    Rx Date filled Qty Dispensed Pill Count Last Dose Short   Fentanyl 50 mcg+2 prescriptions 12/21/17 10 3+1 on today no   Morphine 30 mg+2 prescriptions 12/21/17 90 9 This a.m no                                POC UDS was not performed in office today    Any new labs or imaging since last appointment? NO    Have you been to an emergency room (ER) or urgent care clinic since your last visit? NO            Have you been hospitalized since your last visit? NO     If yes, where, when, and reason for visit? Have you seen or consulted any other health care providers outside of the [address] [place] [address]  since your last visit? NO     If yes, where, when, and reason for visit? HM deferred to pcp.

## 2018-01-25 NOTE — PROGRESS NOTES
HISTORY OF PRESENT ILLNESS  Ivan Quinteros is a 80 y.o. female    Ms. Kemp returns today for f/u of chronic low back pain and multiple joint pain. She has 2 Prior lumbar surgeries. She has had lumbar epidural injections in the past with temporary improvement. PT in the past with some improvement. PPMHx: History of cholangitis with chronic indwelling biliary tube     The patient denies any significant changes since last f/u. She is here today with her son. She continues to do well on her current medication regimen. Patient reports that she had her biliary tube replaced two days ago. She routinely has this done every 3 months. We discussed her current condition and medications in detail today. She tolerates medications without side effects. Ivan Quinteros reports no change in sleep or constipation. Current medication management consists of: Fentanyl 50 µg patch every 72 hours and morphine IR 30 mg 3 times daily as needed. Medications are helping with pain control and quality of life. His/Her pain is 5/10 with medication and 10/10 without. Pt describes pain as aching, stabbing, and burning. Aggravating factors include standing, sitting, and walking. Relieved with rest, medication, and avoiding painful activities. The patient reports % relief with current medications. Current treatment is helping to improve general activity, mood, walking, sleep, enjoyment of life    Measuring clinical outcomes of chronic pain patients: score 10/28; the lower the number the better the outcome. Pain Meds and Quality Of Life have been reviewed. Nonpharmacologic therapy and non-opioid pharmacologic therapy were considered. If opioid therapy is prescribed, this is only if the expected benefits are anticipated to outweigh risks. She  is otherwise doing well with no other complaints today. She denies any adverse events including nausea, vomiting, dizziness, increased constipation, hallucinations, or seizures. The patient reports functional improvement and QOL with pain medication. Vitals:    01/25/18 0821   BP: 147/67   Pulse: 83   Resp: 18   Temp: 98 °F (36.7 °C)   TempSrc: Oral   Weight: 68 kg (150 lb)   Height: 5' 6\" (1.676 m)   PainSc:   5   PainLoc: Leg         Allergies   Allergen Reactions    Aspirin Nausea Only       Past Surgical History:   Procedure Laterality Date    COLONOSCOPY  5/29/12    Dr. Guicho Gallegos, single polyp, 5 yr f/u    HX BUNIONECTOMY      LEFT    HX CARPAL TUNNEL RELEASE  2000    HX CHOLECYSTECTOMY  12/09    GALLSTONE PANCREATITIS    HX LUMBAR LAMINECTOMY  1994    HX TONSIL AND ADENOIDECTOMY         ROS   Constitutional: Negative for chills, diaphoresis, fever, malaise/fatigue and weight loss. HENT: Negative for congestion, ear pain, hearing loss, nosebleeds and sore throat. Gets wax buildup cleaned routinely   Eyes: Negative for blurred vision, double vision, pain and discharge. Dry eyes    Respiratory: Negative for cough, shortness of breath and wheezing. Cardiovascular: Negative for chest pain, palpitations and leg swelling. Gastrointestinal: Positive for constipation. Negative for diarrhea, heartburn, nausea and vomiting. Genitourinary: Negative for dysuria, frequency and urgency. Musculoskeletal: Positive for back pain and joint pain. Negative for falls, myalgias and neck pain. Skin: Negative for itching and rash. Neurological: Negative for dizziness, tingling, tremors, seizures, loss of consciousness, weakness and headaches. Psychiatric/Behavioral: Negative for depression and suicidal ideas. The patient is not nervous/anxious.            Physical Exam   Constitutional: She is oriented to person, place, and time and well-developed, well-nourished, and in no distress. She appears healthy. No distress. HENT:   Head: Normocephalic and atraumatic. Nose: Nose normal.   Eyes: Right eye exhibits no discharge. Left eye exhibits no discharge. Neck: Neck supple. Pulmonary/Chest: Effort normal.   Musculoskeletal: She exhibits tenderness. Right hip: She exhibits tenderness. Left hip: She exhibits tenderness. Right ankle: She exhibits swelling. Left ankle: She exhibits swelling. Lumbar back: She exhibits tenderness. Right lower leg: She exhibits tenderness. Left lower leg: She exhibits tenderness. Neurological: She is alert and oriented to person, place, and time. Skin: Skin is warm and dry. She is not diaphoretic. Psychiatric: Mood and affect normal.   Nursing note and vitals reviewed.       ASSESSMENT:    1. DDD (degenerative disc disease), lumbar    2. Degeneration of lumbar or lumbosacral intervertebral disc    3. Spondylolisthesis of lumbar region    4. Postlaminectomy syndrome, lumbar region    5. Polyarthralgia    6. Osteoarthritis of multiple joints, unspecified osteoarthritis type    7. Lumbar post-laminectomy syndrome    8. Sacroiliitis (HCC)    9. Spasm of muscle    10. Encounter for long-term (current) use of high-risk medication          [place] Prescription Monitoring Program was reviewed which does not demonstrate aberrancies and/or inconsistencies with regard to the historical prescribing of controlled medications to this patient by other providers. Medications were brought to visit today. Pill count was appropriate. When possible, non-drug therapy for chronic pain should be used as a first-line treatment. Physical therapy exercise regimens, chiropractic manipulation, meditation relaxation techniques, cognitive behavior therapy, acupuncture, yoga, Escobar Chi,  transcutaneous electrical nerve stimulation (TENS), and application of moist heat can help alleviate pain .      Explained that realistic expectations and goals with chronic pain management are to maximize function and minimize pain with the understanding that limitations will exist both in the extent of relief that she may achieve, as well as thresholds of mg strengths that we will not exceed. Our role is to help the patient better cope with chronic pain utilizng a multimodal approach. The patients condition and plan were discussed. All questions were answered. The patient agrees with the plan. PLAN / Pt Instructions:   1. Continue current plan with no evidence of addiction or diversion. 2. Stable on current medication without adverse events. 3. Refill fentanyl 50 µg/hr patch every 72 hours. 4. Refill morphine IR 30 mg tab, take half to 1 tablet up to 3 times daily as needed. 5. Discussed risks of addiction, dependency, and opioid induced hyperalgesia. 6. Return to clinic in 3 months         Prescription monitoring program reviewed. Pain medications prescribed with the objective of pain relief and improved physical and psychosocial function in this patient. DISPOSITION  · Counseled patient on proper use of prescribed medications. · Counseled patient about chronic medical conditions and their relationship to anxiety and depression and recommended mental health support as needed. · Reviewed with patient self-help tools, home exercise, and lifestyle changes to assist the patient in self-management of symptoms. · Reviewed with patient the treatment plan, goals of treatment plan, and limitations of treatment plan, to include the potential for side effects from medications and procedures. If side effects occur, it is the responsibility of the patient to inform the clinic so that a change in the treatment plan can be made in a safe manner. The patient is advised that stopping prescribed medication may cause an increase in symptoms and possible medication withdrawal symptoms. The patient is informed an emergency room evaluation may be necessary if this occurs.         Spent 25 minutes with patient today reviewing the treatment plan, goals of treatment plan, and limitations of the treatment plan, to include the potential for side effects from medications and procedures. More than 50% of the visit time was spent counseling the patient. Yudy Saravia PA-C 1/25/2018        Note: Please excuse any typographical errors. Voice recognition software was used for this note and may cause mistakes.

## 2018-04-12 ENCOUNTER — OFFICE VISIT (OUTPATIENT)
Dept: INTERNAL MEDICINE CLINIC | Age: 83
End: 2018-04-12

## 2018-04-12 VITALS
DIASTOLIC BLOOD PRESSURE: 57 MMHG | OXYGEN SATURATION: 96 % | WEIGHT: 145 LBS | TEMPERATURE: 99.5 F | HEIGHT: 63 IN | BODY MASS INDEX: 25.69 KG/M2 | SYSTOLIC BLOOD PRESSURE: 115 MMHG | HEART RATE: 92 BPM | RESPIRATION RATE: 17 BRPM

## 2018-04-12 DIAGNOSIS — I10 ESSENTIAL HYPERTENSION: Chronic | ICD-10-CM

## 2018-04-12 DIAGNOSIS — N18.30 CHRONIC RENAL IMPAIRMENT, STAGE 3 (MODERATE) (HCC): Chronic | ICD-10-CM

## 2018-04-12 DIAGNOSIS — Z00.00 MEDICARE ANNUAL WELLNESS VISIT, SUBSEQUENT: Primary | ICD-10-CM

## 2018-04-12 DIAGNOSIS — E11.9 TYPE 2 DIABETES MELLITUS WITHOUT COMPLICATION, WITHOUT LONG-TERM CURRENT USE OF INSULIN (HCC): Chronic | ICD-10-CM

## 2018-04-12 DIAGNOSIS — E78.00 HYPERCHOLESTEREMIA: Chronic | ICD-10-CM

## 2018-04-12 LAB
GLUCOSE POC: 136 MG/DL
HBA1C MFR BLD HPLC: 5.7 %

## 2018-04-12 NOTE — MR AVS SNAPSHOT
Gustavodo Fredrick 
 
 
 [address] [facility] [address] [id number] 
[phone] Patient: Shine Liner MRN: [id number] Visit Information Date & Time Provider Department Dept. Phone Encounter #  
 2018 11:00 AM Sabrina Barbour MD [facility] [phone] [id number] Follow-up Instructions Return in about 4 months (around 2018) for HTN, DM, cholesterol. Your Appointments 2018 11:00 AM  
Follow Up with Shayan Ventura PA-C [address] [place] [address] for Pain Management (LEV SCHEDULING) Appt Note: return in 3 months [address] [address]  
[phone] [facility] [phone] Upcoming Health Maintenance Date Due  
 LIPID PANEL Q1 3/7/2018 MEDICARE YEARLY EXAM 3/21/2018 MICROALBUMIN Q1 2018* ZOSTER VACCINE AGE 60> 2019* HEMOGLOBIN A1C Q6M 2018 EYE EXAM RETINAL OR DILATED Q1 2019 GLAUCOMA SCREENING Q2Y 2020 DTaP/Tdap/Td series (2 - Td) 2022 *Topic was postponed. The date shown is not the original due date. Allergies as of 2018  Review Complete On: 2018 By: Sabrina Barbour MD  
  
 Severity Noted Reaction Type Reactions Aspirin    Nausea Only, Unknown (comments) Other reaction(s): gi distress Current Immunizations  Reviewed on 10/6/2015 Name Date Influenza High Dose Vaccine PF 2017, 10/19/2016, 10/6/2015 12:25 PM  
 Influenza Vaccine 10/15/2017 12:00 AM, 10/4/2016 12:00 AM, 2014 Influenza Vaccine Split 10/25/2012  1:24 PM  
 Pneumococcal Conjugate (PCV-13) 10/19/2016  3:50 PM  
 Pneumococcal Polysaccharide (PPSV-23) 2016 12:00 AM  
 TDAP Vaccine 2012 ZZZ-RETIRED (DO NOT USE) Pneumococcal Vaccine (Unspecified Type) 2009 Not reviewed this visit You Were Diagnosed With   
  
 Codes Comments Medicare annual wellness visit, subsequent    -  Primary ICD-10-CM: Z00.00 ICD-9-CM: V70.0 Type 2 diabetes mellitus without complication, without long-term current use of insulin (HCC)     ICD-10-CM: E11.9 ICD-9-CM: 250.00 Essential hypertension     ICD-10-CM: I10 
ICD-9-CM: 401.9 Hypercholesteremia     ICD-10-CM: E78.00 ICD-9-CM: 272.0 Chronic renal impairment, stage 3 (moderate)     ICD-10-CM: N18.3 ICD-9-CM: 616. 3 Vitals BP Pulse Temp Resp Height(growth percentile) Weight(growth percentile) 115/57 92 99.5 °F (37.5 °C) (Oral) 17 5' 3\" (1.6 m) 145 lb (65.8 kg) SpO2 BMI OB Status Smoking Status 96% 25.69 kg/m2 Postmenopausal Never Smoker Vitals History BMI and BSA Data Body Mass Index Body Surface Area  
 25.69 kg/m 2 1.71 m 2 Preferred Pharmacy Pharmacy Name Phone [facility] [address], [address] [address] [phone] Your Updated Medication List  
  
   
This list is accurate as of 4/12/18 11:52 AM.  Always use your most recent med list. amLODIPine 10 mg tablet Commonly known as:  Annelise Cordial TAKE 1 TABLET BY MOUTH EVERY DAY Blood-Glucose Meter monitoring kit Commonly known as:  Catalina Thurman Use to test blood sugar daily or as directed  
  
 cromolyn 4 % ophthalmic solution Commonly known as:  [facility] Admin[facility] 1 Drop to both eyes. diclofenac 3 % topical gel Commonly known as:  Leah January Apply  to affected area two (2) times daily as needed for Pain.  
  
 ergocalciferol 50,000 unit capsule Commonly known as:  ERGOCALCIFEROL  
TAKE 1 CAPSULE BY MOUTH EVERY 7 DAYS  
  
 * fentaNYL 50 mcg/hr PATCH Commonly known as:  DURAGESIC  
1 Patch by TransDERmal route every seventy-two (72) hours. Max Daily Amount: 1 Patch. For chronic pain * fentaNYL 50 mcg/hr PATCH Commonly known as:  Sundar Garrett  
 1 Patch by TransDERmal route every seventy-two (72) hours. Max Daily Amount: 1 Patch. For chronic pain * fentaNYL 50 mcg/hr PATCH Commonly known as:  DURAGESIC  
1 Patch by TransDERmal route every seventy-two (72) hours. Max Daily Amount: 1 Patch. For chronic pain * fentaNYL 50 mcg/hr PATCH Commonly known as:  DURAGESIC  
1 Patch by TransDERmal route every seventy-two (72) hours. Max Daily Amount: 1 Patch. For chronic pain  
  
 furosemide 40 mg tablet Commonly known as:  LASIX Take 1 Tab by mouth daily. HYDROcodone-acetaminophen 5-325 mg per tablet Commonly known as:  Gail Batman Take 1 Tab by Mouth Every 4 Hours As Needed for Pain. JANUVIA 50 mg tablet Generic drug:  SITagliptin TAKE 1 TABLET BY MOUTH EVERY DAY Lancets Misc Commonly known as:  ONETOUCH ULTRASOFT LANCETS Use to test blood sugar daily or as directed  
  
 lidocaine 5 % ointment Commonly known as:  XYLOCAINE  
USE DAILY AS NEEDED FOR DRESSING CHANGES  
  
 morphine IR 30 mg tablet Commonly known as:  MS IR Take 0.5-1 Tabs by mouth three (3) times daily as needed for Pain (rescue) for up to 30 days. Max Daily Amount: 90 mg.  
  
 mupirocin calcium 2 % topical cream  
Commonly known as:  [employer] Apply  to affected area two (2) times a day.  
  
 naloxone 4 mg/actuation nasal spray Commonly known as:  NARCAN  
4 mg by Nasal route as needed. omeprazole 40 mg capsule Commonly known as:  PRILOSEC  
TAKE ONE CAPSULE BY MOUTH DAILY  
  
 ONETOUCH ULTRA TEST strip Generic drug:  glucose blood VI test strips TEST BLOOD SUGAR DAILY OR AS DIRECTED  
  
 potassium chloride 20 mEq tablet Commonly known as:  K-DUR, KLOR-CON  
TAKE 1 TABLET BY MOUTH TWICE DAILY  
  
 rivastigmine 4.6 mg/24 hr patch Commonly known as:  EXELON  
APPLY 1 PATCH EXTERNALLY TO THE SKIN DAILY  
  
 tolterodine ER 4 mg ER capsule Commonly known as:  DETROL LA  
TAKE ONE CAPSULE BY MOUTH DAILY ursodiol 300 mg capsule Commonly known as:  ACTIGALL TAKE 1 CAPSULE BY MOUTH TWICE DAILY  
  
 valsartan-hydroCHLOROthiazide 320-25 mg per tablet Commonly known as:  DIOVAN-HCT  
TAKE 1 TABLET BY MOUTH DAILY * Notice: This list has 4 medication(s) that are the same as other medications prescribed for you. Read the directions carefully, and ask your doctor or other care provider to review them with you. We Performed the Following AMB POC GLUCOSE BLOOD, BY GLUCOSE MONITORING DEVICE [39882 CPT(R)] AMB POC HEMOGLOBIN A1C [56958 CPT(R)] Follow-up Instructions Return in about 4 months (around 8/12/2018) for HTN, DM, cholesterol. Patient Instructions Medicare Wellness Visit, Female The best way to live healthy is to have a healthy lifestyle by eating a well-balanced diet, exercising regularly, limiting alcohol and stopping smoking. Regular physical exams and screening tests are another way to keep healthy. Preventive exams provided by your health care provider can find health problems before they become diseases or illnesses. Preventive services including immunizations, screening tests, monitoring and exams can help you take care of your own health. All people over age 72 should have a pneumovax  and and a prevnar shot to prevent pneumonia. These are once in a lifetime unless you and your provider decide differently. All people over 65 should have a yearly flu shot and a tetanus vaccine every 10 years. A bone mass density to screen for osteoporosis or thinning of the bones should be done every 2 years after 65. Screening for diabetes mellitus with a blood sugar test should be done every year.  
 
Glaucoma is a disease of the eye due to increased ocular pressure that can lead to blindness and it should be done every year by an eye professional. 
 
Cardiovascular screening tests that check for elevated lipids (fatty part of blood) which can lead to heart disease and strokes should be done every 5 years. Colorectal screening that evaluates for blood or polyps in your colon should be done yearly as a stool test or every five years as a flexible sigmoidoscope or every 10 years as a colonoscopy up to age 76. Breast cancer screening with a mammogram is recommended biennially  for women age 54-69. Screening for cervical cancer with a pap smear and pelvic exam is recommended for women after age 72 years every 2 years up to age 79 or when the provider and patient decide to stop. If there is a history of cervical abnormalities or other increased risk for cancer then the test is recommended yearly. Hepatitis C screening is also recommended for anyone born between [birth year] through [address]. A shingles vaccine is also recommended once in a lifetime after age 61. Your Medicare Wellness Exam is recommended annually. Here is a list of your current Health Maintenance items with a due date: 
Health Maintenance Due Topic Date Due  Cholesterol Test   03/07/2018 Benjamín Prior Annual Well Visit  03/21/2018 Introducing [facility]! Roberto Calabrese introduces Neverfail patient portal. Now you can access parts of your medical record, email your doctor's office, and request medication refills online. 1. In your internet browser, go to [email or web address] 2. Click on the First Time User? Click Here link in the Sign In box. You will see the New Member Sign Up page. 3. Enter your Neverfail Access Code exactly as it appears below. You will not need to use this code after youve completed the sign-up process. If you do not sign up before the expiration date, you must request a new code. · Neverfail Access Code: [email or web address] Expires: 4/25/2018 10:02 AM 
 
4. Enter the last four digits of your Social Security Number (xxxx) and Date of Birth (mm/dd/yyyy) as indicated and click Submit.  You will be taken to the next sign-up page. 5. Create a NephroPlus ID. This will be your NephroPlus login ID and cannot be changed, so think of one that is secure and easy to remember. 6. Create a NephroPlus password. You can change your password at any time. 7. Enter your Password Reset Question and Answer. This can be used at a later time if you forget your password. 8. Enter your e-mail address. You will receive e-mail notification when new information is available in [address] 9. Click Sign Up. You can now view and download portions of your medical record. 10. Click the Download Summary menu link to download a portable copy of your medical information. If you have questions, please visit the Frequently Asked Questions section of the NephroPlus website. Remember, NephroPlus is NOT to be used for urgent needs. For medical emergencies, dial 911. Now available from your iPhone and Android! Please provide this summary of care documentation to your next provider. Your primary care clinician is listed as [facility]. If you have any questions after today's visit, please call [phone].

## 2018-04-12 NOTE — PROGRESS NOTES
This is the Subsequent Medicare Annual Wellness Exam, performed 12 months or more after the Initial AWV or the last Subsequent AWV    I have reviewed the patient's medical history in detail and updated the computerized patient record.      History     Past Medical History:   Diagnosis Date    Abdominal pain, other specified site 10/11    with abnml LFT, no etiology found    Abnormal LFTs     Anemia     Bile duct stone 11/13    Dr. Manuel Perez of great toe of right foot 3/1/2013    Cholangitis 4/14/16    Chondromalacia of right shoulder 11/6/2014    Colon polyps     CRI (chronic renal insufficiency)     CVA (cerebral infarction)     LACUNAR    DDD (degenerative disc disease), lumbar 11/6/2014    Dementia     Diabetes ([address]     DJD (degenerative joint disease) of lumbar spine 1994    DJD (degenerative joint disease), multiple sites 11/6/2014    Gallstone pancreatitis     stent placed, June 2012    GERD (gastroesophageal reflux disease)     History of bone density study 1/07    wnl    Hypercholesterolemia     Hypertension     Lumbar arthropathy ([address] 11/6/2014    Lumbar nerve root impingement 11/6/2014    Lumbar post-laminectomy syndrome 11/6/2014    Pancolonic diverticulosis     Polyarthralgia 11/6/2014    Post laminectomy syndrome 2002    S/P lumbar spinal fusion 11/6/2014    Sacroiliitis ([address] 11/6/2014    Spondylolisthesis of lumbar region 11/6/2014    Subscapularis tendinitis of right shoulder 11/6/2014      Past Surgical History:   Procedure Laterality Date    COLONOSCOPY  5/29/12    Dr. Matt Hernandez, single polyp, 5 yr f/u    HX BUNIONECTOMY      LEFT    HX CARPAL TUNNEL RELEASE  2000    HX CHOLECYSTECTOMY  12/09    GALLSTONE PANCREATITIS    HX LUMBAR LAMINECTOMY  1994    HX TONSIL AND ADENOIDECTOMY       Current Outpatient Prescriptions   Medication Sig Dispense Refill    amLODIPine (NORVASC) 10 mg tablet TAKE 1 TABLET BY MOUTH EVERY DAY 30 Tab 5    tolterodine ER (DETROL LA) 4 mg ER capsule TAKE ONE CAPSULE BY MOUTH DAILY 30 Cap 5    omeprazole (PRILOSEC) 40 mg capsule TAKE ONE CAPSULE BY MOUTH DAILY 30 Cap 5    JANUVIA 50 mg tablet TAKE 1 TABLET BY MOUTH EVERY DAY 30 Tab 5    ergocalciferol (ERGOCALCIFEROL) 50,000 unit capsule TAKE 1 CAPSULE BY MOUTH EVERY 7 DAYS 4 Cap 5    potassium chloride (K-DUR, KLOR-CON) 20 mEq tablet TAKE 1 TABLET BY MOUTH TWICE DAILY 60 Tab 5    valsartan-hydroCHLOROthiazide (DIOVAN-HCT) 320-25 mg per tablet TAKE 1 TABLET BY MOUTH DAILY 30 Tab 5    morphine IR (MS IR) 30 mg tablet Take 0.5-1 Tabs by mouth three (3) times daily as needed for Pain (rescue) for up to 30 days. Max Daily Amount: 90 mg. 90 Tab 0    fentaNYL (DURAGESIC) 50 mcg/hr PATCH 1 Patch by TransDERmal route every seventy-two (72) hours. Max Daily Amount: 1 Patch. For chronic pain 10 Patch 0    fentaNYL (DURAGESIC) 50 mcg/hr PATCH 1 Patch by TransDERmal route every seventy-two (72) hours. Max Daily Amount: 1 Patch. For chronic pain 10 Patch 0    fentaNYL (DURAGESIC) 50 mcg/hr PATCH 1 Patch by TransDERmal route every seventy-two (72) hours. Max Daily Amount: 1 Patch. For chronic pain 10 Patch 0    fentaNYL (DURAGESIC) 50 mcg/hr PATCH 1 Patch by TransDERmal route every seventy-two (72) hours. Max Daily Amount: 1 Patch. For chronic pain 10 Patch 0    rivastigmine (EXELON) 4.6 mg/24 hr patch APPLY 1 PATCH EXTERNALLY TO THE SKIN DAILY 30 Patch 0    furosemide (LASIX) 40 mg tablet Take 1 Tab by mouth daily. 30 Tab 11    ONETOUCH ULTRA TEST strip TEST BLOOD SUGAR DAILY OR AS DIRECTED 100 Strip 4    ursodiol (ACTIGALL) 300 mg capsule TAKE 1 CAPSULE BY MOUTH TWICE DAILY 60 Cap 5    cromolyn (OPTICROM) 4 % ophthalmic solution Administer 1 Drop to both eyes. 2    mupirocin calcium (BACTROBAN) 2 % topical cream Apply  to affected area two (2) times a day.  15 g 5    Lancets (ONE TOUCH ULTRASOFT LANCETS) Misc Use to test blood sugar daily or as directed 1 Package 11    lidocaine (XYLOCAINE) 5 % ointment USE DAILY AS NEEDED FOR DRESSING CHANGES 50 g 5    naloxone 4 mg/actuation spry 4 mg by Nasal route as needed. 1 Bottle 1    HYDROcodone-acetaminophen (NORCO) 5-325 mg per tablet Take 1 Tab by Mouth Every 4 Hours As Needed for Pain.  diclofenac sodium (SOLARAZE) 3 % topical gel Apply  to affected area two (2) times daily as needed for Pain.  50 g 5    Blood-Glucose Meter (ONE TOUCH ULTRA 2) monitoring kit Use to test blood sugar daily or as directed 1 Kit 0     Allergies   Allergen Reactions    Aspirin Nausea Only and Unknown (comments)     Other reaction(s): gi distress     Family History   Problem Relation Age of Onset    Hypertension Mother     Hypertension Father     Stroke Father      Social History   Substance Use Topics    Smoking status: Never Smoker    Smokeless tobacco: Never Used    Alcohol use No     Patient Active Problem List   Diagnosis Code    Lumbago M54.5    Postlaminectomy syndrome, lumbar region M96.1    Degeneration of lumbar or lumbosacral intervertebral disc M51.37    Lumbosacral spondylosis without myelopathy M47.817    Thoracic or lumbosacral neuritis or radiculitis, unspecified [id number]    Spasm of muscle M62.838    Pain in limb M79.609    Disturbance of skin sensation R20.9    Diabetes mellitus (HCC) E11.9    HTN (hypertension) I10    Hypercholesteremia E78.00    GERD (gastroesophageal reflux disease) K21.9    CRI (chronic renal insufficiency) N18.9    Dementia F03.90    Gallstones K80.20    Bunion of great toe of right foot M21.611    DDD (degenerative disc disease), lumbar M51.36    Lumbar arthropathy (HCC) M46.96    Spondylolisthesis of lumbar region M43.16    Lumbar post-laminectomy syndrome M96.1    S/P lumbar spinal fusion Z98.1    Lumbar nerve root impingement M54.16    Sacroiliitis (HCC) M46.1    Polyarthralgia M25.50    DJD (degenerative joint disease), multiple sites M15.9    Subscapularis tendinitis of right shoulder M75.81    Chondromalacia of right shoulder M94.211    Shoulder pain M25.519    Chronic low back pain M54.5, G89.29       Depression Risk Factor Screening:     PHQ over the last two weeks 1/25/2018   Little interest or pleasure in doing things Not at all   Feeling down, depressed or hopeless Not at all   Total Score PHQ 2 0     Alcohol Risk Factor Screening: You do not drink alcohol or very rarely. Functional Ability and Level of Safety:   Hearing Loss  Hearing is good. Activities of Daily Living  The home contains: grab bars  Patient needs help with:  phone, transportation, shopping, preparing meals, laundry, housework, managing medications, managing money, bathing and walking    Fall Risk  Fall Risk Assessment, last 12 mths 1/25/2018   Able to walk? Yes   Fall in past 12 months? No       Abuse Screen  Patient is not abused    Cognitive Screening   Evaluation of Cognitive Function:  Has your family/caregiver stated any concerns about your memory: yes  Abnormal--known dementia    Patient Care Team   Patient Care Team:  Bishop Svetlana MD as PCP - General (Internal Medicine)  Farnaz Horne MD (Physical Medicine and Rehab)  Swetha Henry MD as Consulting Provider (Ophthalmology)    Assessment/Plan   Education and counseling provided:  Are appropriate based on today's review and evaluation    Diagnoses and all orders for this visit:    1.  Medicare annual wellness visit, subsequent        Health Maintenance Due   Topic Date Due    LIPID PANEL Q1  03/07/2018    MEDICARE YEARLY EXAM  03/21/2018

## 2018-04-12 NOTE — PROGRESS NOTES
HISTORY OF PRESENT ILLNESS  Enedina Munson is a 80 y.o. female. Visit Vitals    /57    Pulse 92    Temp 99.5 °F (37.5 °C) (Oral)    Resp 17    Ht 5' 3\" (1.6 m)    Wt 145 lb (65.8 kg)    SpO2 96%    BMI 25.69 kg/m2       HPI Comments: Had recent biliary change. Had CBC and BMP for this at [facility]    Hypertension    The history is provided by the patient. This is a chronic problem. The current episode started more than 1 week ago. The problem has not changed since onset. There are no associated agents to hypertension. Risk factors include obesity and diabetes mellitus. Diabetes   The history is provided by the patient. This is a chronic problem. The current episode started more than 1 week ago. The problem occurs daily. The problem has not changed since onset. Exacerbated by: diet. The symptoms are relieved by medications (diet). Review of Systems   Constitutional: Negative. Per family and pt (suspect due to dementia) she is doing well. HENT: Negative for sore throat. Respiratory: Negative. Cardiovascular: Negative. Gastrointestinal: Negative. Physical Exam   Constitutional: She appears well-developed and well-nourished. No distress. Cardiovascular: Normal rate and regular rhythm. Pulmonary/Chest: Effort normal and breath sounds normal.   Neurological: She is alert. Skin: Skin is warm and dry. She is not diaphoretic. Psychiatric: She has a normal mood and affect. Nursing note and vitals reviewed. ASSESSMENT and PLAN    ICD-10-CM ICD-9-CM           2. Type 2 diabetes mellitus without complication, without long-term current use of insulin ([facility]) E11.9 250.00 AMB POC GLUCOSE BLOOD, BY GLUCOSE MONITORING DEVICE      AMB POC HEMOGLOBIN A1C   3. Essential hypertension I10 401.9    4. Hypercholesteremia E78.00 272.0    5. Chronic renal impairment, stage 3 (moderate) N18.3 585.3      Doing well today    BP controlled    DM has been controlled.  Update HBA!c    reviewed recent lab at [facility] from March    F/u 4-5 months

## 2018-04-12 NOTE — PROGRESS NOTES
Identified pt with two pt identifiers(name and ). Reviewed record in preparation for visit and have obtained necessary documentation. Chief Complaint   Patient presents with    Hypertension     (Rm #6) 4mo f/u    Diabetes    Annual Wellness Visit        Health Maintenance Due   Topic    LIPID PANEL Q1     MEDICARE YEARLY EXAM        Coordination of Care Questionnaire:  :   1) Have you been to an emergency room, urgent care clinic since your last visit? no   Hospitalized since your last visit? no             2. Have seen or consulted any other health care provider since your last visit? YES  If yes, where when, and reason for visit? 3/30/18: Dr. Guillermo Estrella- @ [facility]-  biliary tube changed    3) Do you have an Advanced Directive/ Living Will in place? NO  If yes, do we have a copy on file NO  If no, would you like information NO    Patient is accompanied by son I have received verbal consent from Jodie Gottron to discuss any/all medical information while they are present in the room.         Results for orders placed or performed in visit on 18   AMB POC GLUCOSE BLOOD, BY GLUCOSE MONITORING DEVICE   Result Value Ref Range    Glucose  mg/dL   AMB POC HEMOGLOBIN A1C   Result Value Ref Range    Hemoglobin A1c (POC) 5.7 %

## 2018-04-12 NOTE — PATIENT INSTRUCTIONS

## 2018-04-30 ENCOUNTER — OFFICE VISIT (OUTPATIENT)
Dept: PAIN MANAGEMENT | Age: 83
End: 2018-04-30

## 2018-04-30 VITALS
DIASTOLIC BLOOD PRESSURE: 87 MMHG | SYSTOLIC BLOOD PRESSURE: 139 MMHG | WEIGHT: 145 LBS | TEMPERATURE: 98.6 F | HEART RATE: 73 BPM | HEIGHT: 63 IN | RESPIRATION RATE: 18 BRPM | BODY MASS INDEX: 25.69 KG/M2

## 2018-04-30 DIAGNOSIS — M62.838 SPASM OF MUSCLE: ICD-10-CM

## 2018-04-30 DIAGNOSIS — M96.1 LUMBAR POST-LAMINECTOMY SYNDROME: ICD-10-CM

## 2018-04-30 DIAGNOSIS — M47.816 LUMBAR ARTHROPATHY: ICD-10-CM

## 2018-04-30 DIAGNOSIS — M25.50 POLYARTHRALGIA: ICD-10-CM

## 2018-04-30 DIAGNOSIS — Z79.899 ENCOUNTER FOR LONG-TERM (CURRENT) USE OF HIGH-RISK MEDICATION: Primary | ICD-10-CM

## 2018-04-30 DIAGNOSIS — M43.16 SPONDYLOLISTHESIS OF LUMBAR REGION: ICD-10-CM

## 2018-04-30 DIAGNOSIS — M96.1 POSTLAMINECTOMY SYNDROME, LUMBAR REGION: ICD-10-CM

## 2018-04-30 DIAGNOSIS — M51.36 DDD (DEGENERATIVE DISC DISEASE), LUMBAR: ICD-10-CM

## 2018-04-30 DIAGNOSIS — M51.37 DEGENERATION OF LUMBAR OR LUMBOSACRAL INTERVERTEBRAL DISC: ICD-10-CM

## 2018-04-30 DIAGNOSIS — M15.9 OSTEOARTHRITIS OF MULTIPLE JOINTS, UNSPECIFIED OSTEOARTHRITIS TYPE: ICD-10-CM

## 2018-04-30 DIAGNOSIS — M46.1 SACROILIITIS (HCC): ICD-10-CM

## 2018-04-30 RX ORDER — FENTANYL 50 UG/1
1 PATCH TRANSDERMAL
Qty: 10 PATCH | Refills: 0 | Status: SHIPPED | OUTPATIENT
Start: 2018-05-12 | End: 2018-06-11

## 2018-04-30 RX ORDER — MORPHINE SULFATE 30 MG/1
15-30 TABLET ORAL
Qty: 90 TAB | Refills: 0 | Status: SHIPPED | OUTPATIENT
Start: 2018-05-12 | End: 2018-07-05 | Stop reason: SDUPTHER

## 2018-04-30 NOTE — PROGRESS NOTES
Nursing Notes    Patient presents to the office today in follow-up. Patient rates her pain at 7/10 on the numerical pain scale. Reviewed medications with counts as follows:    Rx Date filled Qty Dispensed Pill Count Last Dose Short   Fentanyl 50 mcg+1 prescription 04/13/18 10 8+1on today no   Morphine 30 mg 04/13/18 90 54 This a.m no                                POC UDS was performed in office today    Any new labs or imaging since last appointment? NO    Have you been to an emergency room (ER) or urgent care clinic since your last visit? NO            Have you been hospitalized since your last visit? NO     If yes, where, when, and reason for visit? Have you seen or consulted any other health care providers outside of the [address] [place] [address]  since your last visit? YES, PCP     If yes, where, when, and reason for visit? Ms. Belinda Wright has a reminder for a \"due or due soon\" health maintenance. I have asked that she contact her primary care provider for follow-up on this health maintenance.

## 2018-04-30 NOTE — PROGRESS NOTES
HISTORY OF PRESENT ILLNESS  Patti Mitchell is a 80 y.o. female    Ms. Kemp returns today for f/u of chronic low back pain and multiple joint pain. She has 2 Prior lumbar surgeries. She has had lumbar epidural injections in the past with temporary improvement. PT in the past with some improvement. PPMHx: History of cholangitis with chronic indwelling biliary tube    The patient denies any significant changes since last f/u. Patient reports that she had an appointment with her PCP on 12 April. We discussed her current condition and medications in detail today. She tolerates medications without side effects. Patti Mitchell reports no change in sleep or constipation. Patient understands current practice transition and taper plan in future. Patient is planning on transferring her continued pain management care to another practice. She will sign a medical release form today. I will provide 1 month transition prescription. We will assist patient with any medical records transfer as needed. Current MME dose as of today:21    Current medication management consists of:morphine IR 30 mg tablet, take 1/2-1 tablet 3 times daily as needed, fentanyl 50 mcg patch, 1 patch every 72 hours  Medications are helping with pain control and quality of life. Her pain is 3/10 with medication and 9/10 without. Pt describes pain as aching, stabbing, and burning. Aggravating factors include standing, sitting, and walking. Relieved with rest, medication, and avoiding painful activities. The patient reports 70% relief with current medications. Current treatment is helping to improve general activity, mood, walking, sleep, enjoyment of life    Pain Meds and Quality Of Life have been reviewed. Nonpharmacologic therapy and non-opioid pharmacologic therapy were considered. If opioid therapy is prescribed, this is only if the expected benefits are anticipated to outweigh risks.      She  is otherwise doing well with no other complaints today. She denies any adverse events including nausea, vomiting, dizziness, increased constipation, hallucinations, or seizures. The patient reports functional improvement and QOL with pain medication. Vitals:    04/30/18 1200   BP: 139/87   Pulse: 73   Resp: 18   Temp: 98.6 °F (37 °C)   TempSrc: Oral   Weight: 65.8 kg (145 lb)   Height: 5' 3\" (1.6 m)   PainSc:   7   PainLoc: Leg         Allergies   Allergen Reactions    Aspirin Nausea Only and Unknown (comments)     Other reaction(s): gi distress       Past Surgical History:   Procedure Laterality Date    COLONOSCOPY  5/29/12    Dr. Kvng Madera, single polyp, 5 yr f/u    HX BUNIONECTOMY      LEFT    HX CARPAL TUNNEL RELEASE  2000    HX CHOLECYSTECTOMY  12/09    GALLSTONE PANCREATITIS    HX LUMBAR LAMINECTOMY  1994    HX TONSIL AND ADENOIDECTOMY         ROS   Constitutional: Negative for chills, diaphoresis, fever, malaise/fatigue and weight loss. HENT: Negative for congestion, ear pain, hearing loss, nosebleeds and sore throat.         Gets wax buildup cleaned routinely   Eyes: Negative for blurred vision, double vision, pain and discharge.        Dry eyes    Respiratory: Negative for cough, shortness of breath and wheezing.    Cardiovascular: Negative for chest pain, palpitations and leg swelling. Gastrointestinal: Positive for constipation. Negative for diarrhea, heartburn, nausea and vomiting. Genitourinary: Negative for dysuria, frequency and urgency. Musculoskeletal: Positive for back pain and joint pain. Negative for falls, myalgias and neck pain. Skin: Negative for itching and rash. Neurological: Negative for dizziness, tingling, tremors, seizures, loss of consciousness, weakness and headaches. Psychiatric/Behavioral: Negative for depression and suicidal ideas.  The patient is not nervous/anxious.           Physical Exam   Constitutional: She is oriented to person, place, and time and well-developed, well-nourished, and in no distress. She appears healthy. No distress. HENT:   Head: Normocephalic and atraumatic. Nose: Nose normal.   Eyes: Right eye exhibits no discharge. Left eye exhibits no discharge. Neck: Neck supple. Pulmonary/Chest: Effort normal.   Musculoskeletal: She exhibits tenderness.        Right hip: She exhibits tenderness.        Left hip: She exhibits tenderness.        Right ankle: She exhibits swelling.        Left ankle: She exhibits swelling.        Lumbar back: She exhibits tenderness.        Right lower leg: She exhibits tenderness.        Left lower leg: She exhibits tenderness. Neurological: She is alert and oriented to person, place, and time. Skin: Skin is warm and dry. She is not diaphoretic. Psychiatric: Mood and affect normal.   Nursing note and vitals reviewed.       ASSESSMENT:    1. Encounter for long-term (current) use of high-risk medication    2. Osteoarthritis of multiple joints, unspecified osteoarthritis type    3. Polyarthralgia    4. Sacroiliitis ([address]    5. Lumbar post-laminectomy syndrome    6. Lumbar arthropathy (HCC)    7. Spasm of muscle    8. Postlaminectomy syndrome, lumbar region    9. DDD (degenerative disc disease), lumbar    10. Spondylolisthesis of lumbar region    11. Degeneration of lumbar or lumbosacral intervertebral disc          COMM:   0 [facility] Program was reviewed which does not demonstrate aberrancies and/or inconsistencies with regard to the historical prescribing of controlled medications to this patient by other providers. Medications were brought to visit today. Pill count was appropriate. When possible, non-drug therapy for chronic pain should be used as a first-line treatment.  Physical therapy exercise regimens, chiropractic manipulation, meditation relaxation techniques, cognitive behavior therapy, acupuncture, yoga, Esc[place] Chi,  transcutaneous electrical nerve stimulation (TENS), and application of moist heat can help alleviate pain . Explained that realistic expectations and goals with chronic pain management are to maximize function and minimize pain with the understanding that limitations will exist both in the extent of relief that she may achieve, as well as thresholds of mg strengths that we will not exceed. Our role is to help the patient better cope with chronic pain utilizng a multimodal approach. The patients condition and plan were discussed. All questions were answered. The patient agrees with the plan. PLAN / Pt Instructions:  1. Continue current plan with no evidence of addiction or diversion. 2. Stable on current medication without adverse events. 3. Refill morphine IR 30 mg tablet, take 1/2-1 tablet 3 times daily as needed  4. Refill fentanyl 50 mcg patch, 1 patch every 72 hours  5. Discussed risks of addiction, dependency, and opioid induced hyperalgesia. 6. Reviewed with patient benefits of home exercise, and lifestyle changes to assist the patient in self-management of symptoms. 7. Patient plans on moving her pain management care to another provider. 8. She will tentatively schedule for 3 month follow-up. We will assist with medical records transfer as needed. Prescription monitoring program reviewed. The patient  should keep track of total Tylenol intake and make sure liver function tests have been checked with primary care physician. POC oral swab today. Pain medications prescribed with the objective of pain relief and improved physical and psychosocial function in this patient. DISPOSITION  · Counseled patient on proper use of prescribed medications. · Counseled patient about chronic medical conditions and their relationship to anxiety and depression and recommended mental health support as needed. · Reviewed with patient self-help tools, home exercise, and lifestyle changes to assist the patient in self-management of symptoms.   · Reviewed with patient the treatment plan, goals of treatment plan, and limitations of treatment plan, to include the potential for side effects from medications and procedures. If side effects occur, it is the responsibility of the patient to inform the clinic so that a change in the treatment plan can be made in a safe manner. The patient is advised that stopping prescribed medication may cause an increase in symptoms and possible medication withdrawal symptoms. The patient is informed an emergency room evaluation may be necessary if this occurs. Spent 25 minutes with patient today reviewing the treatment plan, goals of treatment plan, and limitations of the treatment plan, to include the potential for side effects from medications and procedures. More than 50% of the visit time was spent counseling the patient. Yeimi Medley PA-C 4/30/2018        Note: Please excuse any typographical errors. Voice recognition software was used for this note and may cause mistakes.

## 2018-04-30 NOTE — MR AVS SNAPSHOT
[address] [address] 
[phone] Patient: Delonte Garcia MRN: [id number] Visit Information Date & Time Provider Department Dept. Phone Encounter #  
 2018 11:00 AM [facility] [phone] Your Appointments 2018 11:00 AM  
Office Visit with Tamela Kim MD  
[employer] Alireza Lango) Appt Note: 4 month f/u HTN/DM chol  
 [address]  
  
   
 [address] [facility] Upcoming Health Maintenance Date Due  
 LIPID PANEL Q1 3/7/2018 MICROALBUMIN Q1 2018* ZOSTER VACCINE AGE 60> 2019* Influenza Age 5 to Adult 2018 HEMOGLOBIN A1C Q6M 10/12/2018 EYE EXAM RETINAL OR DILATED Q1 2019 MEDICARE YEARLY EXAM 2019 GLAUCOMA SCREENING Q2Y 2020 DTaP/Tdap/Td series (2 - Td) 2022 *Topic was postponed. The date shown is not the original due date. Allergies as of 2018  Review Complete On: 2018 By: Britney Bocanegra PA-C Severity Noted Reaction Type Reactions Aspirin    Nausea Only, Unknown (comments) Other reaction(s): gi distress Current Immunizations  Reviewed on 10/6/2015 Name Date Influenza High Dose Vaccine PF 2017, 10/19/2016, 10/6/2015 12:25 PM  
 Influenza Vaccine 10/15/2017 12:00 AM, 10/4/2016 12:00 AM, 2014 Influenza Vaccine Split 10/25/2012  1:24 PM  
 Pneumococcal Conjugate (PCV-13) 10/19/2016  3:50 PM  
 Pneumococcal Polysaccharide (PPSV-23) 2016 12:00 AM  
 TDAP Vaccine 2012 ZZZ-RETIRED (DO NOT USE) Pneumococcal Vaccine (Unspecified Type) 2009 Not reviewed this visit You Were Diagnosed With   
  
 Codes Comments  Encounter for long-term (current) use of high-risk medication    - Primary ICD-10-CM: R52.764 ICD-9-CM: V58.69 Osteoarthritis of multiple joints, unspecified osteoarthritis type     ICD-10-CM: M15.9 ICD-9-CM: 715.89 Polyarthralgia     ICD-10-CM: M25.50 ICD-9-CM: 719.49 Sacroiliitis ([address]     ICD-10-CM: M46.1 ICD-9-CM: 720.2 Lumbar post-laminectomy syndrome     ICD-10-CM: M96.1 ICD-9-CM: 722.83 Lumbar arthropathy (HCC)     ICD-10-CM: M46.96 
ICD-9-CM: 721.3 Spasm of muscle     ICD-10-CM: F12.869 ICD-9-CM: 728.85 Postlaminectomy syndrome, lumbar region     ICD-10-CM: M96.1 ICD-9-CM: 722.83 DDD (degenerative disc disease), lumbar     ICD-10-CM: M51.36 
ICD-9-CM: 722.52 Spondylolisthesis of lumbar region     ICD-10-CM: M43.16 
ICD-9-CM: 738.4 Degeneration of lumbar or lumbosacral intervertebral disc     ICD-10-CM: M51.37 
ICD-9-CM: 722.52 Vitals BP Pulse Temp Resp Height(growth percentile) Weight(growth percentile) 139/87 (BP 1 Location: Right arm, BP Patient Position: Sitting) 73 98.6 °F (37 °C) (Oral) 18 5' 3\" (1.6 m) 145 lb (65.8 kg) BMI OB Status Smoking Status 25.69 kg/m2 Postmenopausal Never Smoker BMI and BSA Data Body Mass Index Body Surface Area  
 25.69 kg/m 2 1.71 m 2 Preferred Pharmacy Pharmacy Name Phone [facility] [address] [place] [address], [address] [place] [address] [phone] Your Updated Medication List  
  
   
This list is accurate as of 4/30/18 12:50 PM.  Always use your most recent med list. amLODIPine 10 mg tablet Commonly known as:  Adonay Beth TAKE 1 TABLET BY MOUTH EVERY DAY Blood-Glucose Meter monitoring kit Commonly known as:  Eryn Arora Use to test blood sugar daily or as directed  
  
 cromolyn 4 % ophthalmic solution Commonly known as:  OPTICROM Administer 1 Drop to both eyes. diclofenac 3 % topical gel Commonly known as:  Natalie Ashland Apply  to affected area two (2) times daily as needed for Pain.  
  
 ergocalciferol 50,000 unit capsule Commonly known as:  ERGOCALCIFEROL  
TAKE 1 CAPSULE BY MOUTH EVERY 7 DAYS  
  
 * fentaNYL 50 mcg/hr PATCH Commonly known as:  DURAGESIC  
1 Patch by TransDERmal route every seventy-two (72) hours. Max Daily Amount: 1 Patch. For chronic pain * fentaNYL 50 mcg/hr PATCH Commonly known as:  DURAGESIC  
1 Patch by TransDERmal route every seventy-two (72) hours for 30 days. Max Daily Amount: 1 Patch. For chronic pain Start taking on:  5/12/2018  
  
 furosemide 40 mg tablet Commonly known as:  LASIX Take 1 Tab by mouth daily. HYDROcodone-acetaminophen 5-325 mg per tablet Commonly known as:  Sandee Iota Take 1 Tab by Mouth Every 4 Hours As Needed for Pain. JANUVIA 50 mg tablet Generic drug:  SITagliptin TAKE 1 TABLET BY MOUTH EVERY DAY Lancets Misc Commonly known as:  ONETOUCH ULTRASOFT LANCETS Use to test blood sugar daily or as directed  
  
 lidocaine 5 % ointment Commonly known as:  XYLOCAINE  
USE DAILY AS NEEDED FOR DRESSING CHANGES  
  
 morphine IR 30 mg tablet Commonly known as:  MS IR Take 0.5-1 Tabs by mouth three (3) times daily as needed for Pain (rescue) for up to 30 days. Max Daily Amount: 90 mg. Start taking on:  5/12/2018  
  
 mupirocin calcium 2 % topical cream  
Commonly known as:  [employer] Apply  to affected area two (2) times a day.  
  
 naloxone 4 mg/actuation nasal spray Commonly known as:  NARCAN  
4 mg by Nasal route as needed. omeprazole 40 mg capsule Commonly known as:  PRILOSEC  
TAKE ONE CAPSULE BY MOUTH DAILY  
  
 ONETOUCH ULTRA TEST strip Generic drug:  glucose blood VI test strips TEST BLOOD SUGAR DAILY OR AS DIRECTED  
  
 potassium chloride 20 mEq tablet Commonly known as:  K-DUR, KLOR-CON  
TAKE 1 TABLET BY MOUTH TWICE DAILY  
  
 rivastigmine 4.6 mg/24 hr patch Commonly known as:  EXELON  
 APPLY 1 PATCH TRANSDERMALLY EVERY DAY  
  
 tolterodine ER 4 mg ER capsule Commonly known as:  DETROL LA  
TAKE ONE CAPSULE BY MOUTH DAILY  
  
 ursodiol 300 mg capsule Commonly known as:  ACTIGALL TAKE 1 CAPSULE BY MOUTH TWICE DAILY  
  
 valsartan-hydroCHLOROthiazide 320-25 mg per tablet Commonly known as:  DIOVAN-HCT  
TAKE 1 TABLET BY MOUTH DAILY * Notice: This list has 2 medication(s) that are the same as other medications prescribed for you. Read the directions carefully, and ask your doctor or other care provider to review them with you. Prescriptions Printed Refills  
 morphine IR (MS IR) 30 mg tablet 0 Starting on: 2018 Sig: Take 0.5-1 Tabs by mouth three (3) times daily as needed for Pain (rescue) for up to 30 days. Max Daily Amount: 90 mg.  
 Class: Print Route: Oral  
 fentaNYL (DURAGESIC) 50 mcg/hr PATCH 0 Starting on: 2018 Si Patch by TransDERmal route every seventy-two (72) hours for 30 days. Max Daily Amount: 1 Patch. For chronic pain  
 Class: Print Route: TransDERmal  
  
We Performed the Following DRUG SCREEN [HCI21734 Custom] Introducing [facility] SERVICES! [employer] introduces [employer] patient portal. Now you can access parts of your medical record, email your doctor's office, and request medication refills online. 1. In your internet browser, go to [email or web address] 2. Click on the First Time User? Click Here link in the Sign In box. You will see the New Member Sign Up page. 3. Enter your [employer] Access Code exactly as it appears below. You will not need to use this code after youve completed the sign-up process. If you do not sign up before the expiration date, you must request a new code. · Poly[employer] Access Code: [email or web address] Expires: 2018 12:50 PM 
 
4.  Enter the last four digits of your Social Security Number (xxxx) and Date of Birth (mm/dd/yyyy) as indicated and click Submit. You will be taken to the next sign-up page. 5. Create a [employer] ID. This will be your [employer] login ID and cannot be changed, so think of one that is secure and easy to remember. 6. Create a [employer] password. You can change your password at any time. 7. Enter your Password Reset Question and Answer. This can be used at a later time if you forget your password. 8. Enter your e-mail address. You will receive e-mail notification when new information is available in [address] 9. Click Sign Up. You can now view and download portions of your medical record. 10. Click the Download Summary menu link to download a portable copy of your medical information. If you have questions, please visit the Frequently Asked Questions section of the [employer] website. Remember, [employer] is NOT to be used for urgent needs. For medical emergencies, dial 911. Now available from your iPhone and Android! Please provide this summary of care documentation to your next provider. Your primary care clinician is listed as [facility]. If you have any questions after today's visit, please call [phone].

## 2018-07-05 DIAGNOSIS — M47.816 LUMBAR ARTHROPATHY: ICD-10-CM

## 2018-07-05 DIAGNOSIS — M25.50 POLYARTHRALGIA: ICD-10-CM

## 2018-07-05 DIAGNOSIS — M43.16 SPONDYLOLISTHESIS OF LUMBAR REGION: ICD-10-CM

## 2018-07-05 DIAGNOSIS — M51.36 DDD (DEGENERATIVE DISC DISEASE), LUMBAR: ICD-10-CM

## 2018-07-05 DIAGNOSIS — M96.1 POSTLAMINECTOMY SYNDROME, LUMBAR REGION: ICD-10-CM

## 2018-07-05 DIAGNOSIS — M46.1 SACROILIITIS (HCC): ICD-10-CM

## 2018-07-05 DIAGNOSIS — M96.1 LUMBAR POST-LAMINECTOMY SYNDROME: ICD-10-CM

## 2018-07-05 DIAGNOSIS — M51.37 DEGENERATION OF LUMBAR OR LUMBOSACRAL INTERVERTEBRAL DISC: ICD-10-CM

## 2018-07-05 RX ORDER — MORPHINE SULFATE 30 MG/1
15-30 TABLET ORAL
Qty: 75 TAB | Refills: 0 | Status: SHIPPED | OUTPATIENT
Start: 2018-07-05 | End: 2018-08-04

## 2018-07-18 DIAGNOSIS — Z76.0 MEDICATION REFILL: ICD-10-CM

## 2018-07-18 RX ORDER — FUROSEMIDE 40 MG/1
40 TABLET ORAL DAILY
Qty: 30 TAB | Refills: 11 | Status: SHIPPED | OUTPATIENT
Start: 2018-07-18 | End: 2019-07-18 | Stop reason: SDUPTHER

## 2018-08-08 ENCOUNTER — OFFICE VISIT (OUTPATIENT)
Dept: PAIN MANAGEMENT | Age: 83
End: 2018-08-08

## 2018-08-08 VITALS
SYSTOLIC BLOOD PRESSURE: 114 MMHG | TEMPERATURE: 98.4 F | RESPIRATION RATE: 18 BRPM | WEIGHT: 145 LBS | HEIGHT: 63 IN | DIASTOLIC BLOOD PRESSURE: 57 MMHG | HEART RATE: 79 BPM | BODY MASS INDEX: 25.69 KG/M2

## 2018-08-08 DIAGNOSIS — G89.29 CHRONIC BILATERAL LOW BACK PAIN, WITH SCIATICA PRESENCE UNSPECIFIED: Primary | ICD-10-CM

## 2018-08-08 DIAGNOSIS — Z79.899 ENCOUNTER FOR LONG-TERM (CURRENT) USE OF HIGH-RISK MEDICATION: ICD-10-CM

## 2018-08-08 DIAGNOSIS — G89.4 CHRONIC PAIN SYNDROME: ICD-10-CM

## 2018-08-08 DIAGNOSIS — M54.5 CHRONIC BILATERAL LOW BACK PAIN, WITH SCIATICA PRESENCE UNSPECIFIED: Primary | ICD-10-CM

## 2018-08-08 RX ORDER — FENTANYL 37.5 UG/H
37.5 PATCH, EXTENDED RELEASE TRANSDERMAL
Qty: 10 PATCH | Refills: 0 | Status: SHIPPED | OUTPATIENT
Start: 2018-08-08 | End: 2018-09-07

## 2018-08-08 RX ORDER — NALOXONE HYDROCHLORIDE 4 MG/.1ML
SPRAY NASAL
Qty: 1 EACH | Refills: 0 | Status: SHIPPED | OUTPATIENT
Start: 2018-08-08

## 2018-08-08 RX ORDER — MORPHINE SULFATE 30 MG/1
30 TABLET ORAL
Qty: 75 TAB | Refills: 0 | Status: SHIPPED | OUTPATIENT
Start: 2018-08-08 | End: 2018-09-07

## 2018-08-08 NOTE — PROGRESS NOTES
Referral date before 10/7/11, source PCP and for low back pain, hip pain, leg and foot pain. HPI:  Donnamaria Boas is a 80 y.o. female here for f/u visit for ongoing evaluation of low back pain and bilateral lower extremity pain. Pt was last seen here on 4/30/18. Pt denies interval changes on the character or distribution of pain. Pain is located lower back/gluteal region and bilateral lower extremities to feet. The pain is described as aching. Pain at its best is 3/10. Pain at its worse is 9/10. The pain is worsened by prolonged standing. Symptoms are relieved by sitting, laying down. Pt has tried heat, ice, physical therapy (ten years ago) with no perceived benefit. History of cholangitis with chronic indwelling biliary tube. Has follow up with PCP next week. Patient presents today with her son. Patient denies pain today.       Social History     Social History    Marital status:      Spouse name: N/A    Number of children: N/A    Years of education: N/A     Occupational History    Not on file.      Social History Main Topics    Smoking status: Never Smoker    Smokeless tobacco: Never Used    Alcohol use No    Drug use: No    Sexual activity: No     Other Topics Concern    Not on file     Social History Narrative     Family History   Problem Relation Age of Onset    Hypertension Mother     Hypertension Father     Stroke Father      Allergies   Allergen Reactions    Aspirin Nausea Only and Unknown (comments)     Other reaction(s): gi distress     Past Medical History:   Diagnosis Date    Abdominal pain, other specified site 10/11    with abnml LFT, no etiology found    Abnormal LFTs     Anemia     Bile duct stone 11/13    Dr. Del Cid Slice of great toe of right foot 3/1/2013    Cholangitis 4/14/16    Chondromalacia of right shoulder 11/6/2014    Colon polyps     CRI (chronic renal insufficiency)     CVA (cerebral infarction)     LACUNAR    DDD (degenerative disc disease), lumbar 11/6/2014    Dementia     Diabetes ([facility] [address]     DJD (degenerative joint disease) of lumbar spine 1994    DJD (degenerative joint disease), multiple sites 11/6/2014    Gallstone pancreatitis     stent placed, June 2012    GERD (gastroesophageal reflux disease)     History of bone density study 1/07    wnl    Hypercholesterolemia     Hypertension     Lumbar arthropathy ([address] 11/6/2014    Lumbar nerve root impingement 11/6/2014    Lumbar post-laminectomy syndrome 11/6/2014    Pancolonic diverticulosis     Polyarthralgia 11/6/2014    Post laminectomy syndrome 2002    S/P lumbar spinal fusion 11/6/2014    Sacroiliitis ([address] 11/6/2014    Spondylolisthesis of lumbar region 11/6/2014    Subscapularis tendinitis of right shoulder 11/6/2014     Past Surgical History:   Procedure Laterality Date    COLONOSCOPY  5/29/12    Dr. Vibha Pierce, single polyp, 5 yr f/u    HX BUNIONECTOMY      LEFT    HX CARPAL TUNNEL RELEASE  2000    HX CHOLECYSTECTOMY  12/09    GALLSTONE PANCREATITIS    HX LUMBAR LAMINECTOMY  1994    HX TONSIL AND ADENOIDECTOMY       Current Outpatient Prescriptions on File Prior to Visit   Medication Sig    valsartan-hydroCHLOROthiazide (DIOVAN-HCT) 320-25 mg per tablet TAKE 1 TABLET BY MOUTH DAILY    potassium chloride (K-DUR, KLOR-CON) 20 mEq tablet TAKE 1 TABLET BY MOUTH TWICE DAILY    furosemide (LASIX) 40 mg tablet Take 1 Tab by mouth daily.     rivastigmine (EXELON) 4.6 mg/24 hr patch APPLY 1 PATCH TRANSDERMALLY EVERY DAY    amLODIPine (NORVASC) 10 mg tablet TAKE 1 TABLET BY MOUTH EVERY DAY    tolterodine ER (DETROL LA) 4 mg ER capsule TAKE ONE CAPSULE BY MOUTH DAILY    omeprazole (PRILOSEC) 40 mg capsule TAKE ONE CAPSULE BY MOUTH DAILY    JANUVIA 50 mg tablet TAKE 1 TABLET BY MOUTH EVERY DAY    ergocalciferol (ERGOCALCIFEROL) 50,000 unit capsule TAKE 1 CAPSULE BY MOUTH EVERY 7 DAYS    lidocaine (XYLOCAINE) 5 % ointment USE DAILY AS NEEDED FOR DRESSING CHANGES    fentaNYL (DURAGESIC) 50 mcg/hr PATCH 1 Patch by TransDERmal route every seventy-two (72) hours. Max Daily Amount: 1 Patch. For chronic pain    naloxone 4 mg/actuation spry 4 mg by Nasal route as needed.  HYDROcodone-acetaminophen (NORCO) 5-325 mg per tablet Take 1 Tab by Mouth Every 4 Hours As Needed for Pain.  ONETOUCH ULTRA TEST strip TEST BLOOD SUGAR DAILY OR AS DIRECTED    ursodiol (ACTIGALL) 300 mg capsule TAKE 1 CAPSULE BY MOUTH TWICE DAILY    cromolyn (OPTICROM) 4 % ophthalmic solution Administer 1 Drop to both eyes.  diclofenac sodium (SOLARAZE) 3 % topical gel Apply  to affected area two (2) times daily as needed for Pain.  mupirocin calcium (BACTROBAN) 2 % topical cream Apply  to affected area two (2) times a day.  Blood-Glucose Meter (ONE TOUCH ULTRA 2) monitoring kit Use to test blood sugar daily or as directed    Lancets (ONE TOUCH ULTRASOFT LANCETS) Misc Use to test blood sugar daily or as directed     No current facility-administered medications on file prior to visit. ROS:  Denies fever, chills, nausea, vomiting, diarrhea, constipation, abdominal pain, chest pain, shortness or breath/trouble breathing, weakness, trouble swallowing, changes in vision, changes in hearing, falls, dizziness, bladder incontinence, bowel incontinence, depression, anxiety, suicidal ideations, homicidal ideations or alcohol use. Opioid specific risk: DM, GERD. Opioid specific history: concurrent use of opioid since before 2011, with no opioid holiday. Vitals:    08/08/18 1120   BP: 114/57   Pulse: 79   Resp: 18   Temp: 98.4 °F (36.9 °C)   TempSrc: Oral   Weight: 65.8 kg (145 lb)   Height: 5' 3\" (1.6 m)   PainSc:   5   PainLoc: Back        Imaging:  \"\"\"\"\"\" 8/5/11 MRI spine lumbar W and W/O contrast  Impression:  Artifact from spinal fusion hardware limits evaluation of much of the lumbar spine. There are degenerative changes as described.   There is no narrowing of the visualized spinal canal.\"\"\"\"\"\"      Physical exam:  AFVSS, no acute distress, overweight body habitus. A&OXs 3. Normocephalic, atraumatic. Conjugate gaze, clear sclerae. Speech is clear and appropriate. Mood is appropriate and patient is cooperative. Antalgic gait and decreased obdulia with assistance of a rolling seated walker  Non-labored breathing. Lungs CTA B/L. No wheezing, rales or rhonchi. Heart RRR with S1 and S2 noted. No murmurs. Cervical, thoracic, lumbar, bilateral SIJ, bilateral piriformis, bilateral GT not tender to palpation  LE:   Strength for right lower extremity is 5/5 for hip flexion, knee extension, dorsiflexion, extensor hallucis longus, plantar flexion. Strength for left lower extremity is 5/5 for hip flexion, knee extension, dorsiflexion, extensor hallucis longus, plantar flexion. Sensation to light touch is intact for right L2-S2. Sensation to light touch is intact for left L2-S2. Muscle Stretch reflexes for bilateral lower extremities not tested    Full AROM lumbar flexion 50% to endrange did not reproduce primary pain  Full AROM lumbar extension 50% to endrange did not reproduce primary pain    Calculated MEQ - 210  Naloxone rescue - ordered  Prophylactic bowel program - no  Date of last OCA 12/15/17  Last UDS 04/30/18, consistent   date checked today, findings consistent    Primary Care Physician  Vincent Salinas [address] [place] [address]  [phone]    Today   Last Visit  ORT - 0    PGIC -5 and 3    RONDA -42%    COMM -4  6    PHQ -- . PHQ over the last two weeks 8/8/2018   Little interest or pleasure in doing things Not at all   Feeling down, depressed, irritable, or hopeless Not at all   Total Score PHQ 2 0       DSM V-OUD Screen -deferred    Assessment/Plan:     ICD-10-CM ICD-9-CM    1.  Chronic bilateral low back pain, with sciatica presence unspecified M54.5 724.2 fentaNYL 37.5 mcg/hour pt72    G89.29 338.29 morphine IR (MS IR) 30 mg tablet   2. Encounter for long-term (current) use of high-risk medication Z79.899 V58.69 fentaNYL 37.5 mcg/hour pt72      morphine IR (MS IR) 30 mg tablet      naloxone (NARCAN) 4 mg/actuation nasal spray   3. Chronic pain syndrome G89.4 338.4 fentaNYL 37.5 mcg/hour pt72      morphine IR (MS IR) 30 mg tablet        Order Narcan rescue medication to be used in case of opioid toxicity as needed    Over-the-counter Tylenol 500 mg tablets take 1-2 tablets up to 2 times a day as needed for pain. max daily dose of 2000 milligrams. Do not recommend long term opioid therapy for this patient at this time. Pt currently taking fentanyl 50 MCG patch 1 patch every 72 hours for pain and morphine IR 30 mg tablet take half to 1 tablet 3 times daily as needed for pain. Their MME is 210. Today, we will start the weaning of patients opioid medication with a goal of being opioid free, pending safety and compliance. Pt instructed to call if they experience any signs of withdrawal. Today, pt given prescription for fentanyl 37.5 MCG patch 1 patch every 72 hours for pain and morphine IR 30 mg, take 1 tablet up to 3 times a day as needed for pain. Their new MME is 180. Pt instructed to call 4 days before they run out of their medications for refill. At this time pt will be provided with fentanyl 25 MCG patch 1 patch every 72 hours for pain and morphine IR 30 mg take 1 tablet up to 3 times a day as needed for pain pending safety and compliance. At next office visit, the plan is to fentanyl 12 MCG patch 1 patch every 72 hours for pain morphine IR 30 mg tablet take 1 tablet 3 times a day as needed for pain. Their new MME will be 120. If patient has difficulty with the wean or difficulty with cravings we will consider referral to mental health for ongoing assessment and treatment for opioid use disorder.     Follow up ongoing assessment and ongoing development of integrative and comprehensive plan of care for chronic pain.    Goals: To establish complementary and integrative plan of care to address chronic pain issues while minimizing pharmaceuticals to maximize patient's function improve quality of life. Education:  Patient again educated on the importance of strict compliance with the opioid care agreement while on opioid therapy. Patient also again educated that they should avoid driving while on chronic opioid therapy. Also advised to avoid alcohol and to avoid benzodiazepines while on opioid therapy. Handouts given regarding opioid safety and sleep health. Follow-up Disposition:  Return in about 8 weeks (around 10/3/2018).

## 2018-08-08 NOTE — PROGRESS NOTES
Nursing Notes    Patient presents to the office today in follow-up. Patient rates her pain at 5/10 on the numerical pain scale. Reviewed medications with counts as follows:    Rx Date filled Qty Dispensed Pill Count Last Dose Short   Fentanyl 50 mcg 06/21/18 10 1+1on today no   Morphine 30 mg 07/06/18 75 11 This a.m no                                POC UDS was not performed in office today    Any new labs or imaging since last appointment? NO    Have you been to an emergency room (ER) or urgent care clinic since your last visit? NO            Have you been hospitalized since your last visit? NO     If yes, where, when, and reason for visit? Have you seen or consulted any other health care providers outside of the [address] [place] [address]  since your last visit? NO     If yes, where, when, and reason for visit? Ms. Ang Pate has a reminder for a \"due or due soon\" health maintenance. I have asked that she contact her primary care provider for follow-up on this health maintenance. Patient stated that she does not have Narcan.   PHQ over the last two weeks 8/8/2018   Little interest or pleasure in doing things Not at all   Feeling down, depressed, irritable, or hopeless Not at all   Total Score PHQ 2 0

## 2018-08-08 NOTE — PATIENT INSTRUCTIONS
Learning About Sleeping Well  What does sleeping well mean? Sleeping well means getting enough sleep. How much sleep is enough varies among people. The number of hours you sleep is not as important as how you feel when you wake up. If you do not feel refreshed, you probably need more sleep. Another sign of not getting enough sleep is feeling tired during the day. The average total nightly sleep time is 7½ to 8 hours. Healthy adults may need a little more or a little less than this. Why is getting enough sleep important? Getting enough quality sleep is a basic part of good health. When your sleep suffers, your mood and your thoughts can suffer too. You may find yourself feeling more grumpy or stressed. Not getting enough sleep also can lead to serious problems, including injury, accidents, anxiety, and depression. What might cause poor sleeping? Many things can cause sleep problems, including:  · Stress. Stress can be caused by fear about a single event, such as giving a speech. Or you may have ongoing stress, such as worry about work or school. · Depression, anxiety, and other mental or emotional conditions. · Changes in your sleep habits or surroundings. This includes changes that happen where you sleep, such as noise, light, or sleeping in a different bed. It also includes changes in your sleep pattern, such as having jet lag or working a late shift. · Health problems, such as pain, breathing problems, and restless legs syndrome. · Lack of regular exercise. How can you help yourself? Here are some tips that may help you sleep more soundly and wake up feeling more refreshed. Your sleeping area  · Use your bedroom only for sleeping and sex. A bit of light reading may help you fall asleep. But if it doesn't, do your reading elsewhere in the house. Don't watch TV in bed. · Be sure your bed is big enough to stretch out comfortably, especially if you have a sleep partner.   · Keep your bedroom quiet, dark, and cool. Use curtains, blinds, or a sleep mask to block out light. To block out noise, use earplugs, soothing music, or a \"white noise\" machine. Your evening and bedtime routine  · Create a relaxing bedtime routine. You might want to take a warm shower or bath, listen to soothing music, or drink a cup of noncaffeinated tea. · Go to bed at the same time every night. And get up at the same time every morning, even if you feel tired. What to avoid  · Limit caffeine (coffee, tea, caffeinated sodas) during the day, and don't have any for at least 4 to 6 hours before bedtime. · Don't drink alcohol before bedtime. Alcohol can cause you to wake up more often during the night. · Don't smoke or use tobacco, especially in the evening. Nicotine can keep you awake. · Don't take naps during the day, especially close to bedtime. · Don't lie in bed awake for too long. If you can't fall asleep, or if you wake up in the middle of the night and can't get back to sleep within 15 minutes or so, get out of bed and go to another room until you feel sleepy. · Don't take medicine right before bed that may keep you awake or make you feel hyper or energized. Your doctor can tell you if your medicine may do this and if you can take it earlier in the day. If you can't sleep  · Imagine yourself in a peaceful, pleasant scene. Focus on the details and feelings of being in a place that is relaxing. · Get up and do a quiet or boring activity until you feel sleepy. · Don't drink any liquids after 6 p.m. if you wake up often because you have to go to the bathroom. Where can you learn more? Go to http://ayse-fernandez.info/. Enter O750 in the search box to learn more about \"Learning About Sleeping Well. \"  Current as of: December 7, 2017  Content Version: 11.7  © 2316-0230 [employer], [facility].  Care instructions adapted under license by [employer] (which disclaims liability or warranty for this information). If you have questions about a medical condition or this instruction, always ask your healthcare professional. [address] any warranty or liability for your use of this information. Safe Use of Opioid Pain Medicine: Care Instructions  Your Care Instructions  Pain is your body's way of warning you that something is wrong. Pain feels different for everybody. Only you can describe your pain. A doctor can suggest or prescribe many types of medicines for pain. These range from over-the-counter medicines like acetaminophen (Tylenol) to powerful medicines called opioids. Examples of opioids are fentanyl, hydrocodone, morphine, and oxycodone. Heroin is an illegal opioid  Opioids are strong medicines. They can help you manage pain when you use them the right way. But if you misuse them, they can cause serious harm and even death. For these reasons, doctors are very careful about how they prescribe opioids. If you decide to take opioids, here are some things to remember. · Keep your doctor informed. You can get addicted to opioids. The risk is higher if you have a history of substance use. Your doctor will monitor you closely for signs of misuse and addiction and to figure out when you no longer need to take opioids. · Make a treatment plan. The goal of your plan is to be able to function and do the things you need to do, even if you still have some pain. You might be able to manage your pain with other non-opioid options like physical therapy, relaxation, or over-the-counter pain medicines. · Be aware of the side effects. Opioids can cause serious side effects, such as constipation, dry mouth, and nausea. And over time, you may need a higher dose to get pain relief. This is called tolerance. Your body also gets used to opioids. This is called physical dependence. If you suddenly stop taking them, you may have withdrawal symptoms.   The doctor carefully considered what pain medicine is right for you. You may not have received opioids if your doctor was concerned about drug interactions or your safety, or if he or she had other concerns. It is best to have one doctor or clinic treat your pain. This way you will get the pain medicine that will help you the most. And a doctor will be able to watch for any problems that the medicine might cause. The doctor has checked you carefully, but problems can develop later. If you notice any problems or new symptoms,  get medical treatment right away. Follow-up care is a key part of your treatment and safety. Be sure to make and go to all appointments, and call your doctor if you are having problems. It's also a good idea to know your test results and keep a list of the medicines you take. How can you care for yourself at home? · If you need to take opioids to manage your pain, remember these safety tips. ¨ Follow directions carefully. It's easy to misuse opioids if you take a dose other than what's prescribed by your doctor. This can lead to overdose and even death. Even sharing them with someone they weren't meant for is misuse. ¨ Be cautious. Opioids may affect your judgment and decision making. Do not drive or operate machinery until you can think clearly. Talk with your doctor about when it is safe to drive. ¨ Reduce the risk of drug interactions. Opioids can be dangerous if you take them with alcohol or with certain drugs like sleeping pills and muscle relaxers. Make sure your doctor knows about all the other medicines you take, including over-the-counter medicines. Don't start any new medicines before you talk to your doctor or pharmacist.  Kaitlynn Watson Keep others safe. Store opioids in a safe and secure place. Make sure that pets, children, friends, and family can't get to them. When you're done using opioids, make sure to properly dispose of them.  You can either use a community drug take-back program or your drugstore's mail-back program. If one of these programs isn't available, you can flush opioid skin patches and unused opioid pills down the toilet. ¨ Reduce the risk of overdose. Misuse of opioids can be very dangerous. Protect yourself by asking your doctor about a naloxone rescue kit. It can help you-and even save your life-if you take too much of an opioid. · Try other ways to reduce pain. ¨ Relax, and reduce stress. Relaxation techniques such as deep breathing or meditation can help. ¨ Keep moving. Gentle, daily exercise can help reduce pain over the long run. Try low- or no-impact exercises such as walking, swimming, and stationary biking. Do stretches to stay flexible. ¨ Try heat, cold packs, and massage. ¨ Get enough sleep. Pain can make you tired and drain your energy. Talk with your doctor if you have trouble sleeping because of pain. ¨ Think positive. Your thoughts can affect your pain level. Do things that you enjoy to distract yourself when you have pain instead of focusing on the pain. See a movie, read a book, listen to music, or spend time with a friend. · If you are not taking a prescription pain medicine, ask your doctor if you can take an over-the-counter medicine. When should you call for help? Call your doctor now or seek immediate medical care if:    · You have a new kind of pain.     · You have new symptoms, such as a fever or rash, along with the pain.    Watch closely for changes in your health, and be sure to contact your doctor if:    · You think you might be using too much pain medicine, and you need help to use less or stop.     · Your pain gets worse.     · You would like a referral to a doctor or clinic that specializes in pain management. Where can you learn more? Go to http://ayse-fernandez.info/. Enter R108 in the search box to learn more about \"Safe Use of Opioid Pain Medicine: Care Instructions. \"  Current as of: September 10, 2017  Content Version: 11.7  © 1219-7126 [employer]. Care instructions adapted under license by [employer] (which disclaims liability or warranty for this information). If you have questions about a medical condition or this instruction, always ask your healthcare professional. [address] any warranty or liability for your use of this information.

## 2018-08-08 NOTE — MR AVS SNAPSHOT
[address] [facility] [address] 
[phone] Patient: Franco Rivers MRN: [id number] Visit Information Date & Time Provider Department Dept. Phone Encounter #  
 2018 11:00 AM Umer Marshall NP [address] for Pain Management [phone] Follow-up Instructions Return in about 8 weeks (around 10/3/2018). Your Appointments 2018 11:00 AM  
Office Visit with MD [address] [facility]) Appt Note: 4 month f/u HTN/DM chol  
 [address]  
  
   
 [address] [facility] Upcoming Health Maintenance Date Due  
 LIPID PANEL Q1 3/7/2018 Influenza Age 5 to Adult 2018 MICROALBUMIN Q1 2018* ZOSTER VACCINE AGE 60> 2019* HEMOGLOBIN A1C Q6M 10/12/2018 EYE EXAM RETINAL OR DILATED Q1 2019 MEDICARE YEARLY EXAM 2019 GLAUCOMA SCREENING Q2Y 2020 DTaP/Tdap/Td series (2 - Td) 2022 *Topic was postponed. The date shown is not the original due date. Allergies as of 2018  Review Complete On: 2018 By: Juan Bell Severity Noted Reaction Type Reactions Aspirin    Nausea Only, Unknown (comments) Other reaction(s): gi distress Current Immunizations  Reviewed on 10/6/2015 Name Date Influenza High Dose Vaccine PF 2017, 10/19/2016, 10/6/2015 12:25 PM  
 Influenza Vaccine 10/15/2017 12:00 AM, 10/4/2016 12:00 AM, 2014 Influenza Vaccine Split 10/25/2012  1:24 PM  
 Pneumococcal Conjugate (PCV-13) 10/19/2016  3:50 PM  
 Pneumococcal Polysaccharide (PPSV-23) 2016 12:00 AM  
 TDAP Vaccine 2012 ZZZ-RETIRED (DO NOT USE) Pneumococcal Vaccine (Unspecified Type) 2009 Not reviewed this visit You Were Diagnosed With   
  
 Codes Comments Chronic bilateral low back pain, with sciatica presence unspecified    -  Primary ICD-10-CM: M54.5, G89.29 ICD-9-CM: 724.2, 338.29 Encounter for long-term (current) use of high-risk medication     ICD-10-CM: Z79.899 ICD-9-CM: V58.69 Chronic pain syndrome     ICD-10-CM: G89.4 ICD-9-CM: 338. 4 Vitals BP Pulse Temp Resp Height(growth percentile) Weight(growth percentile) 114/57 (BP 1 Location: Right arm, BP Patient Position: Sitting) 79 98.4 °F (36.9 °C) (Oral) 18 5' 3\" (1.6 m) 145 lb (65.8 kg) BMI OB Status Smoking Status 25.69 kg/m2 Postmenopausal Never Smoker BMI and BSA Data Body Mass Index Body Surface Area  
 25.69 kg/m 2 1.71 m 2 Preferred Pharmacy Pharmacy Name Phone [facility] [phone], [address] [phone] Your Updated Medication List  
  
   
This list is accurate as of 8/8/18 12:16 PM.  Always use your most recent med list. amLODIPine 10 mg tablet Commonly known as:  Leslie Haven TAKE 1 TABLET BY MOUTH EVERY DAY Blood-Glucose Meter monitoring kit Commonly known as:  Yina Geiger Use to test blood sugar daily or as directed  
  
 cromolyn 4 % ophthalmic solution Commonly known as:  OPTICROM Administer 1 Drop to both eyes. diclofenac 3 % topical gel Commonly known as:  Ana Burrs Apply  to affected area two (2) times daily as needed for Pain.  
  
 ergocalciferol 50,000 unit capsule Commonly known as:  ERGOCALCIFEROL  
TAKE 1 CAPSULE BY MOUTH EVERY 7 DAYS  
  
 * fentaNYL 50 mcg/hr PATCH Commonly known as:  DURAGESIC  
1 Patch by TransDERmal route every seventy-two (72) hours. Max Daily Amount: 1 Patch. For chronic pain * fentaNYL 37.5 mcg/hour Pt72  
37.5 mcg by TransDERmal route every seventy-two (72) hours for 30 days. Max Daily Amount: 37.5 mcg. furosemide 40 mg tablet Commonly known as:  LASIX Take 1 Tab by mouth daily. HYDROcodone-acetaminophen 5-325 mg per tablet Commonly known as:  Juanetta Rasp Take 1 Tab by Mouth Every 4 Hours As Needed for Pain. JANUVIA 50 mg tablet Generic drug:  SITagliptin TAKE 1 TABLET BY MOUTH EVERY DAY Lancets Misc Commonly known as:  ONETOUCH ULTRASOFT LANCETS Use to test blood sugar daily or as directed  
  
 lidocaine 5 % ointment Commonly known as:  XYLOCAINE  
USE DAILY AS NEEDED FOR DRESSING CHANGES  
  
 morphine IR 30 mg tablet Commonly known as:  MS IR Take 1 Tab by mouth every eight (8) hours as needed for Pain for up to 30 days. Max Daily Amount: 90 mg.  
  
 mupirocin calcium 2 % topical cream  
Commonly known as:  [employer] Apply  to affected area two (2) times a day. * naloxone 4 mg/actuation nasal spray Commonly known as:  NARCAN  
4 mg by Nasal route as needed. * naloxone 4 mg/actuation nasal spray Commonly known as:  [employer] Use 1 spray intranasally into 1 nostril as needed for opioid respiratory emergency only. Indications: OPIATE-INDUCED RESPIRATORY DEPRESSION  
  
 omeprazole 40 mg capsule Commonly known as:  PRILOSEC  
TAKE ONE CAPSULE BY MOUTH DAILY  
  
 ONETOUCH ULTRA TEST strip Generic drug:  glucose blood VI test strips TEST BLOOD SUGAR DAILY OR AS DIRECTED  
  
 potassium chloride 20 mEq tablet Commonly known as:  K-DUR, KLOR-CON  
TAKE 1 TABLET BY MOUTH TWICE DAILY  
  
 rivastigmine 4.6 mg/24 hr patch Commonly known as:  EXELON  
APPLY 1 PATCH TRANSDERMALLY EVERY DAY  
  
 tolterodine ER 4 mg ER capsule Commonly known as:  DETROL LA  
TAKE ONE CAPSULE BY MOUTH DAILY  
  
 ursodiol 300 mg capsule Commonly known as:  ACTIGALL TAKE 1 CAPSULE BY MOUTH TWICE DAILY  
  
 valsartan-hydroCHLOROthiazide 320-25 mg per tablet Commonly known as:  DIOVAN-HCT  
TAKE 1 TABLET BY MOUTH DAILY * Notice:   This list has 4 medication(s) that are the same as other medications prescribed for you. Read the directions carefully, and ask your doctor or other care provider to review them with you. Prescriptions Printed Refills  
 fentaNYL 37.5 mcg/hour pt72 0 Si.5 mcg by TransDERmal route every seventy-two (72) hours for 30 days. Max Daily Amount: 37.5 mcg. Class: Print Route: TransDERmal  
 morphine IR (MS IR) 30 mg tablet 0 Sig: Take 1 Tab by mouth every eight (8) hours as needed for Pain for up to 30 days. Max Daily Amount: 90 mg.  
 Class: Print Route: Oral  
  
Prescriptions Sent to Pharmacy Refills  
 naloxone (NARCAN) 4 mg/actuation nasal spray 0 Sig: Use 1 spray intranasally into 1 nostril as needed for opioid respiratory emergency only. Indications: OPIATE-INDUCED RESPIRATORY DEPRESSION Class: Normal  
 Pharmacy: [facility] [address] [place] [address] Rd, [address] #: [phone] Follow-up Instructions Return in about 8 weeks (around 10/3/2018). Patient Instructions Learning About Sleeping Well What does sleeping well mean? Sleeping well means getting enough sleep. How much sleep is enough varies among people. The number of hours you sleep is not as important as how you feel when you wake up. If you do not feel refreshed, you probably need more sleep. Another sign of not getting enough sleep is feeling tired during the day. The average total nightly sleep time is 7½ to 8 hours. Healthy adults may need a little more or a little less than this. Why is getting enough sleep important? Getting enough quality sleep is a basic part of good health. When your sleep suffers, your mood and your thoughts can suffer too. You may find yourself feeling more grumpy or stressed. Not getting enough sleep also can lead to serious problems, including injury, accidents, anxiety, and depression. What might cause poor sleeping? Many things can cause sleep problems, including: · Stress. Stress can be caused by fear about a single event, such as giving a speech. Or you may have ongoing stress, such as worry about work or school. · Depression, anxiety, and other mental or emotional conditions. · Changes in your sleep habits or surroundings. This includes changes that happen where you sleep, such as noise, light, or sleeping in a different bed. It also includes changes in your sleep pattern, such as having jet lag or working a late shift. · Health problems, such as pain, breathing problems, and restless legs syndrome. · Lack of regular exercise. How can you help yourself? Here are some tips that may help you sleep more soundly and wake up feeling more refreshed. Your sleeping area · Use your bedroom only for sleeping and sex. A bit of light reading may help you fall asleep. But if it doesn't, do your reading elsewhere in the house. Don't watch TV in bed. · Be sure your bed is big enough to stretch out comfortably, especially if you have a sleep partner. · Keep your bedroom quiet, dark, and cool. Use curtains, blinds, or a sleep mask to block out light. To block out noise, use earplugs, soothing music, or a \"white noise\" machine. Your evening and bedtime routine · Create a relaxing bedtime routine. You might want to take a warm shower or bath, listen to soothing music, or drink a cup of noncaffeinated tea. · Go to bed at the same time every night. And get up at the same time every morning, even if you feel tired. What to avoid · Limit caffeine (coffee, tea, caffeinated sodas) during the day, and don't have any for at least 4 to 6 hours before bedtime. · Don't drink alcohol before bedtime. Alcohol can cause you to wake up more often during the night. · Don't smoke or use tobacco, especially in the evening. Nicotine can keep you awake. · Don't take naps during the day, especially close to bedtime. · Don't lie in bed awake for too long. If you can't fall asleep, or if you wake up in the middle of the night and can't get back to sleep within 15 minutes or so, get out of bed and go to another room until you feel sleepy. · Don't take medicine right before bed that may keep you awake or make you feel hyper or energized. Your doctor can tell you if your medicine may do this and if you can take it earlier in the day. If you can't sleep · Imagine yourself in a peaceful, pleasant scene. Focus on the details and feelings of being in a place that is relaxing. · Get up and do a quiet or boring activity until you feel sleepy. · Don't drink any liquids after 6 p.m. if you wake up often because you have to go to the bathroom. Where can you learn more? Go to http://ayseLIQUITYfernandez.info/. Enter L960 in the search box to learn more about \"Learning About Sleeping Well. \" Current as of: December 7, 2017 Content Version: 11.7 © 3589-6897 [employer]. Care instructions adapted under license by [employer] (which disclaims liability or warranty for this information). If you have questions about a medical condition or this instruction, always ask your healthcare professional. [place] [address] any warranty or liability for your use of this information. Safe Use of Opioid Pain Medicine: Care Instructions Your Care Instructions Pain is your body's way of warning you that something is wrong. Pain feels different for everybody. Only you can describe your pain. A doctor can suggest or prescribe many types of medicines for pain. These range from over-the-counter medicines like acetaminophen (Tylenol) to powerful medicines called opioids. Examples of opioids are fentanyl, hydrocodone, morphine, and oxycodone. Heroin is an illegal opioid Opioids are strong medicines.  They can help you manage pain when you use them the right way. But if you misuse them, they can cause serious harm and even death. For these reasons, doctors are very careful about how they prescribe opioids. If you decide to take opioids, here are some things to remember. · Keep your doctor informed. You can get addicted to opioids. The risk is higher if you have a history of substance use. Your doctor will monitor you closely for signs of misuse and addiction and to figure out when you no longer need to take opioids. · Make a treatment plan. The goal of your plan is to be able to function and do the things you need to do, even if you still have some pain. You might be able to manage your pain with other non-opioid options like physical therapy, relaxation, or over-the-counter pain medicines. · Be aware of the side effects. Opioids can cause serious side effects, such as constipation, dry mouth, and nausea. And over time, you may need a higher dose to get pain relief. This is called tolerance. Your body also gets used to opioids. This is called physical dependence. If you suddenly stop taking them, you may have withdrawal symptoms. The doctor carefully considered what pain medicine is right for you. You may not have received opioids if your doctor was concerned about drug interactions or your safety, or if he or she had other concerns. It is best to have one doctor or clinic treat your pain. This way you will get the pain medicine that will help you the most. And a doctor will be able to watch for any problems that the medicine might cause. The doctor has checked you carefully, but problems can develop later. If you notice any problems or new symptoms,  get medical treatment right away. Follow-up care is a key part of your treatment and safety. Be sure to make and go to all appointments, and call your doctor if you are having problems. It's also a good idea to know your test results and keep a list of the medicines you take. How can you care for yourself at home? · If you need to take opioids to manage your pain, remember these safety tips. ¨ Follow directions carefully. It's easy to misuse opioids if you take a dose other than what's prescribed by your doctor. This can lead to overdose and even death. Even sharing them with someone they weren't meant for is misuse. ¨ Be cautious. Opioids may affect your judgment and decision making. Do not drive or operate machinery until you can think clearly. Talk with your doctor about when it is safe to drive. ¨ Reduce the risk of drug interactions. Opioids can be dangerous if you take them with alcohol or with certain drugs like sleeping pills and muscle relaxers. Make sure your doctor knows about all the other medicines you take, including over-the-counter medicines. Don't start any new medicines before you talk to your doctor or pharmacist. 
Miya Odonnell Keep others safe. Store opioids in a safe and secure place. Make sure that pets, children, friends, and family can't get to them. When you're done using opioids, make sure to properly dispose of them. You can either use a community drug take-back program or your drugstore's mail-back program. If one of these programs isn't available, you can flush opioid skin patches and unused opioid pills down the toilet. ¨ Reduce the risk of overdose. Misuse of opioids can be very dangerous. Protect yourself by asking your doctor about a naloxone rescue kit. It can help you-and even save your life-if you take too much of an opioid. · Try other ways to reduce pain. ¨ Relax, and reduce stress. Relaxation techniques such as deep breathing or meditation can help. ¨ Keep moving. Gentle, daily exercise can help reduce pain over the long run. Try low- or no-impact exercises such as walking, swimming, and stationary biking. Do stretches to stay flexible. ¨ Try heat, cold packs, and massage. ¨ Get enough sleep. Pain can make you tired and drain your energy.  Talk with your doctor if you have trouble sleeping because of pain. ¨ Think positive. Your thoughts can affect your pain level. Do things that you enjoy to distract yourself when you have pain instead of focusing on the pain. See a movie, read a book, listen to music, or spend time with a friend. · If you are not taking a prescription pain medicine, ask your doctor if you can take an over-the-counter medicine. When should you call for help? Call your doctor now or seek immediate medical care if: 
  · You have a new kind of pain.  
  · You have new symptoms, such as a fever or rash, along with the pain.  
 Watch closely for changes in your health, and be sure to contact your doctor if: 
  · You think you might be using too much pain medicine, and you need help to use less or stop.  
  · Your pain gets worse.  
  · You would like a referral to a doctor or clinic that specializes in pain management. Where can you learn more? Go to http://ayse-fernandez.info/. Enter R108 in the search box to learn more about \"Safe Use of Opioid Pain Medicine: Care Instructions. \" Current as of: September 10, 2017 Content Version: 11.7 © 0203-3715 [employer]. Care instructions adapted under license by [employer] (which disclaims liability or warranty for this information). If you have questions about a medical condition or this instruction, always ask your healthcare professional. [place] [address] any warranty or liability for your use of this information. Introducing [facility]! [employer] introduces [facility] patient portal. Now you can access parts of your medical record, email your doctor's office, and request medication refills online. 1. In your internet browser, go to [email or web address] 2. Click on the First Time User? Click Here link in the Sign In box. You will see the New Member Sign Up page. 3. Enter your DoPay Access Code exactly as it appears below. You will not need to use this code after youve completed the sign-up process. If you do not sign up before the expiration date, you must request a new code. · DoPay Access Code: [email or web address] Expires: 11/6/2018 12:16 PM 
 
4. Enter the last four digits of your Social Security Number (xxxx) and Date of Birth (mm/dd/yyyy) as indicated and click Submit. You will be taken to the next sign-up page. 5. Create a DoPay ID. This will be your DoPay login ID and cannot be changed, so think of one that is secure and easy to remember. 6. Create a DoPay password. You can change your password at any time. 7. Enter your Password Reset Question and Answer. This can be used at a later time if you forget your password. 8. Enter your e-mail address. You will receive e-mail notification when new information is available in [address]. 9. Click Sign Up. You can now view and download portions of your medical record. 10. Click the Download Summary menu link to download a portable copy of your medical information. If you have questions, please visit the Frequently Asked Questions section of the DoPay website. Remember, DoPay is NOT to be used for urgent needs. For medical emergencies, dial 911. Now available from your iPhone and Android! Please provide this summary of care documentation to your next provider. Your primary care clinician is listed as [facility]. If you have any questions after today's visit, please call [phone].

## 2018-08-15 ENCOUNTER — TELEPHONE (OUTPATIENT)
Dept: PAIN MANAGEMENT | Age: 83
End: 2018-08-15

## 2018-08-15 NOTE — TELEPHONE ENCOUNTER
Called [employer] and spoke to pharmacistBao and she stated that a PA is not needed for patient's Fentanyl 37.5 mcg/hr patch and that she told the patient this information already. She also instructed the patient to call their Insurance company if they have an issue with the cost of the Fentanyl 37.5 mcg/hr patch.

## 2018-08-15 NOTE — TELEPHONE ENCOUNTER
Voice mail:8/14 and 8/15    Nora Hoyt calling regarding his mother Chel Cortes. He states having problems with fentanyl patch and needed a call quickly. I attempted to contact Mr Milan Scheuermann and left voice mail.   Call patient at [phone]

## 2018-08-16 ENCOUNTER — OFFICE VISIT (OUTPATIENT)
Dept: INTERNAL MEDICINE CLINIC | Age: 83
End: 2018-08-16

## 2018-08-16 VITALS
HEART RATE: 89 BPM | RESPIRATION RATE: 18 BRPM | WEIGHT: 150.6 LBS | HEIGHT: 63 IN | SYSTOLIC BLOOD PRESSURE: 126 MMHG | DIASTOLIC BLOOD PRESSURE: 53 MMHG | TEMPERATURE: 98.4 F | BODY MASS INDEX: 26.68 KG/M2

## 2018-08-16 DIAGNOSIS — Z76.0 MEDICATION REFILL: ICD-10-CM

## 2018-08-16 DIAGNOSIS — I10 ESSENTIAL HYPERTENSION: Primary | Chronic | ICD-10-CM

## 2018-08-16 DIAGNOSIS — E78.00 HYPERCHOLESTEREMIA: Chronic | ICD-10-CM

## 2018-08-16 DIAGNOSIS — E11.9 TYPE 2 DIABETES MELLITUS WITHOUT COMPLICATION, WITHOUT LONG-TERM CURRENT USE OF INSULIN (HCC): Chronic | ICD-10-CM

## 2018-08-16 DIAGNOSIS — N18.30 CHRONIC RENAL IMPAIRMENT, STAGE 3 (MODERATE) (HCC): Chronic | ICD-10-CM

## 2018-08-16 DIAGNOSIS — F01.50 VASCULAR DEMENTIA WITHOUT BEHAVIORAL DISTURBANCE (HCC): Chronic | ICD-10-CM

## 2018-08-16 LAB
GLUCOSE POC: 132 MG/DL
HBA1C MFR BLD HPLC: 5.9 %

## 2018-08-16 NOTE — PROGRESS NOTES
HISTORY OF PRESENT ILLNESS  Crow Whitakre is a 80 y.o. female. Visit Vitals    /53 (BP 1 Location: Left arm, BP Patient Position: Sitting)    Pulse 89    Temp 98.4 °F (36.9 °C) (Oral)    Resp 18    Ht 5' 3\" (1.6 m)    Wt 150 lb 9.6 oz (68.3 kg)    PF 98 L/min    BMI 26.68 kg/m2       HPI Comments: Pt also reported a fall 2 days ago in the dining room. Her walker \"got away from her\". It was on soft carpet. Her son picked her up and says she is fine and not complaining. Pt verifies this. She denies LOC    Hypertension    The history is provided by the patient. This is a chronic problem. The current episode started more than 1 week ago. The problem has not changed since onset. Pertinent negatives include no chest pain, no palpitations, no headaches, no dizziness and no shortness of breath. Risk factors include postmenopause, obesity and a sedentary lifestyle. Diabetes   The history is provided by the patient. This is a chronic problem. The current episode started more than 1 week ago. The problem occurs daily. The problem has not changed since onset. Pertinent negatives include no chest pain, no headaches and no shortness of breath. Exacerbated by: diet. The symptoms are relieved by medications (diet). Treatments tried: see meds. The treatment provided moderate relief. Cholesterol Problem   The history is provided by the patient. This is a chronic problem. The current episode started more than 1 week ago. The problem occurs daily. The problem has not changed since onset. Pertinent negatives include no chest pain, no headaches and no shortness of breath. Review of Systems   Constitutional: Negative for chills and fever. Respiratory: Negative for shortness of breath. Cardiovascular: Positive for leg swelling (right foot swells (chronic)). Negative for chest pain and palpitations. Gastrointestinal:        Appetite good   Musculoskeletal: Positive for falls.    Neurological: Negative for dizziness and headaches. Psychiatric/Behavioral: Positive for memory loss (no significant change. Conversant but slowly. Memory impaired. Understands simple concepts). Physical Exam   Constitutional: She is oriented to person, place, and time. She appears well-developed and well-nourished. No distress. Cardiovascular: Normal rate and regular rhythm. Pulmonary/Chest: Effort normal and breath sounds normal.   Musculoskeletal: She exhibits no edema. Uses walker   Neurological: She is alert and oriented to person, place, and time. Skin: Skin is warm and dry. She is not diaphoretic. Psychiatric: She has a normal mood and affect. Nursing note and vitals reviewed. ASSESSMENT and PLAN    ICD-10-CM ICD-9-CM    1. Essential hypertension I10 401.9    2. Hypercholesteremia E78.00 272.0    3. Chronic renal impairment, stage 3 (moderate) N18.3 585.3    4. Vascular dementia without behavioral disturbance F01.50 290.40    5. Type 2 diabetes mellitus without complication, without long-term current use of insulin ([facility]) E11.9 250.00 SITagliptin (JANUVIA) 25 mg tablet      AMB POC GLUCOSE BLOOD, BY GLUCOSE MONITORING DEVICE      AMB POC HEMOGLOBIN A1C   6. Medication refill Z76.0 V68.1 SITagliptin (JANUVIA) 25 mg tablet       BP controlled    Reviewed last lab. Will check Gluc and HBA1c only today    BS has been great--will cut Januvia in half and consider stopping altogether.     F/u 4 months

## 2018-08-16 NOTE — PROGRESS NOTES
ROOM # 4    Bret Troncoso presents today for   Chief Complaint   Patient presents with    Hypertension     4m f/u    Diabetes    Cholesterol Problem       Bret Troncoso preferred language for health care discussion is english/other. Is someone accompanying this pt? Yes, son    Is the patient using any DME equipment during [address]? Yes, rollator    Depression Screening:  PHQ over the last two weeks 8/16/2018 8/8/2018 4/30/2018 1/25/2018 12/11/2017 9/18/2017 5/11/2017   Little interest or pleasure in doing things Not at all Not at all Not at all Not at all Not at all Not at all Not at all   Feeling down, depressed, irritable, or hopeless Not at all Not at all Not at all Not at all Not at all Not at all Not at all   Total Score PHQ 2 0 0 0 0 0 0 0       Learning Assessment:  Learning Assessment 8/8/2018 1/25/2018 5/26/2015 12/26/2013   PRIMARY LEARNER Patient Patient Patient Patient   HIGHEST LEVEL OF EDUCATION - PRIMARY LEARNER  [address] PRIMARY LEARNER HEARING HEARING [address] CAREGIVER Yes Yes Yes -   H[address] 69 > 4 YEARS [address] > 4 YEARS [address] - -   [address] - - -   ANSWERED BY self self patient patient   RELATIONSHIP SELF SELF SELF SELF       Abuse Screening:  Abuse Screening Questionnaire 8/8/2018 1/25/2018 11/30/2017   Do you ever feel afraid of your partner? N N N   Are you in a relationship with someone who physically or mentally threatens you? N N N   Is it safe for you to go home?  Reyna Carmer       Fall Risk  Fall Risk Assessment, last 12 mths 8/16/2018 8/8/2018 1/25/2018 9/18/2017 5/11/2017 3/20/2017 4/5/2016   Able to walk? Yes Yes Yes Yes Yes Yes Yes   Fall in past 12 months? Yes No No No No No No   Fall with injury? No - - - - - -   Number of falls in past 12 months 1 - - - - - -   Fall Risk Score 1 - - - - - -       Health Maintenance reviewed and discussed per provider. Yes    Chavo Judd is due for   Health Maintenance Due   Topic Date Due    LIPID PANEL Q1  03/07/2018    Influenza Age 5 to Adult  08/01/2018     Please order/place referral if appropriate. Advance Directive:  1. Do you have an advance directive in place? Patient Reply: no    2. If not, would you like material regarding how to put one in place? Patient Reply: no    Coordination of Care:  1. Have you been to the ER, urgent care clinic since your last visit? Hospitalized since your last visit? no    2. Have you seen or consulted any other health care providers outside of the [address] [place] [address] since your last visit? Include any pap smears or colon screening.  no

## 2018-08-16 NOTE — MR AVS SNAPSHOT
[facility] 
 
 
 [address] [facility] [address] [id number] 
[phone] Patient: Cynthia Akers MRN: [id number] Visit Information Date & Time Provider Department Dept. Phone Encounter #  
 2018 11:00 AM Angel Nj MD [facility] [phone] [id number] Follow-up Instructions Return in about 4 months (around 2018) for HTN, DM, cholesterol. Your Appointments 10/2/2018 11:00 AM  
Office Visit with Joshua Mckenna NP [address] [place] [address] Pain Management (LEV SCHEDULING) Appt Note: Return in about 8 weeks (around 10/3/2018). [address] [place] [address]  
[phone] [facility] [phone] Upcoming Health Maintenance Date Due Influenza Age 5 to Adult 2018 MICROALBUMIN Q1 2018* ZOSTER VACCINE AGE 60> 2019* LIPID PANEL Q1 2021* HEMOGLOBIN A1C Q6M 10/12/2018 EYE EXAM RETINAL OR DILATED Q1 2019 MEDICARE YEARLY EXAM 2019 GLAUCOMA SCREENING Q2Y 2020 DTaP/Tdap/Td series (2 - Td) 2022 *Topic was postponed. The date shown is not the original due date. Allergies as of 2018  Review Complete On: 2018 By: Angel Nj MD  
  
 Severity Noted Reaction Type Reactions Aspirin    Nausea Only, Unknown (comments) Other reaction(s): gi distress Current Immunizations  Reviewed on 10/6/2015 Name Date Influenza High Dose Vaccine PF 2017, 10/19/2016, 10/6/2015 12:25 PM  
 Influenza Vaccine 10/15/2017 12:00 AM, 10/4/2016 12:00 AM, 2014 Influenza Vaccine Split 10/25/2012  1:24 PM  
 Pneumococcal Conjugate (PCV-13) 10/19/2016  3:50 PM  
 Pneumococcal Polysaccharide (PPSV-23) 2016 12:00 AM  
 TDAP Vaccine 2012 ZZZ-RETIRED (DO NOT USE) Pneumococcal Vaccine (Unspecified Type) 2009 Not reviewed this visit You Were Diagnosed With   
  
 Codes Comments Essential hypertension    -  Primary ICD-10-CM: I10 
ICD-9-CM: 401.9 Hypercholesteremia     ICD-10-CM: E78.00 ICD-9-CM: 272.0 Chronic renal impairment, stage 3 (moderate)     ICD-10-CM: N18.3 ICD-9-CM: 415. 3 Vascular dementia without behavioral disturbance     ICD-10-CM: F01.50 ICD-9-CM: 290.40 Type 2 diabetes mellitus without complication, without long-term current use of insulin (HCC)     ICD-10-CM: E11.9 ICD-9-CM: 250.00 Medication refill     ICD-10-CM: Z76.0 ICD-9-CM: V68.1 Vitals BP Pulse Temp Resp Height(growth percentile) Weight(growth percentile) 126/53 (BP 1 Location: Left arm, BP Patient Position: Sitting) 89 98.4 °F (36.9 °C) (Oral) 18 5' 3\" (1.6 m) 150 lb 9.6 oz (68.3 kg) PF BMI OB Status Smoking Status 98 L/min 26.68 kg/m2 Postmenopausal Never Smoker Vitals History BMI and BSA Data Body Mass Index Body Surface Area  
 26.68 kg/m 2 1.74 m 2 Preferred Pharmacy Pharmacy Name Phone [facility], [address] [place] [address] [phone] Your Updated Medication List  
  
   
This list is accurate as of 8/16/18 11:54 AM.  Always use your most recent med list. amLODIPine 10 mg tablet Commonly known as:  Tad Lauren TAKE 1 TABLET BY MOUTH EVERY DAY Blood-Glucose Meter monitoring kit Commonly known as:  Juan Heard Use to test blood sugar daily or as directed  
  
 cromolyn 4 % ophthalmic solution Commonly known as:  OPTICROM Administer 1 Drop to both eyes. diclofenac 3 % topical gel Commonly known as:  Theresa Presto Apply  to affected area two (2) times daily as needed for Pain.  
  
 ergocalciferol 50,000 unit capsule Commonly known as:  ERGOCALCIFEROL  
TAKE 1 CAPSULE BY MOUTH EVERY 7 DAYS  
  
 fentaNYL 37.5 mcg/hour Pt72 37.5 mcg by TransDERmal route every seventy-two (72) hours for 30 days. Max Daily Amount: 37.5 mcg. furosemide 40 mg tablet Commonly known as:  LASIX Take 1 Tab by mouth daily. HYDROcodone-acetaminophen 5-325 mg per tablet Commonly known as:  Lenon Gauze Take 1 Tab by Mouth Every 4 Hours As Needed for Pain. Lancets Misc Commonly known as:  ONETOUCH ULTRASOFT LANCETS Use to test blood sugar daily or as directed  
  
 lidocaine 5 % ointment Commonly known as:  XYLOCAINE  
USE DAILY AS NEEDED FOR DRESSING CHANGES  
  
 morphine IR 30 mg tablet Commonly known as:  MS IR Take 1 Tab by mouth every eight (8) hours as needed for Pain for up to 30 days. Max Daily Amount: 90 mg.  
  
 mupirocin calcium 2 % topical cream  
Commonly known as:  [employer] Apply  to affected area two (2) times a day. * naloxone 4 mg/actuation nasal spray Commonly known as:  NARCAN  
4 mg by Nasal route as needed. * naloxone 4 mg/actuation nasal spray Commonly known as:  [employer] Use 1 spray intranasally into 1 nostril as needed for opioid respiratory emergency only. Indications: OPIATE-INDUCED RESPIRATORY DEPRESSION  
  
 omeprazole 40 mg capsule Commonly known as:  PRILOSEC  
TAKE ONE CAPSULE BY MOUTH DAILY  
  
 ONETOUCH ULTRA TEST strip Generic drug:  glucose blood VI test strips TEST BLOOD SUGAR DAILY OR AS DIRECTED  
  
 potassium chloride 20 mEq tablet Commonly known as:  K-DUR, KLOR-CON  
TAKE 1 TABLET BY MOUTH TWICE DAILY  
  
 rivastigmine 4.6 mg/24 hr patch Commonly known as:  EXELON  
APPLY 1 PATCH TRANSDERMALLY EVERY DAY SITagliptin 25 mg tablet Commonly known as:  Cachorro Yanet Take 1 Tab by mouth daily. tolterodine ER 4 mg ER capsule Commonly known as:  DETROL LA  
TAKE ONE CAPSULE BY MOUTH DAILY  
  
 ursodiol 300 mg capsule Commonly known as:  ACTIGALL TAKE 1 CAPSULE BY MOUTH TWICE DAILY  
  
 valsartan-hydroCHLOROthiazide 320-25 mg per tablet Commonly known as:  DIOVAN-HCT  
TAKE 1 TABLET BY MOUTH DAILY * Notice: This list has 2 medication(s) that are the same as other medications prescribed for you. Read the directions carefully, and ask your doctor or other care provider to review them with you. Prescriptions Sent to Pharmacy Refills SITagliptin (JANUVIA) 25 mg tablet 5 Sig: Take 1 Tab by mouth daily. Class: Normal  
 Pharmacy: [facility] [address] [place] [address] Rd, [address] #: [phone] Route: Oral  
  
We Performed the Following AMB POC GLUCOSE BLOOD, BY GLUCOSE MONITORING DEVICE [79540 CPT(R)] AMB POC HEMOGLOBIN A1C [99196 CPT(R)] Follow-up Instructions Return in about 4 months (around 12/16/2018) for HTN, DM, cholesterol. Introducing [facility] HE[facility]! [facility] introduces Autrement (HotelHotel) patient portal. Now you can access parts of your medical record, email your doctor's office, and request medication refills online. 1. In your internet browser, go to [email or web address] 2. Click on the First Time User? Click Here link in the Sign In box. You will see the New Member Sign Up page. 3. Enter your Autrement (HotelHotel) Access Code exactly as it appears below. You will not need to use this code after youve completed the sign-up process. If you do not sign up before the expiration date, you must request a new code. · Autrement (HotelHotel) Access Code: [email or web address] Expires: 11/6/2018 12:16 PM 
 
4. Enter the last four digits of your Social Security Number (xxxx) and Date of Birth (mm/dd/yyyy) as indicated and click Submit. You will be taken to the next sign-up page. 5. Create a Cvgram.met ID. This will be your Autrement (HotelHotel) login ID and cannot be changed, so think of one that is secure and easy to remember. 6. Create a Cvgram.met password. You can change your password at any time. 7. Enter your Password Reset Question and Answer. This can be used at a later time if you forget your password. 8. Enter your e-mail address. You will receive e-mail notification when new information is available in [address] 9. Click Sign Up. You can now view and download portions of your medical record. 10. Click the Download Summary menu link to download a portable copy of your medical information. If you have questions, please visit the Frequently Asked Questions section of the Cellmemore website. Remember, Cellmemore is NOT to be used for urgent needs. For medical emergencies, dial 911. Now available from your iPhone and Android! Please provide this summary of care documentation to your next provider. Your primary care clinician is listed as [facility]. If you have any questions after today's visit, please call [phone].

## 2018-09-11 DIAGNOSIS — G89.29 CHRONIC BILATERAL LOW BACK PAIN, WITH SCIATICA PRESENCE UNSPECIFIED: Primary | ICD-10-CM

## 2018-09-11 DIAGNOSIS — M54.5 CHRONIC BILATERAL LOW BACK PAIN, WITH SCIATICA PRESENCE UNSPECIFIED: Primary | ICD-10-CM

## 2018-09-11 DIAGNOSIS — Z79.899 ENCOUNTER FOR LONG-TERM (CURRENT) USE OF HIGH-RISK MEDICATION: ICD-10-CM

## 2018-09-11 DIAGNOSIS — G89.4 CHRONIC PAIN SYNDROME: ICD-10-CM

## 2018-09-11 RX ORDER — FENTANYL 25 UG/1
1 PATCH TRANSDERMAL
Qty: 10 PATCH | Refills: 0 | Status: SHIPPED | OUTPATIENT
Start: 2018-09-11 | End: 2019-03-12

## 2018-09-11 RX ORDER — MORPHINE SULFATE 30 MG/1
30 TABLET ORAL
Qty: 90 TAB | Refills: 0 | Status: SHIPPED | OUTPATIENT
Start: 2018-09-11 | End: 2018-10-02 | Stop reason: SDUPTHER

## 2018-09-11 NOTE — TELEPHONE ENCOUNTER
Dinah Echeverria has called requesting a refill of their controlled medication, fentanyl and ms ir , for the management of chronic generalized pain. Last office visit date: 08/08/18    Date last  was pulled and reviewed : 09/11/18    Was the patient compliant when the above report was pulled? yes    Analgesia: 60% pain relief noted     Aberrancies: none noted     ADL's: patient completes adl care with assistance     Adverse Reaction: none noted     Provider's last note and plan of care reviewed? yes  Request forwarded to provider for review.

## 2018-09-11 NOTE — TELEPHONE ENCOUNTER
Refill of medication called  [id number] 8:59am    1. Name, verified  2. Medicine needed - morphine  3.  [phone] (has access to speak about MOM)  4. Analgesia - 60% pain relief  5. ADL - family helps her do her daily living but aide come in twice a week to make sure her personal hygiene is done  6.   Adverse reaction to medicine - no

## 2018-09-11 NOTE — TELEPHONE ENCOUNTER
Please call cell number of person that has permission to pickup script when its ready to be issued [phone]

## 2018-10-02 ENCOUNTER — OFFICE VISIT (OUTPATIENT)
Dept: PAIN MANAGEMENT | Age: 83
End: 2018-10-02

## 2018-10-02 VITALS
TEMPERATURE: 98.5 F | WEIGHT: 150 LBS | BODY MASS INDEX: 26.58 KG/M2 | HEART RATE: 76 BPM | RESPIRATION RATE: 14 BRPM | DIASTOLIC BLOOD PRESSURE: 53 MMHG | SYSTOLIC BLOOD PRESSURE: 118 MMHG | HEIGHT: 63 IN

## 2018-10-02 DIAGNOSIS — M54.5 CHRONIC BILATERAL LOW BACK PAIN, WITH SCIATICA PRESENCE UNSPECIFIED: Primary | ICD-10-CM

## 2018-10-02 DIAGNOSIS — G89.29 CHRONIC BILATERAL LOW BACK PAIN, WITH SCIATICA PRESENCE UNSPECIFIED: Primary | ICD-10-CM

## 2018-10-02 DIAGNOSIS — G89.4 CHRONIC PAIN SYNDROME: ICD-10-CM

## 2018-10-02 DIAGNOSIS — Z79.899 ENCOUNTER FOR LONG-TERM (CURRENT) USE OF HIGH-RISK MEDICATION: ICD-10-CM

## 2018-10-02 LAB
ALCOHOL UR POC: NORMAL
AMPHETAMINES UR POC: NEGATIVE
BARBITURATES UR POC: NORMAL
BENZODIAZEPINES UR POC: NEGATIVE
BUPRENORPHINE UR POC: NEGATIVE
CANNABINOIDS UR POC: NEGATIVE
CARISOPRODOL UR POC: NORMAL
COCAINE UR POC: NEGATIVE
FENTANYL UR POC: NORMAL
MDMA/ECSTASY UR POC: NORMAL
METHADONE UR POC: NEGATIVE
METHAMPHETAMINE UR POC: NORMAL
METHYLPHENIDATE UR POC: NORMAL
OPIATES UR POC: NORMAL
OXYCODONE UR POC: NORMAL
PHENCYCLIDINE UR POC: NORMAL
PROPOXYPHENE UR POC: NORMAL
TRAMADOL UR POC: NORMAL
TRICYCLICS UR POC: NORMAL

## 2018-10-02 RX ORDER — FENTANYL 25 UG/1
1 PATCH TRANSDERMAL
Qty: 10 PATCH | Refills: 0 | Status: SHIPPED | OUTPATIENT
Start: 2018-10-27 | End: 2018-11-26

## 2018-10-02 RX ORDER — MORPHINE SULFATE 30 MG/1
30 TABLET ORAL
Qty: 90 TAB | Refills: 0 | Status: SHIPPED | OUTPATIENT
Start: 2018-10-10 | End: 2018-12-21 | Stop reason: SDUPTHER

## 2018-10-02 NOTE — PATIENT INSTRUCTIONS
Learning About Sleeping Well  What does sleeping well mean? Sleeping well means getting enough sleep. How much sleep is enough varies among people. The number of hours you sleep is not as important as how you feel when you wake up. If you do not feel refreshed, you probably need more sleep. Another sign of not getting enough sleep is feeling tired during the day. The average total nightly sleep time is 7½ to 8 hours. Healthy adults may need a little more or a little less than this. Why is getting enough sleep important? Getting enough quality sleep is a basic part of good health. When your sleep suffers, your mood and your thoughts can suffer too. You may find yourself feeling more grumpy or stressed. Not getting enough sleep also can lead to serious problems, including injury, accidents, anxiety, and depression. What might cause poor sleeping? Many things can cause sleep problems, including:  · Stress. Stress can be caused by fear about a single event, such as giving a speech. Or you may have ongoing stress, such as worry about work or school. · Depression, anxiety, and other mental or emotional conditions. · Changes in your sleep habits or surroundings. This includes changes that happen where you sleep, such as noise, light, or sleeping in a different bed. It also includes changes in your sleep pattern, such as having jet lag or working a late shift. · Health problems, such as pain, breathing problems, and restless legs syndrome. · Lack of regular exercise. How can you help yourself? Here are some tips that may help you sleep more soundly and wake up feeling more refreshed. Your sleeping area  · Use your bedroom only for sleeping and sex. A bit of light reading may help you fall asleep. But if it doesn't, do your reading elsewhere in the house. Don't watch TV in bed. · Be sure your bed is big enough to stretch out comfortably, especially if you have a sleep partner.   · Keep your bedroom quiet, dark, and cool. Use curtains, blinds, or a sleep mask to block out light. To block out noise, use earplugs, soothing music, or a \"white noise\" machine. Your evening and bedtime routine  · Create a relaxing bedtime routine. You might want to take a warm shower or bath, listen to soothing music, or drink a cup of noncaffeinated tea. · Go to bed at the same time every night. And get up at the same time every morning, even if you feel tired. What to avoid  · Limit caffeine (coffee, tea, caffeinated sodas) during the day, and don't have any for at least 4 to 6 hours before bedtime. · Don't drink alcohol before bedtime. Alcohol can cause you to wake up more often during the night. · Don't smoke or use tobacco, especially in the evening. Nicotine can keep you awake. · Don't take naps during the day, especially close to bedtime. · Don't lie in bed awake for too long. If you can't fall asleep, or if you wake up in the middle of the night and can't get back to sleep within 15 minutes or so, get out of bed and go to another room until you feel sleepy. · Don't take medicine right before bed that may keep you awake or make you feel hyper or energized. Your doctor can tell you if your medicine may do this and if you can take it earlier in the day. If you can't sleep  · Imagine yourself in a peaceful, pleasant scene. Focus on the details and feelings of being in a place that is relaxing. · Get up and do a quiet or boring activity until you feel sleepy. · Don't drink any liquids after 6 p.m. if you wake up often because you have to go to the bathroom. Where can you learn more? Go to http://ayse-fernandez.info/. Enter U347 in the search box to learn more about \"Learning About Sleeping Well. \"  Current as of: December 7, 2017  Content Version: 11.7  © 0055-1270 [employer], [facility].  Care instructions adapted under license by [employer] (which disclaims liability or warranty for this information). If you have questions about a medical condition or this instruction, always ask your healthcare professional. [address] any warranty or liability for your use of this information. Safe Use of Opioid Pain Medicine: Care Instructions  Your Care Instructions  Pain is your body's way of warning you that something is wrong. Pain feels different for everybody. Only you can describe your pain. A doctor can suggest or prescribe many types of medicines for pain. These range from over-the-counter medicines like acetaminophen (Tylenol) to powerful medicines called opioids. Examples of opioids are fentanyl, hydrocodone, morphine, and oxycodone. Heroin is an illegal opioid  Opioids are strong medicines. They can help you manage pain when you use them the right way. But if you misuse them, they can cause serious harm and even death. For these reasons, doctors are very careful about how they prescribe opioids. If you decide to take opioids, here are some things to remember. · Keep your doctor informed. You can get addicted to opioids. The risk is higher if you have a history of substance use. Your doctor will monitor you closely for signs of misuse and addiction and to figure out when you no longer need to take opioids. · Make a treatment plan. The goal of your plan is to be able to function and do the things you need to do, even if you still have some pain. You might be able to manage your pain with other non-opioid options like physical therapy, relaxation, or over-the-counter pain medicines. · Be aware of the side effects. Opioids can cause serious side effects, such as constipation, dry mouth, and nausea. And over time, you may need a higher dose to get pain relief. This is called tolerance. Your body also gets used to opioids. This is called physical dependence. If you suddenly stop taking them, you may have withdrawal symptoms.   The doctor carefully considered what pain medicine is right for you. You may not have received opioids if your doctor was concerned about drug interactions or your safety, or if he or she had other concerns. It is best to have one doctor or clinic treat your pain. This way you will get the pain medicine that will help you the most. And a doctor will be able to watch for any problems that the medicine might cause. The doctor has checked you carefully, but problems can develop later. If you notice any problems or new symptoms,  get medical treatment right away. Follow-up care is a key part of your treatment and safety. Be sure to make and go to all appointments, and call your doctor if you are having problems. It's also a good idea to know your test results and keep a list of the medicines you take. How can you care for yourself at home? · If you need to take opioids to manage your pain, remember these safety tips. ¨ Follow directions carefully. It's easy to misuse opioids if you take a dose other than what's prescribed by your doctor. This can lead to overdose and even death. Even sharing them with someone they weren't meant for is misuse. ¨ Be cautious. Opioids may affect your judgment and decision making. Do not drive or operate machinery until you can think clearly. Talk with your doctor about when it is safe to drive. ¨ Reduce the risk of drug interactions. Opioids can be dangerous if you take them with alcohol or with certain drugs like sleeping pills and muscle relaxers. Make sure your doctor knows about all the other medicines you take, including over-the-counter medicines. Don't start any new medicines before you talk to your doctor or pharmacist.  Luis Soni Keep others safe. Store opioids in a safe and secure place. Make sure that pets, children, friends, and family can't get to them. When you're done using opioids, make sure to properly dispose of them.  You can either use a community drug take-back program or your drugstore's mail-back program. If one of these programs isn't available, you can flush opioid skin patches and unused opioid pills down the toilet. ¨ Reduce the risk of overdose. Misuse of opioids can be very dangerous. Protect yourself by asking your doctor about a naloxone rescue kit. It can help you-and even save your life-if you take too much of an opioid. · Try other ways to reduce pain. ¨ Relax, and reduce stress. Relaxation techniques such as deep breathing or meditation can help. ¨ Keep moving. Gentle, daily exercise can help reduce pain over the long run. Try low- or no-impact exercises such as walking, swimming, and stationary biking. Do stretches to stay flexible. ¨ Try heat, cold packs, and massage. ¨ Get enough sleep. Pain can make you tired and drain your energy. Talk with your doctor if you have trouble sleeping because of pain. ¨ Think positive. Your thoughts can affect your pain level. Do things that you enjoy to distract yourself when you have pain instead of focusing on the pain. See a movie, read a book, listen to music, or spend time with a friend. · If you are not taking a prescription pain medicine, ask your doctor if you can take an over-the-counter medicine. When should you call for help? Call your doctor now or seek immediate medical care if:    · You have a new kind of pain.     · You have new symptoms, such as a fever or rash, along with the pain.    Watch closely for changes in your health, and be sure to contact your doctor if:    · You think you might be using too much pain medicine, and you need help to use less or stop.     · Your pain gets worse.     · You would like a referral to a doctor or clinic that specializes in pain management. Where can you learn more? Go to http://ayse-fernandez.info/. Enter R108 in the search box to learn more about \"Safe Use of Opioid Pain Medicine: Care Instructions. \"  Current as of: September 10, 2017  Content Version: 11.7  © 1423-1563 [employer]. Care instructions adapted under license by [employer] (which disclaims liability or warranty for this information). If you have questions about a medical condition or this instruction, always ask your healthcare professional. [address] any warranty or liability for your use of this information.

## 2018-10-02 NOTE — MR AVS SNAPSHOT
[address] [facility] [address] 
[phone] Patient: Jackelyn Carranza MRN: [id number] Visit Information Date & Time Provider Department Dept. Phone Encounter #  
 10/2/2018 11:00 AM Murali Lund NP [address] [facility] [phone] [id number] Follow-up Instructions Return in about 3 months (around 2019). Your Appointments 2018 11:00 AM  
Office Visit with Yana Graf MD  
[employer] [address] [facility]) Appt Note: 4 mo f/u for htn, dm, cholesterol [address]  
  
   
 [address] [facility] Upcoming Health Maintenance Date Due Shingrix Vaccine Age 50> (1 of 2) 1976 Influenza Age 5 to Adult 2018 MICROALBUMIN Q1 2018* LIPID PANEL Q1 2021* EYE EXAM RETINAL OR DILATED Q1 2019 HEMOGLOBIN A1C Q6M 2019 MEDICARE YEARLY EXAM 2019 GLAUCOMA SCREENING Q2Y 2020 DTaP/Tdap/Td series (2 - Td) 2022 *Topic was postponed. The date shown is not the original due date. Allergies as of 10/2/2018  Review Complete On: 10/2/2018 By: Murali Lund NP Severity Noted Reaction Type Reactions Aspirin    Nausea Only, Unknown (comments) Other reaction(s): gi distress Current Immunizations  Reviewed on 10/6/2015 Name Date Influenza High Dose Vaccine PF 2017, 10/19/2016, 10/6/2015 12:25 PM  
 Influenza Vaccine 10/15/2017 12:00 AM, 10/4/2016 12:00 AM, 2014 Influenza Vaccine Split 10/25/2012  1:24 PM  
 Pneumococcal Conjugate (PCV-13) 10/19/2016  3:50 PM  
 Pneumococcal Polysaccharide (PPSV-23) 2016 12:00 AM  
 TDAP Vaccine 2012 ZZZ-RETIRED (DO NOT USE) Pneumococcal Vaccine (Unspecified Type) 2009 Not reviewed this visit You Were Diagnosed With   
  
 Codes Comments Chronic bilateral low back pain, with sciatica presence unspecified    -  Primary ICD-10-CM: M54.5, G89.29 ICD-9-CM: 724.2, 338.29 Encounter for long-term (current) use of high-risk medication     ICD-10-CM: Z79.899 ICD-9-CM: V58.69 Chronic pain syndrome     ICD-10-CM: G89.4 ICD-9-CM: 338. 4 Vitals BP Pulse Temp Resp Height(growth percentile) Weight(growth percentile) 118/53 (BP 1 Location: Right arm, BP Patient Position: Sitting) 76 98.5 °F (36.9 °C) (Oral) 14 5' 3\" (1.6 m) 150 lb (68 kg) BMI OB Status Smoking Status 26.57 kg/m2 Postmenopausal Never Smoker Vitals History BMI and BSA Data Body Mass Index Body Surface Area  
 26.57 kg/m 2 1.74 m 2 Preferred Pharmacy Pharmacy Name Phone [place] [address] [address], [address] [phone] Your Updated Medication List  
  
   
This list is accurate as of 10/2/18 12:15 PM.  Always use your most recent med list. amLODIPine 10 mg tablet Commonly known as:  Jerald Webb TAKE 1 TABLET BY MOUTH EVERY DAY Blood-Glucose Meter monitoring kit Commonly known as:  Bernardo Meyers Use to test blood sugar daily or as directed  
  
 cromolyn 4 % ophthalmic solution Commonly known as:  OPTICROM Administer 1 Drop to both eyes. diclofenac 3 % topical gel Commonly known as:  Justus Perez Apply  to affected area two (2) times daily as needed for Pain.  
  
 ergocalciferol 50,000 unit capsule Commonly known as:  ERGOCALCIFEROL  
TAKE 1 CAPSULE BY MOUTH EVERY 7 DAYS  
  
 * fentaNYL 25 mcg/hr PATCH Commonly known as:  DURAGESIC  
1 Patch by TransDERmal route every seventy-two (72) hours. Max Daily Amount: 1 Patch. Indications: Chronic Pain with Opioid Tolerance, SEVERE PAIN WITH OPIOID TOLERANCE  
  
 * fentaNYL 25 mcg/hr PATCH Commonly known as:  Kari Agrawal  
 1 Patch by TransDERmal route every seventy-two (72) hours for 30 days. Max Daily Amount: 1 Patch. Start taking on:  10/27/2018  
  
 furosemide 40 mg tablet Commonly known as:  LASIX Take 1 Tab by mouth daily. Lancets Misc Commonly known as:  ONETOUCH ULTRASOFT LANCETS Use to test blood sugar daily or as directed  
  
 lidocaine 5 % ointment Commonly known as:  XYLOCAINE  
USE DAILY AS NEEDED FOR DRESSING CHANGES  
  
 morphine IR 30 mg tablet Commonly known as:  MS IR Take 1 Tab by mouth three (3) times daily as needed for Pain. Max Daily Amount: 90 mg. Start taking on:  10/10/2018  
  
 mupirocin calcium 2 % topical cream  
Commonly known as:  [employer] Apply  to affected area two (2) times a day. * naloxone 4 mg/actuation nasal spray Commonly known as:  NARCAN  
4 mg by Nasal route as needed. * naloxone 4 mg/actuation nasal spray Commonly known as:  [employer] Use 1 spray intranasally into 1 nostril as needed for opioid respiratory emergency only. Indications: OPIATE-INDUCED RESPIRATORY DEPRESSION  
  
 omeprazole 40 mg capsule Commonly known as:  PRILOSEC  
TAKE ONE CAPSULE BY MOUTH DAILY  
  
 ONETOUCH ULTRA TEST strip Generic drug:  glucose blood VI test strips TEST BLOOD SUGAR DAILY OR AS DIRECTED  
  
 potassium chloride 20 mEq tablet Commonly known as:  K-DUR, KLOR-CON  
TAKE 1 TABLET BY MOUTH TWICE DAILY  
  
 rivastigmine 4.6 mg/24 hr patch Commonly known as:  EXELON  
APPLY 1 PATCH TRANSDERMALLY EVERY DAY SITagliptin 25 mg tablet Commonly known as:  Keene Began Take 1 Tab by mouth daily. tolterodine ER 4 mg ER capsule Commonly known as:  DETROL LA  
TAKE ONE CAPSULE BY MOUTH DAILY  
  
 ursodiol 300 mg capsule Commonly known as:  ACTIGALL TAKE 1 CAPSULE BY MOUTH TWICE DAILY  
  
 valsartan-hydroCHLOROthiazide 320-25 mg per tablet Commonly known as:  DIOVAN-HCT  
TAKE 1 TABLET BY MOUTH DAILY * Notice: This list has 4 medication(s) that are the same as other medications prescribed for you. Read the directions carefully, and ask your doctor or other care provider to review them with you. Prescriptions Printed Refills  
 fentaNYL (DURAGESIC) 25 mcg/hr PATCH 0 Starting on: 10/27/2018 Si Patch by TransDERmal route every seventy-two (72) hours for 30 days. Max Daily Amount: 1 Patch. Class: Print Route: TransDERmal  
 morphine IR (MS IR) 30 mg tablet 0 Starting on: 10/10/2018 Sig: Take 1 Tab by mouth three (3) times daily as needed for Pain. Max Daily Amount: 90 mg.  
 Class: Print Route: Oral  
  
We Performed the Following AMB POC DRUG SCREEN () [ [facility]] DRUG SCREEN [[id number] Custom] REFERRAL TO PHYSICAL THERAPY [FMJ98 Custom] Comments:  
 79 y/o female with hx of chronic back and bilateral lower extremity pain. Please evaluate and treat with movement therapies to help patient overcome chronic pain. Follow-up Instructions Return in about 3 months (around 2019). Referral Information Referral ID Referred By Referred To  
  
 [id number] Mellissa Rosado V Not Available Visits Status Start Date End Date 1 New Request 10/2/18 10/2/19 If your referral has a status of pending review or denied, additional information will be sent to support the outcome of this decision. Patient Instructions Learning About Sleeping Well What does sleeping well mean? Sleeping well means getting enough sleep. How much sleep is enough varies among people. The number of hours you sleep is not as important as how you feel when you wake up. If you do not feel refreshed, you probably need more sleep. Another sign of not getting enough sleep is feeling tired during the day. The average total nightly sleep time is 7½ to 8 hours. Healthy adults may need a little more or a little less than this. Why is getting enough sleep important? Getting enough quality sleep is a basic part of good health. When your sleep suffers, your mood and your thoughts can suffer too. You may find yourself feeling more grumpy or stressed. Not getting enough sleep also can lead to serious problems, including injury, accidents, anxiety, and depression. What might cause poor sleeping? Many things can cause sleep problems, including: · Stress. Stress can be caused by fear about a single event, such as giving a speech. Or you may have ongoing stress, such as worry about work or school. · Depression, anxiety, and other mental or emotional conditions. · Changes in your sleep habits or surroundings. This includes changes that happen where you sleep, such as noise, light, or sleeping in a different bed. It also includes changes in your sleep pattern, such as having jet lag or working a late shift. · Health problems, such as pain, breathing problems, and restless legs syndrome. · Lack of regular exercise. How can you help yourself? Here are some tips that may help you sleep more soundly and wake up feeling more refreshed. Your sleeping area · Use your bedroom only for sleeping and sex. A bit of light reading may help you fall asleep. But if it doesn't, do your reading elsewhere in the house. Don't watch TV in bed. · Be sure your bed is big enough to stretch out comfortably, especially if you have a sleep partner. · Keep your bedroom quiet, dark, and cool. Use curtains, blinds, or a sleep mask to block out light. To block out noise, use earplugs, soothing music, or a \"white noise\" machine. Your evening and bedtime routine · Create a relaxing bedtime routine. You might want to take a warm shower or bath, listen to soothing music, or drink a cup of noncaffeinated tea. · Go to bed at the same time every night. And get up at the same time every morning, even if you feel tired. What to avoid · Limit caffeine (coffee, tea, caffeinated sodas) during the day, and don't have any for at least 4 to 6 hours before bedtime. · Don't drink alcohol before bedtime. Alcohol can cause you to wake up more often during the night. · Don't smoke or use tobacco, especially in the evening. Nicotine can keep you awake. · Don't take naps during the day, especially close to bedtime. · Don't lie in bed awake for too long. If you can't fall asleep, or if you wake up in the middle of the night and can't get back to sleep within 15 minutes or so, get out of bed and go to another room until you feel sleepy. · Don't take medicine right before bed that may keep you awake or make you feel hyper or energized. Your doctor can tell you if your medicine may do this and if you can take it earlier in the day. If you can't sleep · Imagine yourself in a peaceful, pleasant scene. Focus on the details and feelings of being in a place that is relaxing. · Get up and do a quiet or boring activity until you feel sleepy. · Don't drink any liquids after 6 p.m. if you wake up often because you have to go to the bathroom. Where can you learn more? Go to http://ayse-fernandez.info/. Enter C291 in the search box to learn more about \"Learning About Sleeping Well. \" Current as of: December 7, 2017 Content Version: 11.7 © 5206-4756 [employer]. Care instructions adapted under license by [employer] (which disclaims liability or warranty for this information). If you have questions about a medical condition or this instruction, always ask your healthcare professional. [place] [address] any warranty or liability for your use of this information. Safe Use of Opioid Pain Medicine: Care Instructions Your Care Instructions Pain is your body's way of warning you that something is wrong. Pain feels different for everybody. Only you can describe your pain. A doctor can suggest or prescribe many types of medicines for pain. These range from over-the-counter medicines like acetaminophen (Tylenol) to powerful medicines called opioids. Examples of opioids are fentanyl, hydrocodone, morphine, and oxycodone. Heroin is an illegal opioid Opioids are strong medicines. They can help you manage pain when you use them the right way. But if you misuse them, they can cause serious harm and even death. For these reasons, doctors are very careful about how they prescribe opioids. If you decide to take opioids, here are some things to remember. · Keep your doctor informed. You can get addicted to opioids. The risk is higher if you have a history of substance use. Your doctor will monitor you closely for signs of misuse and addiction and to figure out when you no longer need to take opioids. · Make a treatment plan. The goal of your plan is to be able to function and do the things you need to do, even if you still have some pain. You might be able to manage your pain with other non-opioid options like physical therapy, relaxation, or over-the-counter pain medicines. · Be aware of the side effects. Opioids can cause serious side effects, such as constipation, dry mouth, and nausea. And over time, you may need a higher dose to get pain relief. This is called tolerance. Your body also gets used to opioids. This is called physical dependence. If you suddenly stop taking them, you may have withdrawal symptoms. The doctor carefully considered what pain medicine is right for you. You may not have received opioids if your doctor was concerned about drug interactions or your safety, or if he or she had other concerns. It is best to have one doctor or clinic treat your pain. This way you will get the pain medicine that will help you the most. And a doctor will be able to watch for any problems that the medicine might cause. The doctor has checked you carefully, but problems can develop later. If you notice any problems or new symptoms,  get medical treatment right away. Follow-up care is a key part of your treatment and safety. Be sure to make and go to all appointments, and call your doctor if you are having problems. It's also a good idea to know your test results and keep a list of the medicines you take. How can you care for yourself at home? · If you need to take opioids to manage your pain, remember these safety tips. ¨ Follow directions carefully. It's easy to misuse opioids if you take a dose other than what's prescribed by your doctor. This can lead to overdose and even death. Even sharing them with someone they weren't meant for is misuse. ¨ Be cautious. Opioids may affect your judgment and decision making. Do not drive or operate machinery until you can think clearly. Talk with your doctor about when it is safe to drive. ¨ Reduce the risk of drug interactions. Opioids can be dangerous if you take them with alcohol or with certain drugs like sleeping pills and muscle relaxers. Make sure your doctor knows about all the other medicines you take, including over-the-counter medicines. Don't start any new medicines before you talk to your doctor or pharmacist. 
Simon Cordova Keep others safe. Store opioids in a safe and secure place. Make sure that pets, children, friends, and family can't get to them. When you're done using opioids, make sure to properly dispose of them. You can either use a community drug take-back program or your drugstore's mail-back program. If one of these programs isn't available, you can flush opioid skin patches and unused opioid pills down the toilet. ¨ Reduce the risk of overdose. Misuse of opioids can be very dangerous. Protect yourself by asking your doctor about a naloxone rescue kit. It can help you-and even save your life-if you take too much of an opioid. · Try other ways to reduce pain. ¨ Relax, and reduce stress. Relaxation techniques such as deep breathing or meditation can help. ¨ Keep moving. Gentle, daily exercise can help reduce pain over the long run. Try low- or no-impact exercises such as walking, swimming, and stationary biking. Do stretches to stay flexible. ¨ Try heat, cold packs, and massage. ¨ Get enough sleep. Pain can make you tired and drain your energy. Talk with your doctor if you have trouble sleeping because of pain. ¨ Think positive. Your thoughts can affect your pain level. Do things that you enjoy to distract yourself when you have pain instead of focusing on the pain. See a movie, read a book, listen to music, or spend time with a friend. · If you are not taking a prescription pain medicine, ask your doctor if you can take an over-the-counter medicine. When should you call for help? Call your doctor now or seek immediate medical care if: 
  · You have a new kind of pain.  
  · You have new symptoms, such as a fever or rash, along with the pain.  
 Watch closely for changes in your health, and be sure to contact your doctor if: 
  · You think you might be using too much pain medicine, and you need help to use less or stop.  
  · Your pain gets worse.  
  · You would like a referral to a doctor or clinic that specializes in pain management. Where can you learn more? Go to http://ayse-fernandez.info/. Enter R108 in the search box to learn more about \"Safe Use of Opioid Pain Medicine: Care Instructions. \" Current as of: September 10, 2017 Content Version: 11.7 © 7701-7208 Healthwise[employer] Inc[employer]. Care instructions adapted under license by [employer] (which disclaims liability or warranty for this information). If you have questions about a medical condition or this instruction, always ask your healthcare professional. [address] any warranty or liability for your use of this information. Introducing \[facility] [place] [facility]\"" & HEALTH SERVICES! [employer] introduces Philtro patient portal. Now you can access parts of your medical record, email your doctor's office, and request medication refills online. 1. In your internet browser, go to [email or web address] 2. Click on the First Time User? Click Here link in the Sign In box. You will see the New Member Sign Up page. 3. Enter your Philtro Access Code exactly as it appears below. You will not need to use this code after youve completed the sign-up process. If you do not sign up before the expiration date, you must request a new code. · Philtro Access Code: [email or web address] Expires: 11/6/2018 12:16 PM 
 
4. Enter the last four digits of your Social Security Number (xxxx) and Date of Birth (mm/dd/yyyy) as indicated and click Submit. You will be taken to the next sign-up page. 5. Create a Philtro ID. This will be your Philtro login ID and cannot be changed, so think of one that is secure and easy to remember. 6. Create a Philtro password. You can change your password at any time. 7. Enter your Password Reset Question and Answer. This can be used at a later time if you forget your password. 8. Enter your e-mail address. You will receive e-mail notification when new information is available in [address] 9. Click Sign Up. You can now view and download portions of your medical record. 10. Click the Download Summary menu link to download a portable copy of your medical information. If you have questions, please visit the Frequently Asked Questions section of the Philtro website. Remember, Philtro is NOT to be used for urgent needs. For medical emergencies, dial 911. Now available from your iPhone and Android! Please provide this summary of care documentation to your next provider. Your primary care clinician is listed as [facility]. If you have any questions after today's visit, please call [phone].

## 2018-10-02 NOTE — PROGRESS NOTES
Referral date before 10/7/11, source PCP and for low back pain, hip pain, leg and foot pain. HPI:  Edwige Mckoy is a 80 y.o. female here for f/u visit for ongoing evaluation of low back pain and bilateral lower extremity pain. Pt was last seen here on 08/08/18. Pt denies interval changes on the character or distribution of pain. Pain is located lower back/gluteal region and bilateral lower extremities to feet. The pain is described as aching. Pain at its best is 3/10. Pain at its worse is 9/10. The pain is worsened by prolonged standing. Symptoms are relieved by sitting, laying down. Pt has tried heat, ice, physical therapy (ten years ago) with no perceived benefit. History of cholangitis with chronic indwelling biliary tube. Patient presents today with her son. Patient denies pain today. Patient has CNA who helps her twice a week. Patient is seeking transferring care to another pain management center.        Social History     Social History    Marital status:      Spouse name: N/A    Number of children: N/A    Years of education: N/A     Occupational History    Not on file.      Social History Main Topics    Smoking status: Never Smoker    Smokeless tobacco: Never Used    Alcohol use No    Drug use: No    Sexual activity: No     Other Topics Concern    Not on file     Social History Narrative     Family History   Problem Relation Age of Onset    Hypertension Mother     Hypertension Father     Stroke Father      Allergies   Allergen Reactions    Aspirin Nausea Only and Unknown (comments)     Other reaction(s): gi distress     Past Medical History:   Diagnosis Date    Abdominal pain, other specified site 10/11    with abnml LFT, no etiology found    Abnormal LFTs     Anemia     Bile duct stone 11/13    Dr. Adame Person of great toe of right foot 3/1/2013    Cholangitis 4/14/16    Chondromalacia of right shoulder 11/6/2014    Colon polyps     CRI (chronic renal insufficiency)     CVA (cerebral infarction)     LACUNAR    DDD (degenerative disc disease), lumbar 11/6/2014    Dementia     Diabetes ([facility] [address]     DJD (degenerative joint disease) of lumbar spine 1994    DJD (degenerative joint disease), multiple sites 11/6/2014    Gallstone pancreatitis     stent placed, June 2012    GERD (gastroesophageal reflux disease)     History of bone density study 1/07    wnl    Hypercholesterolemia     Hypertension     Lumbar arthropathy 11/6/2014    Lumbar nerve root impingement 11/6/2014    Lumbar post-laminectomy syndrome 11/6/2014    Pancolonic diverticulosis     Polyarthralgia 11/6/2014    Post laminectomy syndrome 2002    S/P lumbar spinal fusion 11/6/2014    Sacroiliitis ([facility] [address] 11/6/2014    Spondylolisthesis of lumbar region 11/6/2014    Subscapularis tendinitis of right shoulder 11/6/2014     Past Surgical History:   Procedure Laterality Date    COLONOSCOPY  5/29/12    Dr. Brielle Arvizu, single polyp, 5 yr f/u    HX BUNIONECTOMY      LEFT    HX CARPAL TUNNEL RELEASE  2000    HX CHOLECYSTECTOMY  12/09    GALLSTONE PANCREATITIS    HX LUMBAR LAMINECTOMY  1994    HX TONSIL AND ADENOIDECTOMY       Current Outpatient Prescriptions on File Prior to Visit   Medication Sig    fentaNYL (DURAGESIC) 25 mcg/hr PATCH 1 Patch by TransDERmal route every seventy-two (72) hours. Max Daily Amount: 1 Patch. Indications: Chronic Pain with Opioid Tolerance, SEVERE PAIN WITH OPIOID TOLERANCE    SITagliptin (JANUVIA) 25 mg tablet Take 1 Tab by mouth daily.  naloxone (NARCAN) 4 mg/actuation nasal spray Use 1 spray intranasally into 1 nostril as needed for opioid respiratory emergency only.   Indications: OPIATE-INDUCED RESPIRATORY DEPRESSION    valsartan-hydroCHLOROthiazide (DIOVAN-HCT) 320-25 mg per tablet TAKE 1 TABLET BY MOUTH DAILY    potassium chloride (K-DUR, KLOR-CON) 20 mEq tablet TAKE 1 TABLET BY MOUTH TWICE DAILY    furosemide (LASIX) 40 mg tablet Take 1 Tab by mouth daily.  rivastigmine (EXELON) 4.6 mg/24 hr patch APPLY 1 PATCH TRANSDERMALLY EVERY DAY    amLODIPine (NORVASC) 10 mg tablet TAKE 1 TABLET BY MOUTH EVERY DAY    tolterodine ER (DETROL LA) 4 mg ER capsule TAKE ONE CAPSULE BY MOUTH DAILY    omeprazole (PRILOSEC) 40 mg capsule TAKE ONE CAPSULE BY MOUTH DAILY    ergocalciferol (ERGOCALCIFEROL) 50,000 unit capsule TAKE 1 CAPSULE BY MOUTH EVERY 7 DAYS    lidocaine (XYLOCAINE) 5 % ointment USE DAILY AS NEEDED FOR DRESSING CHANGES    naloxone 4 mg/actuation spry 4 mg by Nasal route as needed.  ONETOUCH ULTRA TEST strip TEST BLOOD SUGAR DAILY OR AS DIRECTED    ursodiol (ACTIGALL) 300 mg capsule TAKE 1 CAPSULE BY MOUTH TWICE DAILY    cromolyn (OPTICROM) 4 % ophthalmic solution Administer 1 Drop to both eyes.  diclofenac sodium (SOLARAZE) 3 % topical gel Apply  to affected area two (2) times daily as needed for Pain.  mupirocin calcium (BACTROBAN) 2 % topical cream Apply  to affected area two (2) times a day.  Blood-Glucose Meter (ONE TOUCH ULTRA 2) monitoring kit Use to test blood sugar daily or as directed    Lancets (ONE TOUCH ULTRASOFT LANCETS) Misc Use to test blood sugar daily or as directed     No current facility-administered medications on file prior to visit. ROS:  Denies fever, chills, nausea, vomiting, diarrhea, constipation, abdominal pain, chest pain, shortness or breath/trouble breathing, weakness, trouble swallowing, changes in vision, changes in hearing, falls, dizziness, bladder incontinence, bowel incontinence, depression, anxiety, suicidal ideations, homicidal ideations or alcohol use. Opioid specific risk: DM, GERD, dementia. Opioid specific history: concurrent use of opioid since before 2011, with no opioid holiday.     Vitals:    10/02/18 1120   BP: 118/53   Pulse: 76   Resp: 14   Temp: 98.5 °F (36.9 °C)   TempSrc: Oral   Weight: 68 kg (150 lb)   Height: 5' 3\" (1.6 m)   PainSc:   7   PainLoc: Back Imaging:  \"\"\"\"\"\" 8/5/11 MRI spine lumbar W and W/O contrast  Impression:  Artifact from spinal fusion hardware limits evaluation of much of the lumbar spine. There are degenerative changes as described. There is no narrowing of the visualized spinal canal.\"\"\"\"\"\"    Labs  BUN/CRE \"\"\"\" 9/19/18   18/1.1\"\"\"\"  AST/ALT  \"\"\"\" 9/19/8     15/8\"\"\"\"    Physical exam:  AFVSS, no acute distress, overweight body habitus. A&OXs 3. Normocephalic, atraumatic. Conjugate gaze, clear sclerae. Speech is clear and appropriate. Mood is appropriate and patient is cooperative. Antalgic gait and decreased obdulia with assistance of a rolling seated walker  Non-labored breathing. Lungs CTA B/L. No wheezing, rales or rhonchi. Heart RRR with S1 and S2 noted. No murmurs. Cervical, thoracic, lumbar, bilateral SIJ, bilateral piriformis, bilateral GT not tender to palpation  LE:   Strength for right lower extremity is 5/5 for hip flexion, knee extension, dorsiflexion, extensor hallucis longus, plantar flexion. Strength for left lower extremity is 5/5 for hip flexion, knee extension, dorsiflexion, extensor hallucis longus, plantar flexion. Sensation to light touch is intact for right L2-S2. Sensation to light touch is intact for left L2-S2. Muscle Stretch reflexes for bilateral lower extremities not tested    Full AROM lumbar flexion 75 % to endrange reproduced primary pain  Full AROM lumbar extension to upright posture caused reproduction of primary pain    Calculated MEQ - 150  Naloxone rescue - ordered has not picked up from pharmacy  Prophylactic bowel program - no  Date of last OCA 12/15/17  Last UDS today, consistent   date checked today, findings consistent    Primary Care Physician  Leigh Salinas [address] [place]  [phone]    Today   Last Visit    Prior visit  ORT -    0    PGIC - 2 and 7 5 and 3    RONDA -38%  42%    COMM -4  4   6    PHQ -- .   PHQ over the last two weeks 10/2/2018   Little interest or pleasure in doing things Not at all   Feeling down, depressed, irritable, or hopeless Not at all   Total Score PHQ 2 0       DSM V-OUD Screen -deferred    Assessment/Plan:     ICD-10-CM ICD-9-CM    1. Chronic bilateral low back pain, with sciatica presence unspecified M54.5 724.2 fentaNYL (DURAGESIC) 25 mcg/hr PATCH    G89.29 338.29 morphine IR (MS IR) 30 mg tablet      REFERRAL TO PHYSICAL THERAPY   2. Encounter for long-term (current) use of high-risk medication Z79.899 V58.69 DRUG SCREEN      AMB POC DRUG SCREEN ()      fentaNYL (DURAGESIC) 25 mcg/hr PATCH      morphine IR (MS IR) 30 mg tablet   3. Chronic pain syndrome G89.4 338.4 fentaNYL (DURAGESIC) 25 mcg/hr PATCH      morphine IR (MS IR) 30 mg tablet      REFERRAL TO PHYSICAL THERAPY      Referral for physical therapy to evaluate and treat with movement therapies to help empower patient overcome chronic pain. Order compound cream to be applied 3-4 times as needed for pain. UDS today    Order Narcan rescue, still at pharmacy     Over-the-counter Tylenol 500 mg tablets take 1-2 tablets up to 2 times a day as needed for pain. max daily dose of 2000 milligrams. Do not recommend long term opioid therapy for this patient at this time. Pt currently taking fentanyl 25 MCG patch 1 patch every 72 hours for pain and morphine IR 30 mg take 1 tablet up to 3 times a day as needed for pain. Their MME is 150. Today, we will continue the weaning slowly of patients opioid medication with a goal of being opioid free, pending safety and compliance. Pt instructed to call if they experience any signs of withdrawal (diarrhea, nausea, vomiting, sweating or chills, agitation, itching). Today, pt given prescription for fentanyl 25 MCG patch 1 patch every 72 hours for pain and morphine IR 30 mg take 1 tablet up to 3 times a day as needed for pain. Their new MME is 150.     Will address short-acting opioid once extended release has been discontinued. Pt instructed to call 7-10 days before they run out of their medications for refill. At this time pt will be provided with fentanyl 12 MCG patch 1 patch every 72 hours for pain and morphine IR 30 mg take 1 tablet up to 3 times a day as needed for pain. pending safety and compliance. At following refill, pt will be provided with fentanyl 12 MCG patch 1 patch every 72 hours for pain and morphine IR 30 mg take 1 tablet up to 3 times a day as needed for pain. pending safety and compliance. At next office visit, the plan is to provide patient with possibly D/C fentanly and morphine IR 30 mg take 1 tablet up to 3 times a day as needed for pain. Their new MME will be 90. If patient has difficulty with the wean or difficulty with cravings we will consider referral to mental health for ongoing assessment and treatment for opioid use disorder. Consider next wave, updated imaging,    Follow up ongoing assessment and ongoing development of integrative and comprehensive plan of care for chronic pain. Goals: To establish complementary and integrative plan of care to address chronic pain issues while minimizing pharmaceuticals to maximize patient's function improve quality of life. Education:  Patient again educated on the importance of strict compliance with the opioid care agreement while on opioid therapy. Patient also again educated that they should avoid driving while on chronic opioid therapy. Also advised to avoid alcohol and to avoid benzodiazepines while on opioid therapy. Handouts given regarding opioid safety and sleep health. Follow-up Disposition:  Return in about 3 months (around 1/2/2019). [address] was used for portions of this report. Unintended errors may occur.

## 2018-10-02 NOTE — PROGRESS NOTES
Nursing Notes    Patient presents to the office today in follow-up. Patient rates her pain at 7/10 on the numerical pain scale. Reviewed medications with counts as follows:    Rx Date filled Qty Dispensed Pill Count Last Dose Short   Fentanyl 25 mcg 9/29/18 10 9+1 on yesterday no   Morphine 30 mg 9/12/18 90 56 today no                                Last opioid agreement 11/30/17, today   Last urine drug screen 4/30/18, today  PHQ over the last two weeks 10/2/2018   Little interest or pleasure in doing things Not at all   Feeling down, depressed, irritable, or hopeless Not at all   Total Score PHQ 2 0       Comments:     POC UDS was performed in office today    Any new labs or imaging since last appointment? YES, lab    Have you been to an emergency room (ER) or urgent care clinic since your last visit? NO            Have you been hospitalized since your last visit? NO     If yes, where, when, and reason for visit? Have you seen or consulted any other health care providers outside of the [address] [place] [address]  since your last visit? YES     If yes, where, when, and reason for visit? Ms. Anthony Montague has a reminder for a \"due or due soon\" health maintenance. I have asked that she contact her primary care provider for follow-up on this health maintenance.

## 2018-12-17 ENCOUNTER — TELEPHONE (OUTPATIENT)
Dept: INTERNAL MEDICINE CLINIC | Age: 83
End: 2018-12-17

## 2018-12-17 ENCOUNTER — OFFICE VISIT (OUTPATIENT)
Dept: INTERNAL MEDICINE CLINIC | Age: 83
End: 2018-12-17

## 2018-12-17 VITALS
BODY MASS INDEX: 26.93 KG/M2 | RESPIRATION RATE: 14 BRPM | WEIGHT: 152 LBS | DIASTOLIC BLOOD PRESSURE: 76 MMHG | HEART RATE: 83 BPM | TEMPERATURE: 99.3 F | SYSTOLIC BLOOD PRESSURE: 128 MMHG | HEIGHT: 63 IN | OXYGEN SATURATION: 95 %

## 2018-12-17 DIAGNOSIS — Z79.899 ENCOUNTER FOR LONG-TERM (CURRENT) USE OF HIGH-RISK MEDICATION: ICD-10-CM

## 2018-12-17 DIAGNOSIS — G89.29 CHRONIC BILATERAL LOW BACK PAIN, WITH SCIATICA PRESENCE UNSPECIFIED: ICD-10-CM

## 2018-12-17 DIAGNOSIS — M54.5 CHRONIC BILATERAL LOW BACK PAIN, WITH SCIATICA PRESENCE UNSPECIFIED: ICD-10-CM

## 2018-12-17 DIAGNOSIS — Z23 ENCOUNTER FOR IMMUNIZATION: ICD-10-CM

## 2018-12-17 DIAGNOSIS — H61.23 CERUMEN DEBRIS ON TYMPANIC MEMBRANE OF BOTH EARS: ICD-10-CM

## 2018-12-17 DIAGNOSIS — H91.93 DECREASED HEARING OF BOTH EARS: ICD-10-CM

## 2018-12-17 DIAGNOSIS — I10 ESSENTIAL HYPERTENSION: Primary | ICD-10-CM

## 2018-12-17 DIAGNOSIS — E78.00 HYPERCHOLESTEREMIA: ICD-10-CM

## 2018-12-17 DIAGNOSIS — H61.23 BILATERAL IMPACTED CERUMEN: ICD-10-CM

## 2018-12-17 DIAGNOSIS — E11.9 TYPE 2 DIABETES MELLITUS WITHOUT COMPLICATION, WITHOUT LONG-TERM CURRENT USE OF INSULIN (HCC): ICD-10-CM

## 2018-12-17 DIAGNOSIS — G89.4 CHRONIC PAIN SYNDROME: Primary | ICD-10-CM

## 2018-12-17 NOTE — PROGRESS NOTES
HISTORY OF PRESENT ILLNESS  Eduardo Parker is a 80 y.o. female. Visit Vitals  /76 (BP 1 Location: Left arm, BP Patient Position: Sitting)   Pulse 83   Temp 99.3 °F (37.4 °C) (Oral)   Resp 14   Ht 5' 3\" (1.6 m)   Wt 152 lb (68.9 kg)   SpO2 95%   BMI 26.93 kg/m²       She will be having her biliary drainage tube changed. Radiology exchanges it. Hypertension    The history is provided by the patient. This is a chronic problem. The current episode started more than 1 week ago. The problem has not changed since onset. Pertinent negatives include no chest pain, no headaches, no neck pain and no dizziness. There are no associated agents to hypertension. Diabetes   The history is provided by the patient. This is a chronic problem. The current episode started more than 1 week ago. The problem occurs daily. Pertinent negatives include no chest pain and no headaches. Exacerbated by: diet. The symptoms are relieved by medications (diet). Review of Systems   Constitutional: Negative for chills and fever. Cardiovascular: Negative for chest pain. Musculoskeletal: Positive for falls (has a minor fall over the summer when she misjudged her walker. ). Negative for myalgias and neck pain. Neurological: Negative for dizziness and headaches. Psychiatric/Behavioral: Positive for memory loss (memory impairment about the same). Physical Exam   Constitutional: She appears well-developed and well-nourished. No distress. Cardiovascular: Normal rate and regular rhythm. Pulmonary/Chest: Effort normal and breath sounds normal.   Neurological: She is alert. Skin: Skin is warm and dry. She is not diaphoretic. Psychiatric: She has a normal mood and affect. Nursing note and vitals reviewed. ASSESSMENT and PLAN    ICD-10-CM ICD-9-CM    1. Essential hypertension I10 401.9 LIPID PANEL      METABOLIC PANEL, COMPREHENSIVE   2. Hypercholesteremia E78.00 272.0 LIPID PANEL   3.  Type 2 diabetes mellitus without complication, without long-term current use of insulin (HCC) E11.9 250.00 LIPID PANEL      HEMOGLOBIN A1C W/O EAG   4. Cerumen debris on tympanic membrane of both ears H61.23 380.4    5. Decreased hearing of both ears H91.93 389.9    6. Encounter for immunization Z23 V03.89 INFLUENZA VIRUS VACCINE, QUADRIVALENT (RIV4), DERIVED FROM RECOMBINANT DNA,HEMAGGLUTININ(HA) PROTEIN ONLY, PF ABX FREE      ADMIN INFLUENZA VIRUS VAC   7. Bilateral impacted cerumen H61.23 380.4 REMOVE IMPACTED EAR WAX       BP controlled    BS has been so good may be able to go off [place]    Update lab today    Overall she looks good    F/u 6 months for MWV, HTN,  DM        Subjective:     Mitch Davidson is a 80 y.o. female who presents for evaluation of a plugged ear. She has noticed bilateral symptoms several weeks ago. There is a prior history of cerumen impaction. Patient denies ear pain. Patient has hearing loss. Objective:     Visit Vitals  /76 (BP 1 Location: Left arm, BP Patient Position: Sitting)   Pulse 83   Temp 99.3 °F (37.4 °C) (Oral)   Resp 14   Ht 5' 3\" (1.6 m)   Wt 152 lb (68.9 kg)   SpO2 95%   BMI 26.93 kg/m²       ight Ear: ceruminosis noted. Left Ear:  ceruminosis noted. After removal: bilateral TM's and external ear canals normal, cerumen removed. Oropharynx:   .   Neck:  . Assessment/Plan:     Cerumen Impaction, without otitis externa. 1. Cerumen removed by flushing. 2. Care instructions given. 3. Home treatment: none  4. Followup as needed.

## 2018-12-17 NOTE — PROGRESS NOTES
Patient presents to the clinic for a follow up on her blood sugar and blood pressure. [facility]

## 2018-12-17 NOTE — TELEPHONE ENCOUNTER
Patient son came back in to ask about a blood test about diabetics for the Januvia to test her blood. Please call mikayla Gordon Kirby [phone] ASAP!!

## 2018-12-17 NOTE — TELEPHONE ENCOUNTER
Attempted to contact patient to gather more information needed to process the refill request they called about, but patient did not answer at the number provided. Left v/m for the patient to call the office back. Clinic number provided.

## 2018-12-17 NOTE — TELEPHONE ENCOUNTER
Pt son Ganesh Felix contacted at home number. 2 pt identifiers confirmed. Pt son states that his mother was seen here in office today and after OV did not realized tht MD had ordered labs so they left. He states that he does recall that MD wanted to check pt A1C but he thought it would be a fingerstick so when that did not happen they left. Pt son informed that labs had been entered for pt today. Pt son states he will bring pt back into office on 12/19/2018 for labs.       JAC

## 2018-12-17 NOTE — PROGRESS NOTES
Administered 0.5 mL of high dose influenza immunizations in right deltoid. Patient tolerated well with no signs of a reaction. Patient was given VIS.

## 2018-12-19 ENCOUNTER — HOSPITAL ENCOUNTER (OUTPATIENT)
Dept: LAB | Age: 83
Discharge: HOME | End: 2018-12-19
Payer: MEDICARE

## 2018-12-19 DIAGNOSIS — E11.9 TYPE 2 DIABETES MELLITUS WITHOUT COMPLICATION, WITHOUT LONG-TERM CURRENT USE OF INSULIN (HCC): ICD-10-CM

## 2018-12-19 DIAGNOSIS — E78.00 HYPERCHOLESTEREMIA: ICD-10-CM

## 2018-12-19 DIAGNOSIS — I10 ESSENTIAL HYPERTENSION: ICD-10-CM

## 2018-12-19 LAB
ALBUMIN SERPL-MCNC: 3.6 G/DL (ref 3.4–5)
ALBUMIN/GLOB SERPL: 1 {RATIO} (ref 0.8–1.7)
ALP SERPL-CCNC: 93 U/L (ref 45–117)
ALT SERPL-CCNC: 26 U/L (ref 13–56)
ANION GAP SERPL CALC-SCNC: 8 MMOL/L (ref 3–18)
AST SERPL-CCNC: 26 U/L (ref 15–37)
BILIRUB SERPL-MCNC: 0.3 MG/DL (ref 0.2–1)
BUN SERPL-MCNC: 21 MG/DL (ref 7–18)
BUN/CREAT SERPL: 19 (ref 12–20)
CALCIUM SERPL-MCNC: 8.9 MG/DL (ref 8.5–10.1)
CHLORIDE SERPL-SCNC: 110 MMOL/L (ref 100–108)
CHOLEST SERPL-MCNC: 219 MG/DL
CO2 SERPL-SCNC: 25 MMOL/L (ref 21–32)
CREAT SERPL-MCNC: 1.08 MG/DL (ref 0.6–1.3)
GLOBULIN SER CALC-MCNC: 3.5 G/DL (ref 2–4)
GLUCOSE SERPL-MCNC: 75 MG/DL (ref 74–99)
HBA1C MFR BLD: 5.8 % (ref 4.2–5.6)
HDLC SERPL-MCNC: 77 MG/DL (ref 40–60)
HDLC SERPL: 2.8 {RATIO} (ref 0–5)
LDLC SERPL CALC-MCNC: 116 MG/DL (ref 0–100)
LIPID PROFILE,FLP: ABNORMAL
POTASSIUM SERPL-SCNC: 4.2 MMOL/L (ref 3.5–5.5)
PROT SERPL-MCNC: 7.1 G/DL (ref 6.4–8.2)
SODIUM SERPL-SCNC: 143 MMOL/L (ref 136–145)
TRIGL SERPL-MCNC: 130 MG/DL (ref ?–150)
VLDLC SERPL CALC-MCNC: 26 MG/DL

## 2018-12-19 PROCEDURE — 36415 COLL VENOUS BLD VENIPUNCTURE: CPT

## 2018-12-19 PROCEDURE — 83036 HEMOGLOBIN GLYCOSYLATED A1C: CPT

## 2018-12-19 PROCEDURE — 80053 COMPREHEN METABOLIC PANEL: CPT

## 2018-12-19 PROCEDURE — 80061 LIPID PANEL: CPT

## 2018-12-20 NOTE — TELEPHONE ENCOUNTER
Attempted to contact patient to gather more information needed to process the refill request they called about, but patient did not answer at the number provided. Left v/m for the patient to call the office back. Clinic number provided. Called [phone], [phone] and [phone].

## 2018-12-21 RX ORDER — FENTANYL 12.5 UG/1
1 PATCH TRANSDERMAL
Qty: 10 PATCH | Refills: 0 | Status: SHIPPED | OUTPATIENT
Start: 2019-01-02 | End: 2019-02-01

## 2018-12-21 RX ORDER — MORPHINE SULFATE 30 MG/1
30 TABLET ORAL
Qty: 90 TAB | Refills: 0 | Status: SHIPPED | OUTPATIENT
Start: 2018-12-21 | End: 2019-01-20

## 2018-12-21 NOTE — TELEPHONE ENCOUNTER
Pt's son came in for a prescription refill on 12/21/18. Call pt's with prescription is ready for pick at [phone].       Medication:Morphine & Fentanyl  Relief:80%-90%  Daily Task:Yes, With help from her children and Nurse  Side Effects:No

## 2018-12-21 NOTE — TELEPHONE ENCOUNTER
Patient identified using two patient identifiers (name and ); patient's son(who is on patient's HIPPA list) advised prescriptions are ready for pick-up. Patient's son verbalized understanding and stated they are on their way to pick the prescriptions up.

## 2018-12-21 NOTE — TELEPHONE ENCOUNTER
Eduardo Lopezs has called requesting a refill of their controlled medication, Fentanyl 25 mcg/hr patch and MS IR 30 mg tab, for the management of chronic back and hip pain. Last office visit date: 10/2/18 with Sudarshan and has a f/u appt on 1/7/19 with Bandar Dempsey. Last opioid care agreement 12/15/17  Last UDS was done 10/2/18    Date last  was pulled and reviewed : 12/21/18. Last fill date: 12/4/18(Fentanyl) and 11/2/18(Morph)    Was the patient compliant when the above report was pulled? yes    Analgesia: Per S.W. Patient reports 80-90% pain relief on current regimen. Aberrancies: No aberrancies noted in the last 30 days. ADL's: Per S.W. Patient states they are able to perform ADL's on current regimen with the help of their children. Adverse Reaction: Per S.W. Patient reports no adverse reactions at this time. Provider's last note and plan of care reviewed? yes  Request forwarded to provider for review.

## 2019-01-02 ENCOUNTER — TELEPHONE (OUTPATIENT)
Dept: INTERNAL MEDICINE CLINIC | Age: 84
End: 2019-01-02

## 2019-01-02 NOTE — TELEPHONE ENCOUNTER
My error. I did not include that in her letter. She can stop the Januvia altogether.  Her HBA1c was great

## 2019-01-02 NOTE — TELEPHONE ENCOUNTER
Two pt identifiers verified. Patient's son Laron Dangelo) called and wanted to verify if patient should continue taking the Januvia or lessen the dose.

## 2019-01-21 ENCOUNTER — TELEPHONE (OUTPATIENT)
Dept: INTERNAL MEDICINE CLINIC | Age: 84
End: 2019-01-21

## 2019-01-21 ENCOUNTER — OFFICE VISIT (OUTPATIENT)
Dept: PAIN MANAGEMENT | Age: 84
End: 2019-01-21

## 2019-01-21 VITALS
OXYGEN SATURATION: 97 % | WEIGHT: 152 LBS | HEART RATE: 73 BPM | RESPIRATION RATE: 14 BRPM | BODY MASS INDEX: 26.93 KG/M2 | TEMPERATURE: 97 F | SYSTOLIC BLOOD PRESSURE: 131 MMHG | DIASTOLIC BLOOD PRESSURE: 59 MMHG | HEIGHT: 63 IN

## 2019-01-21 DIAGNOSIS — G89.29 CHRONIC BILATERAL LOW BACK PAIN, WITH SCIATICA PRESENCE UNSPECIFIED: Primary | ICD-10-CM

## 2019-01-21 DIAGNOSIS — J06.9 UPPER RESPIRATORY TRACT INFECTION, UNSPECIFIED TYPE: Primary | ICD-10-CM

## 2019-01-21 DIAGNOSIS — Z79.899 ENCOUNTER FOR LONG-TERM (CURRENT) USE OF HIGH-RISK MEDICATION: ICD-10-CM

## 2019-01-21 DIAGNOSIS — M54.5 CHRONIC BILATERAL LOW BACK PAIN, WITH SCIATICA PRESENCE UNSPECIFIED: Primary | ICD-10-CM

## 2019-01-21 DIAGNOSIS — G89.4 CHRONIC PAIN SYNDROME: ICD-10-CM

## 2019-01-21 RX ORDER — MORPHINE SULFATE 30 MG/1
15 TABLET ORAL
Qty: 90 TAB | Refills: 0 | Status: SHIPPED | OUTPATIENT
Start: 2019-01-28 | End: 2019-03-12

## 2019-01-21 RX ORDER — MORPHINE SULFATE 30 MG/1
30 TABLET ORAL
Qty: 90 TAB | Refills: 0 | Status: SHIPPED | OUTPATIENT
Start: 2019-01-28 | End: 2019-03-12

## 2019-01-21 RX ORDER — AMOXICILLIN 500 MG/1
500 CAPSULE ORAL 3 TIMES DAILY
Qty: 30 CAP | Refills: 0 | Status: SHIPPED | OUTPATIENT
Start: 2019-01-21 | End: 2019-01-31

## 2019-01-21 NOTE — PROGRESS NOTES
Nursing Notes Patient presents to the office today in follow-up. Patient rates her pain at 8/10 on the numerical pain scale. Reviewed medications with counts as follows:   
Rx Date filled Qty Dispensed Pill Count Last Dose Short Fentanyl 25 mcg 
 12/4/48 10 1+1on 1/15/19 no  
Morphine 30 mg 12/21/18 90 27 today no Last opioid agreement 11/30/17  today Last urine drug screen 10/2/18 PHQ over the last two weeks 1/21/2019 Little interest or pleasure in doing things Not at all Feeling down, depressed, irritable, or hopeless Not at all Total Score PHQ 2 0 Comments: POC UDS was not performed in office today Any new labs or imaging since last appointment? YES, lab Have you been to an emergency room (ER) or urgent care clinic since your last visit? NO Have you been hospitalized since your last visit? NO If yes, where, when, and reason for visit? Have you seen or consulted any other health care providers outside of the [address] [place] [address]  since your last visit? YES If yes, where, when, and reason for visit? Ms. Faraz Ramos has a reminder for a \"due or due soon\" health maintenance. I have asked that she contact her primary care provider for follow-up on this health maintenance.

## 2019-01-21 NOTE — TELEPHONE ENCOUNTER
I have sent over some amoxicillin. Bringing in this patient will not likely .  Her son has been a very reliable caretaker and she is a high risk

## 2019-01-21 NOTE — PATIENT INSTRUCTIONS
Learning About Sleeping Well What does sleeping well mean? Sleeping well means getting enough sleep. How much sleep is enough varies among people. The number of hours you sleep is not as important as how you feel when you wake up. If you do not feel refreshed, you probably need more sleep. Another sign of not getting enough sleep is feeling tired during the day. The average total nightly sleep time is 7½ to 8 hours. Healthy adults may need a little more or a little less than this. Why is getting enough sleep important? Getting enough quality sleep is a basic part of good health. When your sleep suffers, your mood and your thoughts can suffer too. You may find yourself feeling more grumpy or stressed. Not getting enough sleep also can lead to serious problems, including injury, accidents, anxiety, and depression. What might cause poor sleeping? Many things can cause sleep problems, including: · Stress. Stress can be caused by fear about a single event, such as giving a speech. Or you may have ongoing stress, such as worry about work or school. · Depression, anxiety, and other mental or emotional conditions. · Changes in your sleep habits or surroundings. This includes changes that happen where you sleep, such as noise, light, or sleeping in a different bed. It also includes changes in your sleep pattern, such as having jet lag or working a late shift. · Health problems, such as pain, breathing problems, and restless legs syndrome. · Lack of regular exercise. How can you help yourself? Here are some tips that may help you sleep more soundly and wake up feeling more refreshed. Your sleeping area · Use your bedroom only for sleeping and sex. A bit of light reading may help you fall asleep. But if it doesn't, do your reading elsewhere in the house. Don't watch TV in bed. · Be sure your bed is big enough to stretch out comfortably, especially if you have a sleep partner. · Keep your bedroom quiet, dark, and cool. Use curtains, blinds, or a sleep mask to block out light. To block out noise, use earplugs, soothing music, or a \"white noise\" machine. Your evening and bedtime routine · Create a relaxing bedtime routine. You might want to take a warm shower or bath, listen to soothing music, or drink a cup of noncaffeinated tea. · Go to bed at the same time every night. And get up at the same time every morning, even if you feel tired. What to avoid · Limit caffeine (coffee, tea, caffeinated sodas) during the day, and don't have any for at least 4 to 6 hours before bedtime. · Don't drink alcohol before bedtime. Alcohol can cause you to wake up more often during the night. · Don't smoke or use tobacco, especially in the evening. Nicotine can keep you awake. · Don't take naps during the day, especially close to bedtime. · Don't lie in bed awake for too long. If you can't fall asleep, or if you wake up in the middle of the night and can't get back to sleep within 15 minutes or so, get out of bed and go to another room until you feel sleepy. · Don't take medicine right before bed that may keep you awake or make you feel hyper or energized. Your doctor can tell you if your medicine may do this and if you can take it earlier in the day. If you can't sleep · Imagine yourself in a peaceful, pleasant scene. Focus on the details and feelings of being in a place that is relaxing. · Get up and do a quiet or boring activity until you feel sleepy. · Don't drink any liquids after 6 p.m. if you wake up often because you have to go to the bathroom. Where can you learn more? Go to http://ayse-fernandez.info/. Enter F112 in the search box to learn more about \"Learning About Sleeping Well. \" Current as of: September 11, 2018 Content Version: 11.9 © 7080-0847 [employer].  Care instructions adapted under license by [address] (which disclaims liability or warranty for this information). If you have questions about a medical condition or this instruction, always ask your healthcare professional. [address] any warranty or liability for your use of this information. Safe Use of Opioid Pain Medicine: Care Instructions Your Care Instructions Pain is your body's way of warning you that something is wrong. Pain feels different for everybody. Only you can describe your pain. A doctor can suggest or prescribe many types of medicines for pain. These range from over-the-counter medicines like acetaminophen (Tylenol) to powerful medicines called opioids. Examples of opioids are fentanyl, hydrocodone, morphine, and oxycodone. Heroin is an illegal opioid Opioids are strong medicines. They can help you manage pain when you use them the right way. But if you misuse them, they can cause serious harm and even death. For these reasons, doctors are very careful about how they prescribe opioids. If you decide to take opioids, here are some things to remember. · Keep your doctor informed. You can get addicted to opioids. The risk is higher if you have a history of substance use. Your doctor will monitor you closely for signs of misuse and addiction and to figure out when you no longer need to take opioids. · Make a treatment plan. The goal of your plan is to be able to function and do the things you need to do, even if you still have some pain. You might be able to manage your pain with other non-opioid options like physical therapy, relaxation, or over-the-counter pain medicines. · Be aware of the side effects. Opioids can cause serious side effects, such as constipation, dry mouth, and nausea. And over time, you may need a higher dose to get pain relief. This is called tolerance. Your body also gets used to opioids. This is called physical dependence.  If you suddenly stop taking them, you may have withdrawal symptoms. The doctor carefully considered what pain medicine is right for you. You may not have received opioids if your doctor was concerned about drug interactions or your safety, or if he or she had other concerns. It is best to have one doctor or clinic treat your pain. This way you will get the pain medicine that will help you the most. And a doctor will be able to watch for any problems that the medicine might cause. The doctor has checked you carefully, but problems can develop later. If you notice any problems or new symptoms,  get medical treatment right away. Follow-up care is a key part of your treatment and safety. Be sure to make and go to all appointments, and call your doctor if you are having problems. It's also a good idea to know your test results and keep a list of the medicines you take. How can you care for yourself at home? If you need to take opioids to manage your pain, remember these safety tips. · Follow directions carefully. It's easy to misuse opioids if you take a dose other than what's prescribed by your doctor. This can lead to overdose and even death. Even sharing them with someone they weren't meant for is misuse. · Be cautious. Opioids may affect your judgment and decision making. Do not drive or operate machinery until you can think clearly. Talk with your doctor about when it is safe to drive. · Reduce the risk of drug interactions. Opioids can be dangerous if you take them with alcohol or with certain drugs like sleeping pills and muscle relaxers. Make sure your doctor knows about all the other medicines you take, including over-the-counter medicines. Don't start any new medicines before you talk to your doctor or pharmacist. 
· Safely store and dispose of opioids. Store opioids in a safe and secure place.  Make sure that pets, children, friends, and family can't get to them. When you're done using opioids, make sure to dispose of them safely and as quickly as possible. The U.S. Food and Drug Administration (FDA) recommends these disposal options. ? The best option is to take your medicine to a drop-off box or take-back program that is authorized by the [facility] [address] ([facility]). ? If these programs aren't available in your area and your medicine doesn't have specific disposal instructions (such as flushing), you can throw them into your household trash if you follow the FDA's instructions. Visit fda.gov and search for \"unused medicine disposal.\" 
? If you have opioid patches (used or unused), your options are to take them to a [facility]-authorized site or flush them down the toilet. Do not throw them in the trash. ? Only flush your medicine down the toilet if you can't get to a [facility]ANN-approved site or your medicine instructions state clearly to flush them. · Reduce the risk of overdose. Misuse of opioids can be very dangerous. Protect yourself by asking your doctor about a naloxone rescue kit. It can help youand even save your lifeif you take too much of an opioid. Try other ways to reduce pain. · Relax, and reduce stress. Relaxation techniques such as deep breathing or meditation can help. · Keep moving. Gentle, daily exercise can help reduce pain over the long run. Try low- or no-impact exercises such as walking, swimming, and stationary biking. Do stretches to stay flexible. · Try heat, cold packs, and massage. · Get enough sleep. Pain can make you tired and drain your energy. Talk with your doctor if you have trouble sleeping because of pain. · Think positive. Your thoughts can affect your pain level. Do things that you enjoy to distract yourself when you have pain instead of focusing on the pain. See a movie, read a book, listen to music, or spend time with a friend.  
If you are not taking a prescription pain medicine, ask your doctor if you can take an over-the-counter medicine. When should you call for help? Call your doctor now or seek immediate medical care if: 
  · You have a new kind of pain.  
  · You have new symptoms, such as a fever or rash, along with the pain.  
 Watch closely for changes in your health, and be sure to contact your doctor if: 
  · You think you might be using too much pain medicine, and you need help to use less or stop.  
  · Your pain gets worse.  
  · You would like a referral to a doctor or clinic that specializes in pain management. Where can you learn more? Go to http://ayse-fernandez.info/. Enter R108 in the search box to learn more about \"Safe Use of Opioid Pain Medicine: Care Instructions. \" Current as of: So 3, 2018 Content Version: 11.9 © 8880-1255 [employer]. Care instructions adapted under license by [email or web address] (which disclaims liability or warranty for this information). If you have questions about a medical condition or this instruction, always ask your healthcare professional. [address] any warranty or liability for your use of this information.

## 2019-01-21 NOTE — TELEPHONE ENCOUNTER
2 pt. Identifiers confirmed. Pt son, Kiran Meng, called in to say that the pt is having cold symptoms that include a productive cough with a clear and yellow phelgm that started around [place]. They have tried mucinex without relief. Wants suggestion if antibiotics would work on come in to be seen.

## 2019-01-21 NOTE — PROGRESS NOTES
Referral date before 10/7/11, source PCP and for low back pain, hip pain, leg and foot pain. HPI: 
Jane Miller is a 80 y.o. female here for f/u visit for ongoing evaluation of low back pain and bilateral lower extremity pain. Pt was last seen here on 10/02/18. Pt denies interval changes on the character or distribution of pain. Pain is located gluteal region and bilateral lower extremities. The pain is described as aching. Pain at its best is 3/10. Pain at its worse is 9/10. The pain is worsened by prolonged standing. Symptoms are improved by sitting, laying down. Pt has tried heat, ice, physical therapy (ten years ago) with no perceived benefit. History of cholangitis with chronic indwelling biliary tube. Patient is accompanied by her son today who lives with her. Patient denies pain today. Patient has CNA who helps her twice a week.  Since last visit, patient denies any changes since last visit. Social History Socioeconomic History  Marital status:  Spouse name: Not on file  Number of children: Not on file  Years of education: Not on file  Highest education level: Not on file Social Needs  Financial resource strain: Not on file  Food insecurity - worry: Not on file  Food insecurity - inability: Not on file  Transportation needs - medical: Not on file  Transportation needs - non-medical: Not on file Occupational History  Not on file Tobacco Use  Smoking status: Never Smoker  Smokeless tobacco: Never Used Substance and Sexual Activity  Alcohol use: No  
 Drug use: No  
 Sexual activity: No  
Other Topics Concern  Not on file Social History Narrative  Not on file Family History Problem Relation Age of Onset  Hypertension Mother  Hypertension Father  Stroke Father Allergies Allergen Reactions  Aspirin Nausea Only and Unknown (comments) Other reaction(s): gi distress Past Medical History: Diagnosis Date  Abdominal pain, other specified site 10/11  
 with abnml LFT, no etiology found  Abnormal LFTs  Anemia  Bile duct stone 11/13 Dr. Salinas Ra  Bunion of great toe of right foot 3/1/2013  Cholangitis 4/14/16  Chondromalacia of right shoulder 11/6/2014  Colon polyps  CRI (chronic renal insufficiency)  CVA (cerebral infarction) LACUNAR  
 DDD (degenerative disc disease), lumbar 11/6/2014  Dementia  Diabetes ([address]  DJD (degenerative joint disease) of lumbar spine 1994  DJD (degenerative joint disease), multiple sites 11/6/2014  Gallstone pancreatitis   
 stent placed, June 2012  GERD (gastroesophageal reflux disease)  History of bone density study 1/07  
 wnl  Hypercholesterolemia  Hypertension  Lumbar arthropathy 11/6/2014  Lumbar nerve root impingement 11/6/2014  Lumbar post-laminectomy syndrome 11/6/2014  Pancolonic diverticulosis  Polyarthralgia 11/6/2014  Post laminectomy syndrome 2002  S/P lumbar spinal fusion 11/6/2014  Sacroiliitis ([address] 11/6/2014  Spondylolisthesis of lumbar region 11/6/2014  Subscapularis tendinitis of right shoulder 11/6/2014 Past Surgical History:  
Procedure Laterality Date  COLONOSCOPY  5/29/12 Dr. Chetna Salmeron, single polyp, 5 yr f/u  
 HX BUNIONECTOMY    
 LEFT  
 [address] RELEASE  2000  HX CHOLECYSTECTOMY  12/09 [address] [place] [address]  HX TONSIL AND ADENOIDECTOMY Current Outpatient Medications on File Prior to Visit Medication Sig  
 amLODIPine (NORVASC) 10 mg tablet TAKE 1 TABLET BY MOUTH EVERY DAY  tolterodine ER (DETROL LA) 4 mg ER capsule TAKE 1 CAPSULE BY MOUTH DAILY  omeprazole (PRILOSEC) 40 mg capsule TAKE 1 CAPSULE BY MOUTH DAILY  ergocalciferol (ERGOCALCIFEROL) 50,000 unit capsule TAKE 1 CAPSULE BY MOUTH EVERY 7 DAYS  fentaNYL (DURAGESIC) 25 mcg/hr PATCH 1 Patch by TransDERmal route every seventy-two (72) hours. Max Daily Amount: 1 Patch. Indications: Chronic Pain with Opioid Tolerance, SEVERE PAIN WITH OPIOID TOLERANCE  
 naloxone (NARCAN) 4 mg/actuation nasal spray Use 1 spray intranasally into 1 nostril as needed for opioid respiratory emergency only. Indications: OPIATE-INDUCED RESPIRATORY DEPRESSION  valsartan-hydroCHLOROthiazide (DIOVAN-HCT) 320-25 mg per tablet TAKE 1 TABLET BY MOUTH DAILY  potassium chloride (K-DUR, KLOR-CON) 20 mEq tablet TAKE 1 TABLET BY MOUTH TWICE DAILY  furosemide (LASIX) 40 mg tablet Take 1 Tab by mouth daily.  rivastigmine (EXELON) 4.6 mg/24 hr patch APPLY 1 PATCH TRANSDERMALLY EVERY DAY  lidocaine (XYLOCAINE) 5 % ointment USE DAILY AS NEEDED FOR DRESSING CHANGES  
 ONETOUCH ULTRA TEST strip TEST BLOOD SUGAR DAILY OR AS DIRECTED  
 ursodiol (ACTIGALL) 300 mg capsule TAKE 1 CAPSULE BY MOUTH TWICE DAILY  cromolyn (OPTICROM) 4 % ophthalmic solution Administer 1 Drop to both eyes.  diclofenac sodium (SOLARAZE) 3 % topical gel Apply  to affected area two (2) times daily as needed for Pain.  mupirocin calcium (BACTROBAN) 2 % topical cream Apply  to affected area two (2) times a day.  Blood-Glucose Meter (ONE TOUCH ULTRA 2) monitoring kit Use to test blood sugar daily or as directed  Lancets (ONE TOUCH ULTRASOFT LANCETS) Misc Use to test blood sugar daily or as directed  fentaNYL (DURAGESIC) 12 mcg/hr patch 1 Patch by TransDERmal route every seventy-two (72) hours for 30 days. Max Daily Amount: 1 Patch.  [] morphine IR (MS IR) 30 mg tablet Take 1 Tab by mouth three (3) times daily as needed for Pain for up to 30 days. Max Daily Amount: 90 mg. No current facility-administered medications on file prior to visit.    
  
 
ROS: 
Denies fever, chills, nausea, vomiting,  constipation, abdominal pain, chest pain, shortness or breath/trouble breathing, weakness, trouble swallowing, changes in vision, changes in hearing, falls, dizziness, bladder incontinence,depression, anxiety, suicidal ideations, homicidal ideations or alcohol use. Positive findings include diarrhea, bowel accidents Opioid specific risk: DM, GERD, dementia. Opioid specific history: concurrent use of opioid since before 2011, with no opioid holiday. Vitals:  
 01/21/19 9605 BP: 131/59 Pulse: 73 Resp: 14 Temp: 97 °F (36.1 °C) TempSrc: Oral  
SpO2: 97% Weight: 68.9 kg (152 lb) Height: 5' 3\" (1.6 m) PainSc:   8 PainLoc: Back Imaging: 
\"\"\"\"\"\" 8/5/11 MRI spine lumbar W and W/O contrast 
Impression: Artifact from spinal fusion hardware limits evaluation of much of the lumbar spine. There are degenerative changes as described. There is no narrowing of the visualized spinal canal.\"\"\"\"\"\" Labs BUN/CRE \"\"\"\" 9/19/18   18/1.1\"\"\"\" AST/ALT  \"\"\"\" 9/19/8     15/8\"\"\"\" Physical exam: 
AFVSS, no acute distress, overweight body habitus. A&OXs 3. Normocephalic, atraumatic. Conjugate gaze, clear sclerae. Speech is clear and appropriate. Mood is appropriate and patient is cooperative. Antalgic gait and decreased obdulia with assistance of a rolling seated walker Non-labored breathing. no acute distress Cervical, thoracic, lumbar, bilateral SIJ, bilateral piriformis, bilateral GT not tender to palpation LE:  
Strength for right lower extremity is 5/5 for hip flexion, knee extension, dorsiflexion, extensor hallucis longus, plantar flexion. Strength for left lower extremity is 5/5 for hip flexion, knee extension, dorsiflexion, extensor hallucis longus, plantar flexion. Sensation to light touch is intact for right L2-S2. Sensation to light touch is intact for left L2-S2. Muscle Stretch reflexes for bilateral lower extremities not tested Full AROM lumbar flexion did not cause reproduced primary pain Full AROM lumbar extension to upright posture did not cause reproduction of primary pain Calculated MEQ - 150 Naloxone rescue - ordered patient did not obtained from pharmacy Prophylactic bowel program - no Date of last OCA 1/21/19  
last UDS 10/02/18, consistent  date checked today, findings consistent Primary Care Physician Audra Parks[address] [place] [address] [address] [facility] [id number] 
[phone] Today   Last Visit    Prior visit ORT - 4       0 PGIC -5 and 3  2 and 7  5 and 3   
RONDA -46%  38%   42% COMM -10  4   4 PHQ -- . PHQ over the last two weeks 1/21/2019 Little interest or pleasure in doing things Not at all Feeling down, depressed, irritable, or hopeless Not at all Total Score PHQ 2 0  
 
 
DSM V-OUD Screen -deferred Assessment/Plan: ICD-10-CM ICD-9-CM 1. Chronic bilateral low back pain, with sciatica presence unspecified M54.5 724.2 morphine IR (MS IR) 30 mg tablet G89.29 338.29   
2. Chronic pain syndrome G89.4 338.4 morphine IR (MS IR) 30 mg tablet 3. Encounter for long-term (current) use of high-risk medication Z79.899 V58.69 Last visit, Referral for physical therapy to evaluate and treat with movement therapies to help empower patient overcome chronic pain. Patient's family did not follow through did not feel this would be beneficial. 
 
Last visit, Order compound cream to be applied 3-4 times as needed for pain, family unsure if patient received Recommended patient take over-the-counter Tylenol 500 mg tablets take 1-2 tablets up to 2 times a day as needed for pain. max daily dose of 2000 milligrams. Do not recommend long term opioid therapy for this patient at this time for their chronic pain; the risks outweigh the potential benefits.  Pt currently taking  fentanyl 25 MCG patch 1 patch every 72 hours for pain and morphine IR 30 mg take 1 tablet up to 3 times a day as needed for pain. Their MME is 150. Today, we will continue the weaning of patients opioid medication with a goal of being opioid free, pending safety and compliance. Pt instructed to call if they experience any signs of withdrawal (diarrhea, nausea, vomiting, sweating or chills, agitation, itching). Today, pt given prescription for patient has prescription for fentanyl 12 mcg/hr and just need a prescription for  morphine IR 30 mg tablet 1 tablet up to 3 times daily their new MME is 120. Pt instructed to call 5-7 days before they run out of their medications for refill. At this time pt will be provided with D/C fentanly and morphine IR 30 mg take 1 tablet up to 3 times a day as needed for pain. pending safety and compliance. At following refill, pt will be provided with morphine 20 mg 1 tablet up to 4 times daily as needed  pending safety and compliance. At next office visit, the plan is to provide patient with morphine IR 15 mg tablets 1 tablet up to 4 times daily as needed Their new MME will be 60. If patient has difficulty with the wean or difficulty with cravings we will consider referral to mental health for ongoing assessment and treatment for opioid use disorder. Consider nexwave, updated imaging, compound cream if patient did not receive. Follow up ongoing assessment and ongoing development of integrative and comprehensive plan of care for chronic pain. Goals: To establish complementary and integrative plan of care to address chronic pain issues while minimizing pharmaceuticals to maximize patient's function improve quality of life. Education: 
Patient again educated on the importance of strict compliance with the opioid care agreement while on opioid therapy. Patient also again educated that they should avoid driving while on chronic opioid therapy. Also advised to avoid alcohol and to avoid benzodiazepines while on opioid therapy. Handouts given regarding opioid safety and sleep health. Follow-up Disposition: Not on File has follow up with Dr Melanie Diaz in march [address] was used for portions of this report. Unintended errors may occur.

## 2019-03-12 ENCOUNTER — OFFICE VISIT (OUTPATIENT)
Dept: PAIN MANAGEMENT | Age: 84
End: 2019-03-12

## 2019-03-12 VITALS
BODY MASS INDEX: 26.93 KG/M2 | HEIGHT: 63 IN | DIASTOLIC BLOOD PRESSURE: 54 MMHG | WEIGHT: 152 LBS | SYSTOLIC BLOOD PRESSURE: 132 MMHG | TEMPERATURE: 99.1 F | RESPIRATION RATE: 16 BRPM | HEART RATE: 65 BPM

## 2019-03-12 DIAGNOSIS — M96.1 LUMBAR POST-LAMINECTOMY SYNDROME: ICD-10-CM

## 2019-03-12 DIAGNOSIS — M54.5 CHRONIC BILATERAL LOW BACK PAIN, WITH SCIATICA PRESENCE UNSPECIFIED: Primary | ICD-10-CM

## 2019-03-12 DIAGNOSIS — G89.4 CHRONIC PAIN SYNDROME: ICD-10-CM

## 2019-03-12 DIAGNOSIS — Z79.899 ENCOUNTER FOR LONG-TERM (CURRENT) USE OF HIGH-RISK MEDICATION: ICD-10-CM

## 2019-03-12 DIAGNOSIS — G89.29 CHRONIC BILATERAL LOW BACK PAIN, WITH SCIATICA PRESENCE UNSPECIFIED: Primary | ICD-10-CM

## 2019-03-12 RX ORDER — MORPHINE SULFATE 30 MG/1
30 TABLET ORAL
Qty: 90 TAB | Refills: 0 | Status: SHIPPED | OUTPATIENT
Start: 2019-03-12 | End: 2019-04-11

## 2019-03-12 RX ORDER — NALOXONE HYDROCHLORIDE 4 MG/.1ML
SPRAY NASAL
Qty: 1 EACH | Refills: 0 | Status: SHIPPED | OUTPATIENT
Start: 2019-03-12 | End: 2019-05-07 | Stop reason: SDUPTHER

## 2019-03-12 NOTE — PATIENT INSTRUCTIONS

## 2019-03-12 NOTE — PROGRESS NOTES
Nursing Notes    Patient presents to the office today in follow-up. Patient rates her pain at 5/10 on the numerical pain scale. Reviewed medications with counts as follows:    Rx Date filled Qty Dispensed Pill Count Last Dose Short   Morphine sulfate 30mg ir  01/29/19 90 5 Today  No    Fentanyl 12mcg 01/29/19 10 5 03/08/19 No                                reviewed YES  Any aberrancies noted on  NO  Last opioid agreement today   Last urine drug screen oral swab provided today     Comments:     POC UDS was not performed in office today ; oral swab provided this visit    Any new labs or imaging since last appointment? YES; labwork     Have you been to an emergency room (ER) or urgent care clinic since your last visit? NO            Have you been hospitalized since your last visit? NO     If yes, where, when, and reason for visit? Have you seen or consulted any other health care providers outside of the [address] [place] [address]  since your last visit? YES     If yes, where, when, and reason for visit ? Gastroenterology   Ms. Kemp has a reminder for a \"due or due soon\" health maintenance. I have asked that she contact her primary care provider for follow-up on this health maintenance.     3 most recent PHQ Screens 3/12/2019   Little interest or pleasure in doing things Not at all   Feeling down, depressed, irritable, or hopeless Not at all   Total Score PHQ 2 0

## 2019-03-12 NOTE — PROGRESS NOTES
Referred before 10/7/11, source PCP and for low back pain, hip pain, leg and foot pain. Pt was last seen here on 1/21/19  Calculated MEQ - 118  Naloxone rescue -yes  Prophylactic bowel program -yes  Date of last OCA  November 2017, renewed today   Last UDS 10/2018,findings consistent  -- checked today and findings were consistent     GIC-3 and 6  RONDA -34%  COMM- 6     HPI:  Porsha Linda  Is a 80 y.o. female here for f/u visit for ongoing evaluation of low back pain and lower extremity pain. Pt denies interval changes in the character or distribution of pain. She reports that she has been doing quite well despite the recent reduction in fentanyl. She reports that she does not have pain all the time and the pain when it does occur is only located near the lumbosacral junction and midline. The pain is described as ache and ranges from 3 to 9/10. She cannot identify specific exacerbating activities but states that when the pain hits it just hits randomly. --tylenol not used very often  --Diclofenac gel mildly effective  --Lidocaine ointment helpful  --Morphine extended release 30 mg up to 3 times daily  --Fentanyl 12 mcg/h every 3 days   Pt reports partial but incomplete analgesia with the current opioid regimen which helps to improve activity tolerance and function. Pt denies aberrant behaviors or any adverse effects.     ROS:Review of systems is negative for fever, chills, nausea, vomiting, diarrhea, constipation, chest pain, shortness of breath, abdominal pain, focal weakness, trouble swallowing, acute changes in vision, acute changes in hearing, dizziness, falls, depression, anxiety, suicidal ideation, homicidal ideation, alcohol use. Review of systems positive for  generalized weakness     Opioid specific risk:DM, GERD, dementia, advanced age  Opioid specific history: concurrent use of opioid since before 2011, with no opioid holiday.     Visit Vitals  /54 (BP 1 Location: Right arm, BP Patient Position: Sitting)   Pulse 65   Temp 99.1 °F (37.3 °C) (Oral)   Resp 16   Ht 5' 3\" (1.6 m)   Wt 68.9 kg (152 lb)   BMI 26.93 kg/m²        PE:  AFVSS, no acute distress, normal body habitus. A&OXs 3.  normocephalic, atraumatic. Conjugate gaze, clear sclerae,     Strength for right lower extremity is 4/5 for hip flexion, knee extension, dorsiflexion, extensor hallucis longus, plantar flexion. Strength for left lower extremity is 4/5 for hip flexion, knee extension, dorsiflexion, extensor hallucis longus, plantar flexion. Tenderness to palpation near the L4-5 junction and midline. No tenderness to palpation at bilateral lumbar paraspinals, upper lumbar spine, bilateral sacroiliac joints, bilateral piriformis muscles. Negative seated straight leg raise bilaterally. .     Gait is within functional limits with decreased obdulia and use of a 4 wheeled rolling walker. Balance is within functional limits with use of an assistive device. Sensation to light touch is intact for right L2-S2 and left L2-S2.        Primary Care Physician  MD Cheko Jaffe[address]  [phone]        PHQ -- .  3 most recent PHQ Screens 3/12/2019   Little interest or pleasure in doing things Not at all   Feeling down, depressed, irritable, or hopeless Not at all   Total Score PHQ 2 0            Assessment/Plan:   Encounter Diagnoses     ICD-10-CM ICD-9-CM   1. Chronic bilateral low back pain, with sciatica presence unspecified M54.5 724.2    G89.29 338.29   2. Chronic pain syndrome G89.4 338.4   3. Lumbar post-laminectomy syndrome M96.1 722.83   4. Encounter for long-term (current) use of high-risk medication Z79.899 V58.69        --I do not recommend long-term opioid therapy for this person's chronic pain. I believe the risks outweigh any potential benefits. We will progress with a gradual opioid wean.  Patient was educated on signs and symptoms of opioid withdrawal and advised to call the clinic should these symptoms arise so that we may provide support as needed. --Fentanyl will be discontinued at the end of the current prescription. --Continue morphine IR 30 mg as needed up to 3 times daily with 90 tablets provided for 30 days. Maintain this rate until next office visit in approximately 2 months. --Continue topicals as needed  Continue Tylenol as needed  --Follow-up  in 2 months with Claudene Bon    GOALS:  To establish complementary and integrative plan of care to address chronic pain issues while minimizing pharmaceuticals to maximize patient's function improve quality of life.     Education:  Patient again educated on the importance of strict compliance with the opioid care agreement while on opioid therapy. Also advised to avoid alcohol and to avoid benzodiazepines while on opioid therapy.     A total of 30 minutes were spent with the patient, of which more than half of the time was spent counseling and/or coordinating care.     F/u:. Follow-up Disposition:Follow-up Disposition:  Return in about 2 months (around 5/12/2019) for 30 min.

## 2019-04-10 ENCOUNTER — TELEPHONE (OUTPATIENT)
Dept: INTERNAL MEDICINE CLINIC | Age: 84
End: 2019-04-10

## 2019-04-10 DIAGNOSIS — F02.80 LATE ONSET ALZHEIMER'S DISEASE WITHOUT BEHAVIORAL DISTURBANCE (HCC): Primary | Chronic | ICD-10-CM

## 2019-04-10 DIAGNOSIS — G30.1 LATE ONSET ALZHEIMER'S DISEASE WITHOUT BEHAVIORAL DISTURBANCE (HCC): Primary | Chronic | ICD-10-CM

## 2019-04-10 DIAGNOSIS — Z76.0 MEDICATION REFILL: ICD-10-CM

## 2019-04-10 RX ORDER — RIVASTIGMINE 9.5 MG/24H
1 PATCH, EXTENDED RELEASE TRANSDERMAL DAILY
Qty: 30 PATCH | Refills: 5 | Status: SHIPPED | OUTPATIENT
Start: 2019-04-10 | End: 2019-10-14 | Stop reason: SDUPTHER

## 2019-04-10 NOTE — TELEPHONE ENCOUNTER
2 pt identifiers confirmed. Pt's son Temo Banks wants to know if rivastigmine can be increased or not. He states that mother's memory loss is worsening. Please advise.

## 2019-04-11 NOTE — TELEPHONE ENCOUNTER
I have sent over a stronger patch. But he should not be surprised if it does not have much effect. This is the natural course of dementia.

## 2019-04-11 NOTE — TELEPHONE ENCOUNTER
2 pt identifiers confirmed. Spoke with pt's son America Alvarenga and informed of below. No other questions at this time.

## 2019-04-17 ENCOUNTER — PATIENT OUTREACH (OUTPATIENT)
Dept: INTERNAL MEDICINE CLINIC | Age: 84
End: 2019-04-17

## 2019-04-17 RX ORDER — METOPROLOL TARTRATE 25 MG/1
TABLET, FILM COATED ORAL 2 TIMES DAILY
COMMUNITY
End: 2019-04-22 | Stop reason: SDUPTHER

## 2019-04-17 RX ORDER — CEPHALEXIN 500 MG/1
500 CAPSULE ORAL 4 TIMES DAILY
COMMUNITY
End: 2019-05-07 | Stop reason: ALTCHOICE

## 2019-04-17 RX ORDER — MORPHINE SULFATE 30 MG/1
TABLET, FILM COATED, EXTENDED RELEASE ORAL EVERY 12 HOURS
COMMUNITY
End: 2019-08-27

## 2019-04-17 NOTE — PROGRESS NOTES
Hospital Discharge Follow-Up Date/Time:  2019 2:02 PM 
 
Patient was admitted to THE [facility] on  and discharged on 19 for AMS & clogged biliary drain. The physician discharge summary was available at the time of outreach. Patient was contacted within 2 business days of discharge. Top Challenges reviewed with the provider Need to complete ACP and PHI form Method of communication with provider :chart routing Inpatient RRAT score: NA Was this a readmission? no  
Patient stated reason for the readmission: NA 
 
Nurse Navigator (NN) contacted the family by telephone to perform post hospital discharge assessment. Verified name and  with family as identifiers. Provided introduction to self, and explanation of the Nurse Navigator role. Reviewed discharge instructions and red flags with family who verbalized understanding. Family given an opportunity to ask questions and does not have any further questions or concerns at this time. The family agrees to contact the PCP office for questions related to their healthcare. NN provided contact information for future reference. Disease Specific:   N/A Summary of patient's top problems: 
1. Weak following hospitalization 2. Need to complete ACP 3. Home Health orders at discharge: none Home Health company: NA 
Date of initial visit: NA 
 
Durable Medical Equipment ordered/company: NA 
Durable Medical Equipment received: NA Barriers to care? No barriers, family takes care of all needs, patient has dementia Advance Care Planning:  
Does patient have an Advance Directive:  not on file; education provided. NN will set up appointment with family to complete. Medication(s):  
New Medications at Discharge: keflex, lopressor Changed Medications at Discharge: diovan dose changed Discontinued Medications at Discharge: fentanyl patch Medication reconciliation was performed with family, who verbalizes understanding of administration of home medications. There were no barriers to obtaining medications identified at this time. Referral to Pharm D needed: no  
 
Current Outpatient Medications Medication Sig  cephALEXin (KEFLEX) 500 mg capsule Take 500 mg by mouth four (4) times daily.  morphine CR (MS CONTIN) 30 mg CR tablet Take  by mouth every twelve (12) hours.  rivastigmine (EXELON) 9.5 mg/24 hr patch 1 Patch by TransDERmal route daily.  amLODIPine (NORVASC) 10 mg tablet TAKE 1 TABLET BY MOUTH EVERY DAY  ergocalciferol (ERGOCALCIFEROL) 50,000 unit capsule TAKE 1 CAPSULE BY MOUTH EVERY 7 DAYS  omeprazole (PRILOSEC) 40 mg capsule TAKE 1 CAPSULE BY MOUTH DAILY  tolterodine ER (DETROL LA) 4 mg ER capsule TAKE 1 CAPSULE BY MOUTH DAILY  naloxone (NARCAN) 4 mg/actuation nasal spray Use 1 spray intranasally, then discard. Repeat with new spray every 2 min as needed for opioid overdose symptoms, alternating nostrils.  potassium chloride (K-DUR, KLOR-CON) 20 mEq tablet TAKE 1 TABLET BY MOUTH TWICE DAILY  valsartan-hydroCHLOROthiazide (DIOVAN-HCT) 320-25 mg per tablet TAKE 1 TABLET BY MOUTH DAILY  naloxone (NARCAN) 4 mg/actuation nasal spray Use 1 spray intranasally into 1 nostril as needed for opioid respiratory emergency only. Indications: OPIATE-INDUCED RESPIRATORY DEPRESSION  
 ONETOUCH ULTRA TEST strip TEST BLOOD SUGAR DAILY OR AS DIRECTED  
 ursodiol (ACTIGALL) 300 mg capsule TAKE 1 CAPSULE BY MOUTH TWICE DAILY  Blood-Glucose Meter (ONE TOUCH ULTRA 2) monitoring kit Use to test blood sugar daily or as directed  Lancets (ONE TOUCH ULTRASOFT LANCETS) Misc Use to test blood sugar daily or as directed  metoprolol tartrate (LOPRESSOR) 25 mg tablet Take  by mouth two (2) times a day.  furosemide (LASIX) 40 mg tablet Take 1 Tab by mouth daily.   
 lidocaine (XYLOCAINE) 5 % ointment USE DAILY AS NEEDED FOR DRESSING CHANGES  
  cromolyn (OPTICROM) 4 % ophthalmic solution Administer 1 Drop to both eyes.  diclofenac sodium (SOLARAZE) 3 % topical gel Apply  to affected area two (2) times daily as needed for Pain.  mupirocin calcium (BACTROBAN) 2 % topical cream Apply  to affected area two (2) times a day. No current facility-administered medications for this visit. There are no discontinued medications. [facility] follow up appointment(s):  
Future Appointments Date Time Provider Karen Brown 4/22/2019 11:30 AM Darrel Kennedy MD [address] [place] [address]  
5/7/2019 10:00 AM SPENCER Steinberg  
6/17/2019 11:00 AM Darrel Parks MD [address] [place] [address] Non-[facility] follow up appointment(s): no 
Dispatch Health:  n/a  
 
 
Goals  Patient and caregivers will complete an advanced care plan (pt-stated) Patient/caregivers will supply new medications as prescribed. Patient will begin ambulating with walker to keep circulation at optimal level.  Prevent complications post hospitalization.

## 2019-04-18 ENCOUNTER — PATIENT OUTREACH (OUTPATIENT)
Dept: INTERNAL MEDICINE CLINIC | Age: 84
End: 2019-04-18

## 2019-04-18 NOTE — PROGRESS NOTES
Dr. Mello responded to NN by saying she did not feel patient was aware enough to execute and ACP. NN downloaded a copy of DDNR and asked provider to complete at patient's appointment next week.

## 2019-04-22 ENCOUNTER — OFFICE VISIT (OUTPATIENT)
Dept: INTERNAL MEDICINE CLINIC | Age: 84
End: 2019-04-22

## 2019-04-22 VITALS
OXYGEN SATURATION: 94 % | SYSTOLIC BLOOD PRESSURE: 122 MMHG | HEIGHT: 63 IN | DIASTOLIC BLOOD PRESSURE: 71 MMHG | WEIGHT: 149 LBS | TEMPERATURE: 98.4 F | RESPIRATION RATE: 20 BRPM | HEART RATE: 120 BPM | BODY MASS INDEX: 26.4 KG/M2

## 2019-04-22 DIAGNOSIS — Z76.0 MEDICATION REFILL: ICD-10-CM

## 2019-04-22 DIAGNOSIS — I48.91 ATRIAL FIBRILLATION, UNSPECIFIED TYPE (HCC): ICD-10-CM

## 2019-04-22 DIAGNOSIS — I10 ESSENTIAL HYPERTENSION: Chronic | ICD-10-CM

## 2019-04-22 DIAGNOSIS — Z09 HOSPITAL DISCHARGE FOLLOW-UP: Primary | ICD-10-CM

## 2019-04-22 PROBLEM — E11.21 TYPE 2 DIABETES WITH NEPHROPATHY (HCC): Status: ACTIVE | Noted: 2019-04-22

## 2019-04-22 RX ORDER — METOPROLOL TARTRATE 25 MG/1
25 TABLET, FILM COATED ORAL 2 TIMES DAILY
Qty: 60 TAB | Refills: 5 | Status: SHIPPED | OUTPATIENT
Start: 2019-04-22 | End: 2019-10-21 | Stop reason: SDUPTHER

## 2019-04-22 RX ORDER — LIDOCAINE 50 MG/G
OINTMENT TOPICAL
Qty: 50 G | Refills: 5 | Status: SHIPPED | OUTPATIENT
Start: 2019-04-22 | End: 2020-01-01 | Stop reason: SDUPTHER

## 2019-04-22 RX ORDER — VALSARTAN AND HYDROCHLOROTHIAZIDE 80; 12.5 MG/1; MG/1
1 TABLET, FILM COATED ORAL DAILY
Qty: 30 TAB | Refills: 5 | Status: SHIPPED | OUTPATIENT
Start: 2019-04-22 | End: 2019-06-17

## 2019-04-22 NOTE — PROGRESS NOTES
HISTORY OF PRESENT ILLNESS Crow Whitaker is a 80 y.o. female. /71 (BP 1 Location: Left arm, BP Patient Position: Sitting)   Pulse (!) 120   Temp 98.4 °F (36.9 °C) (Oral)   Resp 20   Ht 5' 3\" (1.6 m)   Wt 149 lb (67.6 kg)   SpO2 94%   BMI 26.39 kg/m² Pt was in [facility] 55 4/11/19-4/14/19 for clogged tube. Son is very conversant Pt is not. But she can express her wishes. She had while there atrial fibrillation and had metoprolol added. So her losartan-HCTZ was lowered to protect her BP Home Health was re-started but a new [facility] Follow Up The history is provided by the patient. This is a new problem. Pertinent negatives include no chest pain and no shortness of breath. Review of Systems Constitutional: Negative for chills and fever. Respiratory: Negative for shortness of breath. Cardiovascular: Negative for chest pain and palpitations. Physical Exam  
Constitutional: She is oriented to person, place, and time. She appears well-developed and well-nourished. No distress. HENT:  
Right Ear: Tympanic membrane, external ear and ear canal normal.  
Left Ear: Tympanic membrane, external ear and ear canal normal.  
Mouth/Throat: Oropharynx is clear and moist.  
Cardiovascular: Normal rate. Pulmonary/Chest: Effort normal.  
Neurological: She is alert and oriented to person, place, and time. Skin: Skin is warm and dry. She is not diaphoretic. Psychiatric: She has a normal mood and affect. Nursing note and vitals reviewed. ASSESSMENT and PLAN 
  ICD-10-CM ICD-9-CM 1. Hospital discharge follow-up Z09 V67.59   
2. Essential hypertension I10 401.9 metoprolol tartrate (LOPRESSOR) 25 mg tablet  
   valsartan-hydroCHLOROthiazide (DIOVAN-HCT) 80-12.5 mg per tablet 3. Atrial fibrillation, unspecified type (HCC) I48.91 427.31 metoprolol tartrate (LOPRESSOR) 25 mg tablet 4. Medication refill Z76.0 V68.1 lidocaine (XYLOCAINE) 5 % ointment metoprolol tartrate (LOPRESSOR) 25 mg tablet  
   valsartan-hydroCHLOROthiazide (DIOVAN-HCT) 80-12.5 mg per tablet Available hospital/ER record reviewed BP controlled. Now on 3 meds Atrial fib--now ion NSR She is responsive and followed simple commands Reviewed meds with son as well Assuming she stays well, will f/u as appointed in June

## 2019-04-22 NOTE — PROGRESS NOTES
ROOM # Agueda Ponce presents today for Chief Complaint Patient presents with  
[facility] Follow Up  
[facility] Visit Agueda Ponce preferred language for health care discussion is english/other. Is someone accompanying this pt? son Is the patient using any DME equipment during OV? wheelchair Depression Screening: 
3 most recent [facility] Screens 3/12/2019 1/21/2019 12/17/2018 10/2/2018 8/16/2018 8/8/2018 4/30/2018 Little interest or pleasure in doing things Not at all Not at all Not at all Not at all Not at all Not at all Not at all Feeling down, depressed, irritable, or hopeless Not at all Not at all Not at all Not at all Not at all Not at all Not at all Total Score PHQ 2 0 0 0 0 0 0 0 Learning Assessment: 
Learning Assessment 1/21/2019 8/8/2018 1/25/2018 5/26/2015 12/26/2013 PRIMARY LEARNER Child(mary) Patient Patient Patient Patient HIGHEST LEVEL OF EDUCATION - PRIMARY LEARNER  4 YEARS OF COLLEGE GRADUATED HIGH SCHOOL OR GED GRADUATED HIGH SCHOOL OR GED GRADUATED HIGH SCHOOL OR GED -  
BARRIERS PRIMARY LEARNER COGNITIVE HEARING HEARING NONE -  
CO-LEARNER CAREGIVER Yes Yes Yes Yes -  
CO-LEARNER NAME FRANSICO LFDAJULY AHZCHH SQYLAF           RQGISY OQTSSK [id number] [id number] -  
CO-LEARNER HIGHEST LEVEL OF EDUCATION - > 4 YEARS OF COLLEGE > 4 YEARS OF COLLEGE - -  
BARRIERS CO-LEARNER - NONE NONE - - PRIMARY LANGUAGE ENGLISH ENGLISH ENGLISH ENGLISH ENGLISH  
PRIMARY LANGUAGE CO-LEARNER - ENGLISH ENGLISH - - LEARNER PREFERENCE PRIMARY DEMONSTRATION LISTENING LISTENING READING LISTENING  
LEARNER PREFERENCE CO-LEARNER - DEMONSTRATION - - -  
ANSWERED BY son self self patient patient RELATIONSHIP OTHER SELF SELF SELF SELF Abuse Screening: 
Abuse Screening Questionnaire 1/21/2019 8/8/2018 1/25/2018 11/30/2017 Do you ever feel afraid of your partner? N N N N Are you in a relationship with someone who physically or mentally threatens you?  N N N N  
 Is it safe for you to go home? Brandie Saldaña Fall Risk Fall Risk Assessment, last 12 mths 1/21/2019 12/17/2018 8/16/2018 8/8/2018 1/25/2018 9/18/2017 5/11/2017 Able to walk? Yes Yes Yes Yes Yes Yes Yes Fall in past 12 months? Yes Yes Yes No No No No  
Fall with injury? No No No - - - - Number of falls in past 12 months 1 1 1 - - - - Fall Risk Score 1 1 1 - - - - Health Maintenance reviewed and discussed per provider. Yes 
 
Molly Velasquez is due for Health Maintenance Due Topic Date Due  Shingrix Vaccine Age 50> (1 of 2) 01/26/1976  MICROALBUMIN Q1  10/19/2017  MEDICARE YEARLY EXAM  04/13/2019 Please order/place referral if appropriate. Advance Directive: 1. Do you have an advance directive in place? Patient Reply: no 
 
2. If not, would you like material regarding how to put one in place? Patient Reply: no 
 
Coordination of Care: 1. Have you been to the ER, urgent care clinic since your last visit? Hospitalized since your last visit? yes 2. Have you seen or consulted any other health care providers outside of the [address] [place] [address] since your last visit? Include any pap smears or colon screening. no 
 
5

## 2019-04-23 DIAGNOSIS — M47.816 LUMBAR ARTHROPATHY: ICD-10-CM

## 2019-04-23 DIAGNOSIS — M54.5 CHRONIC BILATERAL LOW BACK PAIN, WITH SCIATICA PRESENCE UNSPECIFIED: Primary | ICD-10-CM

## 2019-04-23 DIAGNOSIS — G89.29 CHRONIC BILATERAL LOW BACK PAIN, WITH SCIATICA PRESENCE UNSPECIFIED: Primary | ICD-10-CM

## 2019-04-23 DIAGNOSIS — M46.1 SACROILIITIS (HCC): ICD-10-CM

## 2019-04-23 NOTE — TELEPHONE ENCOUNTER
Returned call using two patient identifiers. Maximo Driver is on HIPPA list.  Suraj Meredith has called requesting a refill of their controlled medication, Morphine, for the management of chronic pain. Last office visit date: 3/12/19  Last opioid care agreement 3/19  Last UDS was done 10/18    Date last  was pulled and reviewed : 4/23/19  Last fill date for medication was 3/14/19    Was the patient compliant when the above report was pulled? yes    Analgesia: Patient reports 80% pain relief on current regimen. Aberrancies: No aberrancies noted in the last 30 days. ADL's: Patient states they are able to perform ADL's on current regimen with some assistance. .      Adverse Reaction: Patient reports no adverse reactions at this time. Provider's last note and plan of care reviewed? yes  Request forwarded to provider for review.

## 2019-04-24 RX ORDER — MORPHINE SULFATE 30 MG/1
30 TABLET ORAL 3 TIMES DAILY
Qty: 90 TAB | Refills: 0 | Status: SHIPPED | OUTPATIENT
Start: 2019-04-24 | End: 2019-05-24

## 2019-04-25 NOTE — TELEPHONE ENCOUNTER
Spoke with Tesfaye Henriquez who has permission to do everything for patient and let him know that there is a prescription ready for pickup.

## 2019-05-07 ENCOUNTER — OFFICE VISIT (OUTPATIENT)
Dept: PAIN MANAGEMENT | Age: 84
End: 2019-05-07

## 2019-05-07 VITALS
TEMPERATURE: 97.7 F | HEART RATE: 72 BPM | WEIGHT: 149 LBS | BODY MASS INDEX: 26.4 KG/M2 | OXYGEN SATURATION: 95 % | RESPIRATION RATE: 14 BRPM | DIASTOLIC BLOOD PRESSURE: 61 MMHG | SYSTOLIC BLOOD PRESSURE: 119 MMHG | HEIGHT: 63 IN

## 2019-05-07 DIAGNOSIS — G89.4 CHRONIC PAIN SYNDROME: ICD-10-CM

## 2019-05-07 DIAGNOSIS — Z79.899 ENCOUNTER FOR LONG-TERM (CURRENT) USE OF HIGH-RISK MEDICATION: ICD-10-CM

## 2019-05-07 DIAGNOSIS — M96.1 LUMBAR POST-LAMINECTOMY SYNDROME: ICD-10-CM

## 2019-05-07 DIAGNOSIS — G89.29 CHRONIC BILATERAL LOW BACK PAIN, WITH SCIATICA PRESENCE UNSPECIFIED: Primary | ICD-10-CM

## 2019-05-07 DIAGNOSIS — M54.5 CHRONIC BILATERAL LOW BACK PAIN, WITH SCIATICA PRESENCE UNSPECIFIED: Primary | ICD-10-CM

## 2019-05-07 RX ORDER — MORPHINE SULFATE 15 MG/1
15 TABLET ORAL
Qty: 150 TAB | Refills: 0 | Status: SHIPPED | OUTPATIENT
Start: 2019-05-25 | End: 2019-07-12 | Stop reason: SDUPTHER

## 2019-05-07 NOTE — PROGRESS NOTES
Referred before 10/7/11, source PCP and for low back pain, hip pain, leg and foot pain. Calculated MEQ - 90 Naloxone rescue -yes Prophylactic bowel program -yes Date of last OCA 3/12/19 Last UDS 5/7/2019, consistent POC, pending confirmatory testing  date checked today, findings consistent Today   Last Visit  Prior Visit(s) ORT -       4 PGIC -3 and 4  3 and 6  5 and 3 
RONDA -44%  34%   46% COMM - 4  6   10 HPI: 
Neo Laureano is a 80 y.o. female here for f/u visit for ongoing evaluation of low back pain and lower extremity pain. Pt was last seen here on 3/2/2019. Pt denies interval changes on the character or distribution of pain. Pain is located gluteal region and elias lower extremities. The pain is described as aching. Pain at its best is 3/10. Pain at its worse is 9/10. The pain is worsened by randomly once in awhile . Symptoms are improved by lidocaine ointment, diclofenac gel (mildly effective). Since last visit, patient was recently in hospital to have her biliary tube changed and was in hospital for three days . ROS: 
Positive findings include constipation, weakness and bowel or bladder incontinence. Denies fever, chills, nausea, vomiting, diarrhea, constipation, abdominal pain, chest pain, shortness or breath/trouble breathing, weakness, trouble swallowing, changes in vision, changes in hearing, falls, dizziness, bladder incontinence, bowel incontinence, depression, anxiety, suicidal ideations, homicidal ideations or alcohol use. Opioid specific risk:DM, GERD, dementia, advanced age Opioid specific history: concurrent use of opioid since before 2011, with no opioid holiday Vitals:  
 05/07/19 1019 BP: 119/61 Pulse: 72 Resp: 14 Temp: 97.7 °F (36.5 °C) TempSrc: Oral  
SpO2: 95% Weight: 67.6 kg (149 lb) Height: 5' 3\" (1.6 m) PainSc:   4 PainLoc: Leg Physical exam: 
AFVSS, no acute distress, normal body habitus. A&OXs 3. Normocephalic, atraumatic. Conjugate gaze, clear sclerae. EOM intact. Speech is clear and appropriate. Mood is appropriate and patient is cooperative. Patient present with rolling seated walker for gait and balance Balance is within functional limits. Non-labored breathing. Even rise of chest wall. Primary Care Physician Audra Parks3 [address] [facility] [id number] 
[phone] PHQ -- . 
3 most recent PHQ Screens 5/7/2019 Little interest or pleasure in doing things Not at all Feeling down, depressed, irritable, or hopeless Not at all Total Score PHQ 2 0 Assessment/Plan: ICD-10-CM ICD-9-CM 1. Chronic bilateral low back pain, with sciatica presence unspecified M54.5 724.2 morphine IR (MS IR) 15 mg tablet G89.29 338.29 2. Lumbar post-laminectomy syndrome M96.1 722.83 morphine IR (MS IR) 15 mg tablet 3. Chronic pain syndrome G89.4 338.4 morphine IR (MS IR) 15 mg tablet 4. Encounter for long-term (current) use of high-risk medication Z79.899 V58.69 DRUG SCREEN  
   CANCELED: AMB POC DRUG SCREEN () --Oral swab completed today sent for confirmatory testing. 
 
--Continue topicals as needed Continue Tylenol 500 mg tablets 1-2 tabs up to twice daily as needed with a total max daily dose 2000 mg. Do not recommend long term opioid therapy for this patient at this time for their chronic pain; the risks outweigh the potential benefits. Pt currently taking morphine IR 30 mg tablets 1 tablet up to 3 times daily as needed. Their MME is 90. Today, we will continue the weaning of patients opioid medication with a goal of being opioid free, pending safety and compliance. Pt instructed to call if they experience any signs of withdrawal (diarrhea, nausea, vomiting, sweating or chills, agitation, itching).  Today, pt given prescription for morphine IR 15 mg tablets 1-2 tablet up to 3 times daily as needed total of 5 tabs daily. Their new MME is 75. At next office visit, the plan is to provide patient with morphine IR 15 mg tablets 1-2 tablet up to 3 times daily as needed total of 4 tabs daily. Their new MME will be 60. If patient has difficulty with the wean or difficulty with cravings we will consider referral to mental health for ongoing assessment and treatment for opioid use disorder. --Consider nex wave, Updated imaging. Follow up ongoing assessment and ongoing development of integrative and comprehensive plan of care for chronic pain. Goals: To establish complementary and integrative plan of care to address chronic pain issues while minimizing pharmaceuticals to maximize patient's function improve quality of life. Education: 
Patient again educated on the importance of strict compliance with the opioid care agreement while on opioid therapy. Patient also again educated that they should avoid driving while on chronic opioid therapy. Also advised to avoid alcohol and to avoid benzodiazepines while on opioid therapy. Handouts given regarding opioid safety and sleep health. Follow-up and Dispositions · Return in about 3 months (around 8/7/2019). Social History Socioeconomic History  Marital status:  Spouse name: Not on file  Number of children: Not on file  Years of education: Not on file  Highest education level: Not on file Occupational History  Not on file Social Needs  Financial resource strain: Not on file  Food insecurity:  
  Worry: Not on file Inability: Not on file  Transportation needs:  
  Medical: Not on file Non-medical: Not on file Tobacco Use  Smoking status: Never Smoker  Smokeless tobacco: Never Used Substance and Sexual Activity  Alcohol use: No  
 Drug use: No  
 Sexual activity: Never Lifestyle  Physical activity:  
  Days per week: Not on file Minutes per session: Not on file  Stress: Not on file Relationships  Social connections:  
  Talks on phone: Not on file Gets together: Not on file Attends Scientologist service: Not on file Active member of club or organization: Not on file Attends meetings of clubs or organizations: Not on file Relationship status: Not on file  Intimate partner violence:  
  Fear of current or ex partner: Not on file Emotionally abused: Not on file Physically abused: Not on file Forced sexual activity: Not on file Other Topics Concern  Not on file Social History Narrative  Not on file Family History Problem Relation Age of Onset  Hypertension Mother  Hypertension Father  Stroke Father Allergies Allergen Reactions  Aspirin Nausea Only and Unknown (comments) Other reaction(s): gi distress Past Medical History:  
Diagnosis Date  Abdominal pain, other specified site 10/11  
 with abnml LFT, no etiology found  Abnormal LFTs  Anemia  Bile duct stone 11/13 Dr. Raymundo Joe  Bunion of great toe of right foot 3/1/2013  Cholangitis 4/14/16  Chondromalacia of right shoulder 11/6/2014  Colon polyps  CRI (chronic renal insufficiency)  CVA (cerebral infarction) LACUNAR  
 DDD (degenerative disc disease), lumbar 11/6/2014  Dementia  Diabetes ([facility] [address]  DJD (degenerative joint disease) of lumbar spine 1994  DJD (degenerative joint disease), multiple sites 11/6/2014  Gallstone pancreatitis   
 stent placed, June 2012  GERD (gastroesophageal reflux disease)  History of bone density study 1/07  
 wnl  Hypercholesterolemia  Hypertension  Lumbar arthropathy 11/6/2014  Lumbar nerve root impingement 11/6/2014  Lumbar post-laminectomy syndrome 11/6/2014  Pancolonic diverticulosis  Polyarthralgia 11/6/2014  Post laminectomy syndrome 2002  S/P lumbar spinal fusion 11/6/2014  Sacroiliitis ([facility] [address] 11/6/2014  Spondylolisthesis of lumbar region 11/6/2014  Subscapularis tendinitis of right shoulder 11/6/2014 Past Surgical History:  
Procedure Laterality Date  COLONOSCOPY  5/29/12 Dr. Merced Diaz, single polyp, 5 yr f/u  
 HX BUNIONECTOMY    
 LEFT  
 [address] RELEASE  2000  HX CHOLECYSTECTOMY  12/09 [facility]  HX TONSIL AND ADENOIDECTOMY Current Outpatient Medications on File Prior to Visit Medication Sig  morphine IR (MS IR) 30 mg tablet Take 1 Tab by mouth three (3) times daily for 30 days. Max Daily Amount: 90 mg.  
 lidocaine (XYLOCAINE) 5 % ointment USE DAILY AS NEEDED FOR DRESSING CHANGES  
 metoprolol tartrate (LOPRESSOR) 25 mg tablet Take 1 Tab by mouth two (2) times a day.  valsartan-hydroCHLOROthiazide (DIOVAN-HCT) 80-12.5 mg per tablet Take 1 Tab by mouth daily.  morphine CR (MS CONTIN) 30 mg CR tablet Take  by mouth every twelve (12) hours.  rivastigmine (EXELON) 9.5 mg/24 hr patch 1 Patch by TransDERmal route daily.  amLODIPine (NORVASC) 10 mg tablet TAKE 1 TABLET BY MOUTH EVERY DAY  ergocalciferol (ERGOCALCIFEROL) 50,000 unit capsule TAKE 1 CAPSULE BY MOUTH EVERY 7 DAYS  omeprazole (PRILOSEC) 40 mg capsule TAKE 1 CAPSULE BY MOUTH DAILY  tolterodine ER (DETROL LA) 4 mg ER capsule TAKE 1 CAPSULE BY MOUTH DAILY  potassium chloride (K-DUR, KLOR-CON) 20 mEq tablet TAKE 1 TABLET BY MOUTH TWICE DAILY  furosemide (LASIX) 40 mg tablet Take 1 Tab by mouth daily.  ONETOUCH ULTRA TEST strip TEST BLOOD SUGAR DAILY OR AS DIRECTED  
 ursodiol (ACTIGALL) 300 mg capsule TAKE 1 CAPSULE BY MOUTH TWICE DAILY  cromolyn (OPTICROM) 4 % ophthalmic solution Administer 1 Drop to both eyes.  diclofenac sodium (SOLARAZE) 3 % topical gel Apply  to affected area two (2) times daily as needed for Pain.   
 mupirocin calcium (BACTROBAN) 2 % topical cream Apply  to affected area two (2) times a day.  Blood-Glucose Meter (ONE TOUCH ULTRA 2) monitoring kit Use to test blood sugar daily or as directed  Lancets (ONE TOUCH ULTRASOFT LANCETS) Misc Use to test blood sugar daily or as directed  naloxone (NARCAN) 4 mg/actuation nasal spray Use 1 spray intranasally into 1 nostril as needed for opioid respiratory emergency only. Indications: OPIATE-INDUCED RESPIRATORY DEPRESSION No current facility-administered medications on file prior to visit. [address] was used for portions of this report. Unintended errors may occur.

## 2019-05-07 NOTE — PROGRESS NOTES
Nursing Notes Patient presents to the office today in follow-up. Patient rates her pain at 4/10 on the numerical pain scale. Reviewed medications with counts as follows:   
Rx Date filled Qty Dispensed Pill Count Last Dose Short Morphine 30 mg 4/26/19 90 77 today no  
       
       
       
          
 reviewed YES Any aberrancies noted on  NO Last opioid agreement 3/12/19 Last urine drug screen 10/2/18  today 3 most recent PHQ Screens 5/7/2019 Little interest or pleasure in doing things Not at all Feeling down, depressed, irritable, or hopeless Not at all Total Score PHQ 2 0 Comments: POC UDS was performed in office today Any new labs or imaging since last appointment? YES, lab and Xray abdomen Have you been to an emergency room (ER) or urgent care clinic since your last visit? [facility] Have you been hospitalized since your last visit? YES, [facility] biliary tube replaced If yes, where, when, and reason for visit? Have you seen or consulted any other health care providers outside of the [address] [place] [address]  since your last visit? YES If yes, where, when, and reason for visit? Ms. Bill Santiago has a reminder for a \"due or due soon\" health maintenance. I have asked that she contact her primary care provider for follow-up on this health maintenance.

## 2019-05-07 NOTE — PATIENT INSTRUCTIONS
Learning About Sleeping Well What does sleeping well mean? Sleeping well means getting enough sleep. How much sleep is enough varies among people. The number of hours you sleep is not as important as how you feel when you wake up. If you do not feel refreshed, you probably need more sleep. Another sign of not getting enough sleep is feeling tired during the day. The average total nightly sleep time is 7½ to 8 hours. Healthy adults may need a little more or a little less than this. Why is getting enough sleep important? Getting enough quality sleep is a basic part of good health. When your sleep suffers, your mood and your thoughts can suffer too. You may find yourself feeling more grumpy or stressed. Not getting enough sleep also can lead to serious problems, including injury, accidents, anxiety, and depression. What might cause poor sleeping? Many things can cause sleep problems, including: · Stress. Stress can be caused by fear about a single event, such as giving a speech. Or you may have ongoing stress, such as worry about work or school. · Depression, anxiety, and other mental or emotional conditions. · Changes in your sleep habits or surroundings. This includes changes that happen where you sleep, such as noise, light, or sleeping in a different bed. It also includes changes in your sleep pattern, such as having jet lag or working a late shift. · Health problems, such as pain, breathing problems, and restless legs syndrome. · Lack of regular exercise. How can you help yourself? Here are some tips that may help you sleep more soundly and wake up feeling more refreshed. Your sleeping area · Use your bedroom only for sleeping and sex. A bit of light reading may help you fall asleep. But if it doesn't, do your reading elsewhere in the house. Don't watch TV in bed. · Be sure your bed is big enough to stretch out comfortably, especially if you have a sleep partner. · Keep your bedroom quiet, dark, and cool. Use curtains, blinds, or a sleep mask to block out light. To block out noise, use earplugs, soothing music, or a \"white noise\" machine. Your evening and bedtime routine · Create a relaxing bedtime routine. You might want to take a warm shower or bath, listen to soothing music, or drink a cup of noncaffeinated tea. · Go to bed at the same time every night. And get up at the same time every morning, even if you feel tired. What to avoid · Limit caffeine (coffee, tea, caffeinated sodas) during the day, and don't have any for at least 4 to 6 hours before bedtime. · Don't drink alcohol before bedtime. Alcohol can cause you to wake up more often during the night. · Don't smoke or use tobacco, especially in the evening. Nicotine can keep you awake. · Don't take naps during the day, especially close to bedtime. · Don't lie in bed awake for too long. If you can't fall asleep, or if you wake up in the middle of the night and can't get back to sleep within 15 minutes or so, get out of bed and go to another room until you feel sleepy. · Don't take medicine right before bed that may keep you awake or make you feel hyper or energized. Your doctor can tell you if your medicine may do this and if you can take it earlier in the day. If you can't sleep · Imagine yourself in a peaceful, pleasant scene. Focus on the details and feelings of being in a place that is relaxing. · Get up and do a quiet or boring activity until you feel sleepy. · Don't drink any liquids after 6 p.m. if you wake up often because you have to go to the bathroom. Where can you learn more? Go to http://ayse-fernandez.info/. Enter Y478 in the search box to learn more about \"Learning About Sleeping Well. \" Current as of: September 11, 2018 Content Version: 11.9 © 9574-9338 [employer].  Care instructions adapted under license by [address] (which disclaims liability or warranty for this information). If you have questions about a medical condition or this instruction, always ask your healthcare professional. [address] any warranty or liability for your use of this information. Safe Use of Opioid Pain Medicine: Care Instructions Your Care Instructions Pain is your body's way of warning you that something is wrong. Pain feels different for everybody. Only you can describe your pain. A doctor can suggest or prescribe many types of medicines for pain. These range from over-the-counter medicines like acetaminophen (Tylenol) to powerful medicines called opioids. Examples of opioids are fentanyl, hydrocodone, morphine, and oxycodone. Heroin is an illegal opioid Opioids are strong medicines. They can help you manage pain when you use them the right way. But if you misuse them, they can cause serious harm and even death. For these reasons, doctors are very careful about how they prescribe opioids. If you decide to take opioids, here are some things to remember. · Keep your doctor informed. You can get addicted to opioids. The risk is higher if you have a history of substance use. Your doctor will monitor you closely for signs of misuse and addiction and to figure out when you no longer need to take opioids. · Make a treatment plan. The goal of your plan is to be able to function and do the things you need to do, even if you still have some pain. You might be able to manage your pain with other non-opioid options like physical therapy, relaxation, or over-the-counter pain medicines. · Be aware of the side effects. Opioids can cause serious side effects, such as constipation, dry mouth, and nausea. And over time, you may need a higher dose to get pain relief. This is called tolerance. Your body also gets used to opioids. This is called physical dependence.  If you suddenly stop taking them, you may have withdrawal symptoms. The doctor carefully considered what pain medicine is right for you. You may not have received opioids if your doctor was concerned about drug interactions or your safety, or if he or she had other concerns. It is best to have one doctor or clinic treat your pain. This way you will get the pain medicine that will help you the most. And a doctor will be able to watch for any problems that the medicine might cause. The doctor has checked you carefully, but problems can develop later. If you notice any problems or new symptoms,  get medical treatment right away. Follow-up care is a key part of your treatment and safety. Be sure to make and go to all appointments, and call your doctor if you are having problems. It's also a good idea to know your test results and keep a list of the medicines you take. How can you care for yourself at home? If you need to take opioids to manage your pain, remember these safety tips. · Follow directions carefully. It's easy to misuse opioids if you take a dose other than what's prescribed by your doctor. This can lead to overdose and even death. Even sharing them with someone they weren't meant for is misuse. · Be cautious. Opioids may affect your judgment and decision making. Do not drive or operate machinery until you can think clearly. Talk with your doctor about when it is safe to drive. · Reduce the risk of drug interactions. Opioids can be dangerous if you take them with alcohol or with certain drugs like sleeping pills and muscle relaxers. Make sure your doctor knows about all the other medicines you take, including over-the-counter medicines. Don't start any new medicines before you talk to your doctor or pharmacist. 
· Safely store and dispose of opioids. Store opioids in a safe and secure place.  Make sure that pets, children, friends, and family can't get to them. When you're done using opioids, make sure to dispose of them safely and as quickly as possible. The U.S. Food and Drug Administration (FDA) recommends these disposal options. ? The best option is to take your medicine to a drop-off box or take-back program that is authorized by the [employer] (MARYANN). ? If these programs aren't available in your area and your medicine doesn't have specific disposal instructions (such as flushing), you can throw them into your household trash if you follow the FDA's instructions. Visit fda.gov and search for \"unused medicine disposal.\" 
? If you have opioid patches (used or unused), your options are to take them to a MARYANN-authorized site or flush them down the toilet. Do not throw them in the trash. ? Only flush your medicine down the toilet if you can't get to a MARYANN-approved site or your medicine instructions state clearly to flush them. · Reduce the risk of overdose. Misuse of opioids can be very dangerous. Protect yourself by asking your doctor about a naloxone rescue kit. It can help youand even save your lifeif you take too much of an opioid. Try other ways to reduce pain. · Relax, and reduce stress. Relaxation techniques such as deep breathing or meditation can help. · Keep moving. Gentle, daily exercise can help reduce pain over the long run. Try low- or no-impact exercises such as walking, swimming, and stationary biking. Do stretches to stay flexible. · Try heat, cold packs, and massage. · Get enough sleep. Pain can make you tired and drain your energy. Talk with your doctor if you have trouble sleeping because of pain. · Think positive. Your thoughts can affect your pain level. Do things that you enjoy to distract yourself when you have pain instead of focusing on the pain. See a movie, read a book, listen to music, or spend time with a friend.  
If you are not taking a prescription pain medicine, ask your doctor if you can take an over-the-counter medicine. When should you call for help? Call your doctor now or seek immediate medical care if: 
  · You have a new kind of pain.  
  · You have new symptoms, such as a fever or rash, along with the pain.  
 Watch closely for changes in your health, and be sure to contact your doctor if: 
  · You think you might be using too much pain medicine, and you need help to use less or stop.  
  · Your pain gets worse.  
  · You would like a referral to a doctor or clinic that specializes in pain management. Where can you learn more? Go to http://ayse-fernandez.info/. Enter R108 in the search box to learn more about \"Safe Use of Opioid Pain Medicine: Care Instructions. \" Current as of: So 3, 2018 Content Version: 11.9 © 6365-3827 [employer]. Care instructions adapted under license by [employer] (which disclaims liability or warranty for this information). If you have questions about a medical condition or this instruction, always ask your healthcare professional. [place] [address] any warranty or liability for your use of this information. Safe Use of Opioid Pain Medicine: Care Instructions Your Care Instructions Pain is your body's way of warning you that something is wrong. Pain feels different for everybody. Only you can describe your pain. A doctor can suggest or prescribe many types of medicines for pain. These range from over-the-counter medicines like acetaminophen (Tylenol) to powerful medicines called opioids. Examples of opioids are fentanyl, hydrocodone, morphine, and oxycodone. Heroin is an illegal opioid Opioids are strong medicines. They can help you manage pain when you use them the right way. But if you misuse them, they can cause serious harm and even death. For these reasons, doctors are very careful about how they prescribe opioids. If you decide to take opioids, here are some things to remember. · Keep your doctor informed. You can get addicted to opioids. The risk is higher if you have a history of substance use. Your doctor will monitor you closely for signs of misuse and addiction and to figure out when you no longer need to take opioids. · Make a treatment plan. The goal of your plan is to be able to function and do the things you need to do, even if you still have some pain. You might be able to manage your pain with other non-opioid options like physical therapy, relaxation, or over-the-counter pain medicines. · Be aware of the side effects. Opioids can cause serious side effects, such as constipation, dry mouth, and nausea. And over time, you may need a higher dose to get pain relief. This is called tolerance. Your body also gets used to opioids. This is called physical dependence. If you suddenly stop taking them, you may have withdrawal symptoms. The doctor carefully considered what pain medicine is right for you. You may not have received opioids if your doctor was concerned about drug interactions or your safety, or if he or she had other concerns. It is best to have one doctor or clinic treat your pain. This way you will get the pain medicine that will help you the most. And a doctor will be able to watch for any problems that the medicine might cause. The doctor has checked you carefully, but problems can develop later. If you notice any problems or new symptoms,  get medical treatment right away. Follow-up care is a key part of your treatment and safety. Be sure to make and go to all appointments, and call your doctor if you are having problems. It's also a good idea to know your test results and keep a list of the medicines you take. How can you care for yourself at home? If you need to take opioids to manage your pain, remember these safety tips. · Follow directions carefully.  It's easy to misuse opioids if you take a dose other than what's prescribed by your doctor. This can lead to overdose and even death. Even sharing them with someone they weren't meant for is misuse. · Be cautious. Opioids may affect your judgment and decision making. Do not drive or operate machinery until you can think clearly. Talk with your doctor about when it is safe to drive. · Reduce the risk of drug interactions. Opioids can be dangerous if you take them with alcohol or with certain drugs like sleeping pills and muscle relaxers. Make sure your doctor knows about all the other medicines you take, including over-the-counter medicines. Don't start any new medicines before you talk to your doctor or pharmacist. 
· Safely store and dispose of opioids. Store opioids in a safe and secure place. Make sure that pets, children, friends, and family can't get to them. When you're done using opioids, make sure to dispose of them safely and as quickly as possible. The U.S. Food and Drug Administration (FDA) recommends these disposal options. ? The best option is to take your medicine to a drop-off box or take-back program that is authorized by the [facility] (MARYANN). ? If these programs aren't available in your area and your medicine doesn't have specific disposal instructions (such as flushing), you can throw them into your household trash if you follow the FDA's instructions. Visit fda.gov and search for \"unused medicine disposal.\" 
? If you have opioid patches (used or unused), your options are to take them to a MARYANN-authorized site or flush them down the toilet. Do not throw them in the trash. ? Only flush your medicine down the toilet if you can't get to a MARYANN-approved site or your medicine instructions state clearly to flush them. · Reduce the risk of overdose. Misuse of opioids can be very dangerous. Protect yourself by asking your doctor about a naloxone rescue kit.  It can help youand even save your lifeif you take too much of an opioid. Try other ways to reduce pain. · Relax, and reduce stress. Relaxation techniques such as deep breathing or meditation can help. · Keep moving. Gentle, daily exercise can help reduce pain over the long run. Try low- or no-impact exercises such as walking, swimming, and stationary biking. Do stretches to stay flexible. · Try heat, cold packs, and massage. · Get enough sleep. Pain can make you tired and drain your energy. Talk with your doctor if you have trouble sleeping because of pain. · Think positive. Your thoughts can affect your pain level. Do things that you enjoy to distract yourself when you have pain instead of focusing on the pain. See a movie, read a book, listen to music, or spend time with a friend. If you are not taking a prescription pain medicine, ask your doctor if you can take an over-the-counter medicine. When should you call for help? Call your doctor now or seek immediate medical care if: 
  · You have a new kind of pain.  
  · You have new symptoms, such as a fever or rash, along with the pain.  
 Watch closely for changes in your health, and be sure to contact your doctor if: 
  · You think you might be using too much pain medicine, and you need help to use less or stop.  
  · Your pain gets worse.  
  · You would like a referral to a doctor or clinic that specializes in pain management. Where can you learn more? Go to http://ayse-fernandez.info/. Enter R108 in the search box to learn more about \"Safe Use of Opioid Pain Medicine: Care Instructions. \" Current as of: So 3, 2018 Content Version: 11.9 © 5742-2305 [employer]. Care instructions adapted under license by [employer] (which disclaims liability or warranty for this information).  If you have questions about a medical condition or this instruction, always ask your healthcare professional. [employer] disclaims any warranty or liability for your use of this information.

## 2019-06-17 ENCOUNTER — OFFICE VISIT (OUTPATIENT)
Dept: INTERNAL MEDICINE CLINIC | Age: 84
End: 2019-06-17

## 2019-06-17 VITALS
HEIGHT: 63 IN | SYSTOLIC BLOOD PRESSURE: 147 MMHG | TEMPERATURE: 97.7 F | WEIGHT: 150 LBS | BODY MASS INDEX: 26.58 KG/M2 | HEART RATE: 84 BPM | DIASTOLIC BLOOD PRESSURE: 61 MMHG | OXYGEN SATURATION: 97 % | RESPIRATION RATE: 18 BRPM

## 2019-06-17 DIAGNOSIS — E11.21 TYPE 2 DIABETES WITH NEPHROPATHY (HCC): Primary | ICD-10-CM

## 2019-06-17 DIAGNOSIS — I10 ESSENTIAL HYPERTENSION: ICD-10-CM

## 2019-06-17 DIAGNOSIS — E78.00 HYPERCHOLESTEROLEMIA: ICD-10-CM

## 2019-06-17 DIAGNOSIS — M96.1 POSTLAMINECTOMY SYNDROME, LUMBAR REGION: ICD-10-CM

## 2019-06-17 RX ORDER — VALSARTAN AND HYDROCHLOROTHIAZIDE 320; 25 MG/1; MG/1
TABLET, FILM COATED ORAL
Refills: 5 | COMMUNITY
Start: 2019-06-09

## 2019-06-17 NOTE — PROGRESS NOTES
ROOM # 4 Yanely Pal presents today for Chief Complaint Patient presents with  Hypertension [facility] Annual Wellness Visit Yanely Pal preferred language for health care discussion is english/other. Is someone accompanying this pt? son Is the patient using any DME equipment during OV? Rollator Depression Screening: 
3 most recent [facility] Screens 5/7/2019 3/12/2019 1/21/2019 12/17/2018 10/2/2018 8/16/2018 8/8/2018 Little interest or pleasure in doing things Not at all Not at all Not at all Not at all Not at all Not at all Not at all Feeling down, depressed, irritable, or hopeless Not at all Not at all Not at all Not at all Not at all Not at all Not at all Total Score PHQ 2 0 0 0 0 0 0 0 Learning Assessment: 
Learning Assessment 1/21/2019 8/8/2018 1/25/2018 5/26/2015 12/26/2013 PRIMARY LEARNER Child(mary) Patient Patient Patient Patient HIGHEST LEVEL OF EDUCATION - PRIMARY LEARNER  4 YEARS OF COLLEGE GRADUATED HIGH SCHOOL OR GED GRADUATED HIGH SCHOOL OR GED GRADUATED HIGH SCHOOL OR GED -  
BARRIERS PRIMARY LEARNER COGNITIVE HEARING HEARING NONE -  
CO-LEARNER CAREGIVER Yes Yes Yes Yes -  
CO-LEARNER NAME OWNCZR UTOMQV VJESAZ CHKYPN           VVAWBY SQBCEP QHINTK VFUIDD -  
CO-LEARNER HIGHEST LEVEL OF EDUCATION - > 4 YEARS OF COLLEGE > 4 YEARS OF COLLEGE - -  
BARRIERS CO-LEARNER - NONE NONE - - PRIMARY LANGUAGE ENGLISH ENGLISH ENGLISH ENGLISH ENGLISH  
PRIMARY LANGUAGE CO-LEARNER - ENGLISH ENGLISH - - LEARNER PREFERENCE PRIMARY DEMONSTRATION LISTENING LISTENING READING LISTENING  
LEARNER PREFERENCE CO-LEARNER - DEMONSTRATION - - -  
ANSWERED BY son self self patient patient RELATIONSHIP OTHER SELF SELF SELF SELF Abuse Screening: 
Abuse Screening Questionnaire 4/22/2019 1/21/2019 8/8/2018 1/25/2018 11/30/2017 Do you ever feel afraid of your partner? Y N N N N Are you in a relationship with someone who physically or mentally threatens you?  Y N N N N  
 Is it safe for you to go home? Anupama Blanco Fall Risk Fall Risk Assessment, last 12 mths 5/7/2019 4/22/2019 1/21/2019 12/17/2018 8/16/2018 8/8/2018 1/25/2018 Able to walk? Yes Yes Yes Yes Yes Yes Yes Fall in past 12 months? Yes Yes Yes Yes Yes No No  
Fall with injury? No No No No No - - Number of falls in past 12 months 1 1 1 1 1 - - Fall Risk Score 1 1 1 1 1 - - Health Maintenance reviewed and discussed per provider. Yes 
 
Alicia Love is due for Health Maintenance Due Topic Date Due  Shingrix Vaccine Age 50> (1 of 2) 01/26/1976  MICROALBUMIN Q1  10/19/2017  
 HEMOGLOBIN A1C Q6M  06/19/2019 Please order/place referral if appropriate. Advance Directive: 1. Do you have an advance directive in place? Patient Reply: no 
 
2. If not, would you like material regarding how to put one in place? Patient Reply: no 
 
Coordination of Care: 1. Have you been to the ER, urgent care clinic since your last visit? Hospitalized since your last visit? no 
 
2. Have you seen or consulted any other health care providers outside of the [address] [place] [address] since your last visit? Include any pap smears or colon screening.  DR. [place] Prom

## 2019-06-17 NOTE — PROGRESS NOTES
HISTORY OF PRESENT ILLNESS Zora Rayo is a 80 y.o. female. Visit Vitals /61 (BP 1 Location: Left arm, BP Patient Position: Sitting) Pulse 84 Temp 97.7 °F (36.5 °C) (Oral) Resp 18 Ht 5' 3\" (1.6 m) Wt 150 lb (68 kg) SpO2 97% BMI 26.57 kg/m² Pt has dementia and has difficulty with history. Son states she had her biliary drainage tube changed recently Son states she has c/o an episode of breathing concerns. But she can not recall complaining of this Hypertension The history is provided by the patient. This is a chronic problem. The current episode started more than 1 week ago. The problem has not changed since onset. Review of Systems Constitutional: Negative. Respiratory: Negative. Cardiovascular: Negative. Psychiatric/Behavioral: Positive for memory loss. Physical Exam  
Constitutional: She appears well-developed and well-nourished. No distress. HENT:  
Small amount of cerumen curetted from right ear Cardiovascular: Normal rate and regular rhythm. Murmur heard. Pulmonary/Chest: Effort normal and breath sounds normal.  
Musculoskeletal: She exhibits no edema. Neurological: She is alert. Conversant but slow. Obvious cognitive impairment. Skin: Skin is warm and dry. She is not diaphoretic. Psychiatric: She has a normal mood and affect. Nursing note and vitals reviewed. ASSESSMENT and PLAN 
  ICD-10-CM ICD-9-CM 1. Type 2 diabetes with nephropathy (HCC) E11.21 250.40   
  583.81   
2. Essential hypertension I10 401.9 3. Hypercholesterolemia E78.00 272.0 4. Postlaminectomy syndrome, lumbar region M96.1 722.83   
 
 
BP acceptable for her age Update lab Offered and declined CXR at this time --last one I could find was 4/2017 at [facility]. Reviewed meds--she was on BOTH 320/25 AND 80/12.5 diovan. It is not clear why. The higher dose was to be stopped after April hospitalization Will stop the 80/12.5 as her BP is about where we want it. Discussed BMI/weight, lifestyle, diet and exercise. Discussed effect on blood pressure, blood sugar, and joints especially Focus on limiting white carbs, portion control, and moving more. She is fine for her age. Continue the amlodipine 10 mg and metoprolol 25 mg BID 
 
F/u 4 months for HTN, DM, dyspnea

## 2019-06-24 ENCOUNTER — TELEPHONE (OUTPATIENT)
Dept: INTERNAL MEDICINE CLINIC | Age: 84
End: 2019-06-24

## 2019-06-24 NOTE — TELEPHONE ENCOUNTER
Patient [facility]) Mrs Allen Nadia requesting a  CXR order Mrs. Kemp stated on her last OV 06/17 patient was offered CXR but decline but now she would like to do it.

## 2019-06-24 NOTE — TELEPHONE ENCOUNTER
Where does he want it done? (since it is hard to get her out.   She usually goes to [facility])

## 2019-06-25 NOTE — TELEPHONE ENCOUNTER
Return call made to pt's son Bryanna Mcclendon. Bryanna Danelle declined CXR again stating that pt is feeling better.     I verbalized understanding

## 2019-07-08 ENCOUNTER — PATIENT OUTREACH (OUTPATIENT)
Dept: INTERNAL MEDICINE CLINIC | Age: 84
End: 2019-07-08

## 2019-07-12 DIAGNOSIS — G89.4 CHRONIC PAIN SYNDROME: ICD-10-CM

## 2019-07-12 DIAGNOSIS — G89.29 CHRONIC BILATERAL LOW BACK PAIN, WITH SCIATICA PRESENCE UNSPECIFIED: ICD-10-CM

## 2019-07-12 DIAGNOSIS — M96.1 LUMBAR POST-LAMINECTOMY SYNDROME: ICD-10-CM

## 2019-07-12 DIAGNOSIS — M54.5 CHRONIC BILATERAL LOW BACK PAIN, WITH SCIATICA PRESENCE UNSPECIFIED: ICD-10-CM

## 2019-07-12 NOTE — TELEPHONE ENCOUNTER
Jung Nichole has called requesting a refill of their controlled medication, Morphine, for the management of chronic pain. Last office visit date: 5/7/19  Last opioid care agreement 3/19  Last UDS was done 5/19    Date last  was pulled and reviewed : 7/12/19  Last fill date for medication was 6/11/19    Was the patient compliant when the above report was pulled? yes    Analgesia: 80-90%    Aberrancies: none    ADL's: sister helps with her care due to her age. Adverse Reaction: none    Provider's last note and plan of care reviewed? yes  Request forwarded to provider for review.

## 2019-07-15 RX ORDER — MORPHINE SULFATE 15 MG/1
15 TABLET ORAL
Qty: 150 TAB | Refills: 0 | Status: SHIPPED | OUTPATIENT
Start: 2019-07-15 | End: 2019-08-14

## 2019-07-15 NOTE — TELEPHONE ENCOUNTER
Spoke with Michael Beavers who is on [address] form for patient - Mom was asleep - and let Aster Bah know that patient has a prescription ready to pickup at office and he stated he will come and let it before 3:45pm today.

## 2019-08-27 ENCOUNTER — OFFICE VISIT (OUTPATIENT)
Dept: PAIN MANAGEMENT | Age: 84
End: 2019-08-27

## 2019-08-27 VITALS
SYSTOLIC BLOOD PRESSURE: 129 MMHG | TEMPERATURE: 97.1 F | OXYGEN SATURATION: 92 % | HEIGHT: 63 IN | WEIGHT: 150 LBS | RESPIRATION RATE: 16 BRPM | HEART RATE: 71 BPM | DIASTOLIC BLOOD PRESSURE: 52 MMHG | BODY MASS INDEX: 26.58 KG/M2

## 2019-08-27 DIAGNOSIS — M54.5 CHRONIC BILATERAL LOW BACK PAIN, WITH SCIATICA PRESENCE UNSPECIFIED: Primary | ICD-10-CM

## 2019-08-27 DIAGNOSIS — M96.1 LUMBAR POST-LAMINECTOMY SYNDROME: ICD-10-CM

## 2019-08-27 DIAGNOSIS — G89.29 CHRONIC BILATERAL LOW BACK PAIN, WITH SCIATICA PRESENCE UNSPECIFIED: Primary | ICD-10-CM

## 2019-08-27 DIAGNOSIS — G89.4 CHRONIC PAIN SYNDROME: ICD-10-CM

## 2019-08-27 DIAGNOSIS — Z79.899 ENCOUNTER FOR LONG-TERM (CURRENT) USE OF HIGH-RISK MEDICATION: ICD-10-CM

## 2019-08-27 RX ORDER — MORPHINE SULFATE 15 MG/1
TABLET ORAL
COMMUNITY
End: 2019-08-27 | Stop reason: SDUPTHER

## 2019-08-27 RX ORDER — MORPHINE SULFATE 15 MG/1
15 TABLET ORAL
Qty: 140 TAB | Refills: 0 | Status: SHIPPED | OUTPATIENT
Start: 2019-08-27 | End: 2019-09-30 | Stop reason: SDUPTHER

## 2019-08-27 NOTE — PATIENT INSTRUCTIONS
Learning About Sleeping Well  What does sleeping well mean? Sleeping well means getting enough sleep. How much sleep is enough varies among people. The number of hours you sleep is not as important as how you feel when you wake up. If you do not feel refreshed, you probably need more sleep. Another sign of not getting enough sleep is feeling tired during the day. The average total nightly sleep time is 7½ to 8 hours. Healthy adults may need a little more or a little less than this. Why is getting enough sleep important? Getting enough quality sleep is a basic part of good health. When your sleep suffers, your mood and your thoughts can suffer too. You may find yourself feeling more grumpy or stressed. Not getting enough sleep also can lead to serious problems, including injury, accidents, anxiety, and depression. What might cause poor sleeping? Many things can cause sleep problems, including:  · Stress. Stress can be caused by fear about a single event, such as giving a speech. Or you may have ongoing stress, such as worry about work or school. · Depression, anxiety, and other mental or emotional conditions. · Changes in your sleep habits or surroundings. This includes changes that happen where you sleep, such as noise, light, or sleeping in a different bed. It also includes changes in your sleep pattern, such as having jet lag or working a late shift. · Health problems, such as pain, breathing problems, and restless legs syndrome. · Lack of regular exercise. How can you help yourself? Here are some tips that may help you sleep more soundly and wake up feeling more refreshed. Your sleeping area  · Use your bedroom only for sleeping and sex. A bit of light reading may help you fall asleep. But if it doesn't, do your reading elsewhere in the house. Don't watch TV in bed. · Be sure your bed is big enough to stretch out comfortably, especially if you have a sleep partner.   · Keep your bedroom quiet, dark, and cool. Use curtains, blinds, or a sleep mask to block out light. To block out noise, use earplugs, soothing music, or a \"white noise\" machine. Your evening and bedtime routine  · Create a relaxing bedtime routine. You might want to take a warm shower or bath, listen to soothing music, or drink a cup of noncaffeinated tea. · Go to bed at the same time every night. And get up at the same time every morning, even if you feel tired. What to avoid  · Limit caffeine (coffee, tea, caffeinated sodas) during the day, and don't have any for at least 4 to 6 hours before bedtime. · Don't drink alcohol before bedtime. Alcohol can cause you to wake up more often during the night. · Don't smoke or use tobacco, especially in the evening. Nicotine can keep you awake. · Don't take naps during the day, especially close to bedtime. · Don't lie in bed awake for too long. If you can't fall asleep, or if you wake up in the middle of the night and can't get back to sleep within 15 minutes or so, get out of bed and go to another room until you feel sleepy. · Don't take medicine right before bed that may keep you awake or make you feel hyper or energized. Your doctor can tell you if your medicine may do this and if you can take it earlier in the day. If you can't sleep  · Imagine yourself in a peaceful, pleasant scene. Focus on the details and feelings of being in a place that is relaxing. · Get up and do a quiet or boring activity until you feel sleepy. · Don't drink any liquids after 6 p.m. if you wake up often because you have to go to the bathroom. Where can you learn more? Go to http://ayse-fernandez.info/. Enter N462 in the search box to learn more about \"Learning About Sleeping Well. \"  Current as of: September 11, 2018  Content Version: 12.1  © 7418-6123 Healthwise, [employer].  Care instructions adapted under license by [employer] (which disclaims liability or warranty for this information). If you have questions about a medical condition or this instruction, always ask your healthcare professional. [address] any warranty or liability for your use of this information. Safe Use of Opioid Pain Medicine: Care Instructions  Your Care Instructions  Pain is your body's way of warning you that something is wrong. Pain feels different for everybody. Only you can describe your pain. A doctor can suggest or prescribe many types of medicines for pain. These range from over-the-counter medicines like acetaminophen (Tylenol) to powerful medicines called opioids. Examples of opioids are fentanyl, hydrocodone, morphine, and oxycodone. Heroin is an illegal opioid  Opioids are strong medicines. They can help you manage pain when you use them the right way. But if you misuse them, they can cause serious harm and even death. For these reasons, doctors are very careful about how they prescribe opioids. If you decide to take opioids, here are some things to remember. · Keep your doctor informed. You can develop opioid use disorder. Moderate to severe opioid use disorder is sometimes called addiction. The risk is higher if you have a history of substance use. Your doctor will monitor you closely for signs of opioid use disorder and to figure out when you no longer need to take opioids. · Make a treatment plan. The goal of your plan is to be able to function and do the things you need to do, even if you still have some pain. You might be able to manage your pain with other non-opioid options like physical therapy, relaxation, or over-the-counter pain medicines. · Be aware of the side effects. Opioids can cause serious side effects, such as constipation, dry mouth, and nausea. And over time, you may need a higher dose to get pain relief. This is called tolerance. Your body also gets used to opioids. This is called physical dependence.  If you suddenly stop taking them, you may have withdrawal symptoms. The doctor carefully considered what pain medicine is right for you. You may not have received opioids if your doctor was concerned about drug interactions or your safety, or if he or she had other concerns. It is best to have one doctor or clinic treat your pain. This way you will get the pain medicine that will help you the most. And a doctor will be able to watch for any problems that the medicine might cause. The doctor has checked you carefully, but problems can develop later. If you notice any problems or new symptoms,  get medical treatment right away. Follow-up care is a key part of your treatment and safety. Be sure to make and go to all appointments, and call your doctor if you are having problems. It's also a good idea to know your test results and keep a list of the medicines you take. How can you care for yourself at home? If you need to take opioids to manage your pain, remember these safety tips. · Follow directions carefully. It's easy to misuse opioids if you take a dose other than what's prescribed by your doctor. This can lead to overdose and even death. Even sharing them with someone they weren't meant for is misuse. · Be cautious. Opioids may affect your judgment and decision making. Do not drive or operate machinery until you can think clearly. Talk with your doctor about when it is safe to drive. · Reduce the risk of drug interactions. Opioids can be dangerous if you take them with alcohol or with certain drugs like sleeping pills and muscle relaxers. Make sure your doctor knows about all the other medicines you take, including over-the-counter medicines. Don't start any new medicines before you talk to your doctor or pharmacist.  · Safely store and dispose of opioids. Store opioids in a safe and secure place. Make sure that pets, children, friends, and family can't get to them.  When you're done using opioids, make sure to dispose of them safely and as quickly as possible. The U.S. Food and Drug Administration (FDA) recommends these disposal options. ? The best option is to take your medicine to a drop-off box or take-back program that is authorized by the [address] ([facility]). ? If these programs aren't available in your area and your medicine doesn't have specific disposal instructions (such as flushing), you can throw them into your household trash if you follow the FDA's instructions. Visit fda.gov and search for \"unused medicine disposal.\"  ? If you have opioid patches (used or unused), your options are to take them to a [place]-authorized site or flush them down the toilet. Do not throw them in the trash. ? Only flush your medicine down the toilet if you can't get to a [place]-approved site or your medicine instructions state clearly to flush them. · Reduce the risk of overdose. Misuse of opioids can be very dangerous. Protect yourself by asking your doctor about a naloxone rescue kit. It can help you--and even save your life--if you take too much of an opioid. Try other ways to reduce pain. · Relax, and reduce stress. Relaxation techniques such as deep breathing or meditation can help. · Keep moving. Gentle, daily exercise can help reduce pain over the long run. Try low- or no-impact exercises such as walking, swimming, and stationary biking. Do stretches to stay flexible. · Try heat, cold packs, and massage. · Get enough sleep. Pain can make you tired and drain your energy. Talk with your doctor if you have trouble sleeping because of pain. · Think positive. Your thoughts can affect your pain level. Do things that you enjoy to distract yourself when you have pain instead of focusing on the pain. See a movie, read a book, listen to music, or spend time with a friend. If you are not taking a prescription pain medicine, ask your doctor if you can take an over-the-counter medicine. When should you call for help?   Call your doctor now or seek immediate medical care if:    · You have a new kind of pain.     · You have new symptoms, such as a fever or rash, along with the pain.    Watch closely for changes in your health, and be sure to contact your doctor if:    · You think you might be using too much pain medicine, and you need help to use less or stop.     · Your pain gets worse.     · You would like a referral to a doctor or clinic that specializes in pain management. Where can you learn more? Go to http://ayse-fernandez.info/. Enter R108 in the search box to learn more about \"Safe Use of Opioid Pain Medicine: Care Instructions. \"  Current as of: March 28, 2019  Content Version: 12.1  © 0056-2300 Healthwise, [employer]. Care instructions adapted under license by [employer] (which disclaims liability or warranty for this information). If you have questions about a medical condition or this instruction, always ask your healthcare professional. [place] [address] any warranty or liability for your use of this information.

## 2019-08-27 NOTE — PROGRESS NOTES
Referred before 10/7/11, source PCP and for low back pain, hip pain, leg and foot pain. 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 Pt was last seen here on 05/07/2019. Pt denies interval changes on the character or distribution of pain. Pain is located gluteal region and elias lower extremities. The pain is described as aching. Pain at its best is 1/10. Pain at its worse is 2/10. Symptoms are improved by Ibuprofen 800 mg. Son reports patient does not use lidocaine ointment, diclofenac gel (mildly effective) has not used in a while. Patient lives with her son and her daughter which are her caregivers they cook and clean for her but allow her to dress herself and other independent activities to keep her as independent as possible. Patient son reports she has done extremely well with the decrease in her opioid medication with no extra pain no side effects. Since last visit, patient was recently in hospital to have her biliary tube changed within the last  week. ROS:  Denies fever, chills, nausea, vomiting, diarrhea, constipation, abdominal pain, chest pain, shortness or breath/trouble breathing, weakness, trouble swallowing, changes in vision, changes in hearing, falls, dizziness, bladder incontinence, bowel incontinence, depression, anxiety, suicidal ideations, homicidal ideations or alcohol use.     Opioid specific risk:DM, GERD, dementia, advanced age  Opioid specific history: concurrent use of opioid since before 2011, with no opioid holiday      Vitals:    08/27/19 0838   BP: 129/52   Pulse: 71   Resp: 16   Temp: 97.1 °F (36.2 °C)   TempSrc: Oral   SpO2: 92%   Weight: 68 kg (150 lb)   Height: 5' 3\" (1.6 m)   PainSc:   1   PainLoc: Back        Physical exam:  Constitutional  -  AFVSS, no acute distress,overweight body habitus. A&OXs 3. Speech is clear and appropriate. HEENT -  Normocephalic, atraumatic. Conjugate gaze, clear sclerae. EOM         intact. Neuro/Psych -  Mood is appropriate and patient is cooperative. Gait is within functional limits. Balance is within functional limits. Respiratory -  Non-labored breathing. Even rise of chest wall. Patient present with rolling seated walker for gait and balance      Primary Care Physician  Maria A Cantrell [address]  [phone]      PHQ -- .  3 most recent [facility] Screens 8/27/2019   Little interest or pleasure in doing things Not at all   Feeling down, depressed, irritable, or hopeless Not at all   Total Score PHQ 2 0         Assessment/Plan:     ICD-10-CM ICD-9-CM    1. Chronic bilateral low back pain, with sciatica presence unspecified M54.5 724.2 morphine IR (MS IR) 15 mg tablet    G89.29 338.29    2. Lumbar post-laminectomy syndrome M96.1 722.83    3. Chronic pain syndrome G89.4 338.4 morphine IR (MS IR) 15 mg tablet   4. Encounter for long-term (current) use of high-risk medication Z79.899 V58.69         1) Medications (opiod and non-opiod)  --I do not recommend long term opioid therapy for this patient at this time for their chronic pain; the risks outweigh the potential benefits. Pt currently taking morphine IR 15 mg tablets 1-2 tablet up to 3 times daily as needed total of 5 tabs daily with 150 tabs for 30 days. Their new MME is 75. --Today, we will continue the weaning of patients opioid medication with a goal of being opioid free, pending safety and compliance.  Pt was educated on signs and symptoms of opioid withdrawal and advised to call the clinic should these symptoms arise so that we may provide support as needed. Pt given prescription for morphine IR 15 mg tablets 1 tablet up to 5 times daily with 140 tabs for 30 days. Their new MME will be 60. --At next office visit, the plan is to provide patient with morphine IR 15 mg tablets 1 tablet up to 5 times daily with a total of 130 tablets for 30 days. Their new MME will be 60. If patient has difficulty with the wean or difficulty with cravings we will consider referral to mental health for ongoing assessment and treatment for opioid use disorder. --Continue topicals as needed, patient has these available but uses them very rarely    -Continue Tylenol 500 mg tablets 1-2 tabs up to twice daily as needed with a total max daily dose 2000 mg.    --Continue Aleve as needed    2) Restorative Therapies  --Patient to continue to be as independent as possible to keep her mobility    --Consider nex wave, Updated imaging. --Follow up ongoing assessment and ongoing development of integrative and comprehensive plan of care for chronic pain. Goals: To establish complementary and integrative plan of care to address chronic pain issues while minimizing pharmaceuticals to maximize patient's function improve quality of life. Education:  Patient again educated on the importance of strict compliance with the opioid care agreement while on opioid therapy. Patient also again educated that they should avoid driving while on chronic opioid therapy. Also advised to avoid alcohol and to avoid benzodiazepines while on opioid therapy. Handouts given regarding opioid safety and sleep health. Follow-up and Dispositions    · Return in about 3 months (around 11/27/2019).               Social History     Socioeconomic History    Marital status:      Spouse name: Not on file    Number of children: Not on file    Years of education: Not on file    Highest education level: Not on file   Occupational History    Not on file   Social Needs    Financial resource strain: Not on file    Food insecurity:     Worry: Not on file     Inability: Not on file    Transportation needs:     Medical: Not on file     Non-medical: Not on file   Tobacco Use    Smoking status: Never Smoker    Smokeless tobacco: Never Used   Substance and Sexual Activity    Alcohol use: No    Drug use: No    Sexual activity: Never   Lifestyle    Physical activity:     Days per week: Not on file     Minutes per session: Not on file    Stress: Not on file   Relationships    Social connections:     Talks on phone: Not on file     Gets together: Not on file     Attends Buddhist service: Not on file     Active member of club or organization: Not on file     Attends meetings of clubs or organizations: Not on file     Relationship status: Not on file    Intimate partner violence:     Fear of current or ex partner: Not on file     Emotionally abused: Not on file     Physically abused: Not on file     Forced sexual activity: Not on file   Other Topics Concern    Not on file   Social History Narrative    Not on file     Family History   Problem Relation Age of Onset    Hypertension Mother     Hypertension Father     Stroke Father      Allergies   Allergen Reactions    Aspirin Nausea Only and Unknown (comments)     Other reaction(s): gi distress     Past Medical History:   Diagnosis Date    Abdominal pain, other specified site 10/11    with abnml LFT, no etiology found    Abnormal LFTs     Anemia     Bile duct stone 11/13    Dr. Lawson Neither of great toe of right foot 3/1/2013    Cholangitis 4/14/16    Chondromalacia of right shoulder 11/6/2014    Colon polyps     CRI (chronic renal insufficiency)     CVA (cerebral infarction)     LACUNAR    DDD (degenerative disc disease), lumbar 11/6/2014    Dementia     Diabetes ([facility] [address]     DJD (degenerative joint disease) of lumbar spine 1994    DJD (degenerative joint disease), multiple sites 11/6/2014    Gallstone pancreatitis     stent placed, June 2012    GERD (gastroesophageal reflux disease)     History of bone density study 1/07    wnl    Hypercholesterolemia     Hypertension     Lumbar arthropathy 11/6/2014    Lumbar nerve root impingement 11/6/2014    Lumbar post-laminectomy syndrome 11/6/2014    Pancolonic diverticulosis     Polyarthralgia 11/6/2014    Post laminectomy syndrome 2002    S/P lumbar spinal fusion 11/6/2014    Sacroiliitis ([address] 11/6/2014    Spondylolisthesis of lumbar region 11/6/2014    Subscapularis tendinitis of right shoulder 11/6/2014     Past Surgical History:   Procedure Laterality Date    COLONOSCOPY  5/29/12    Dr. Karene Galeazzi, single polyp, 5 yr f/u    HX BUNIONECTOMY      LEFT    HX CARPAL TUNNEL RELEASE  2000    HX CHOLECYSTECTOMY  12/09    GALLSTONE PANCREATITIS    HX LUMBAR LAMINECTOMY  1994    HX TONSIL AND ADENOIDECTOMY       Current Outpatient Medications on File Prior to Visit   Medication Sig    potassium chloride (K-DUR, KLOR-CON) 20 mEq tablet TAKE 1 TABLET BY MOUTH TWICE DAILY    valsartan-hydroCHLOROthiazide (DIOVAN-HCT) 320-25 mg per tablet TAKE 1 TABLET BY MOUTH DAILY    furosemide (LASIX) 40 mg tablet TAKE 1 TABLET BY MOUTH DAILY (Patient taking differently: daily as needed.)    valsartan-hydroCHLOROthiazide (DIOVAN-HCT) 320-25 mg per tablet take one tablet by mouth daily    lidocaine (XYLOCAINE) 5 % ointment USE DAILY AS NEEDED FOR DRESSING CHANGES    metoprolol tartrate (LOPRESSOR) 25 mg tablet Take 1 Tab by mouth two (2) times a day.  rivastigmine (EXELON) 9.5 mg/24 hr patch 1 Patch by TransDERmal route daily.     amLODIPine (NORVASC) 10 mg tablet TAKE 1 TABLET BY MOUTH EVERY DAY    ergocalciferol (ERGOCALCIFEROL) 50,000 unit capsule TAKE 1 CAPSULE BY MOUTH EVERY 7 DAYS    omeprazole (PRILOSEC) 40 mg capsule TAKE 1 CAPSULE BY MOUTH DAILY    tolterodine ER (DETROL LA) 4 mg ER capsule TAKE 1 CAPSULE BY MOUTH DAILY    ONETOUCH ULTRA TEST strip TEST BLOOD SUGAR DAILY OR AS DIRECTED    ursodiol (ACTIGALL) 300 mg capsule TAKE 1 CAPSULE BY MOUTH TWICE DAILY    cromolyn (OPTICROM) 4 % ophthalmic solution Administer 1 Drop to both eyes as needed.  Blood-Glucose Meter (ONE TOUCH ULTRA 2) monitoring kit Use to test blood sugar daily or as directed    Lancets (ONE TOUCH ULTRASOFT LANCETS) Misc Use to test blood sugar daily or as directed    naloxone (NARCAN) 4 mg/actuation nasal spray Use 1 spray intranasally into 1 nostril as needed for opioid respiratory emergency only. Indications: OPIATE-INDUCED RESPIRATORY DEPRESSION     No current facility-administered medications on file prior to visit. [address] was used for portions of this report. Unintended errors may occur.

## 2019-08-27 NOTE — PROGRESS NOTES
Nursing Notes    Patient presents to the office today in follow-up. Patient rates her pain at 1/10 on the numerical pain scale. Reviewed medications with counts as follows:    Rx Date filled Qty Dispensed Pill Count Last Dose Short   MS IR 15 mg tab 7/16/19 150 2 today no                                       reviewed YES  Any aberrancies noted on  NO  Last opioid agreement 3/12/19  Last urine drug screen 5/7/19 (oral)    Comments:     POC UDS was not performed in office today    Any new labs or imaging since last appointment? YES    Have you been to an emergency room (ER) or urgent care clinic since your last visit? NO            Have you been hospitalized since your last visit? NO     If yes, where, when, and reason for visit? Have you seen or consulted any other health care providers outside of the [address] [place] [address]  since your last visit? Karina Simpson Dr, patient had biliary tube replaced last week at [facility]. If yes, where, when, and reason for visit? Ms. Nydia Padron has a reminder for a \"due or due soon\" health maintenance. I have asked that she contact her primary care provider for follow-up on this health maintenance.       3 most recent PHQ Screens 8/27/2019   Little interest or pleasure in doing things Not at all   Feeling down, depressed, irritable, or hopeless Not at all   Total Score PHQ 2 0

## 2019-09-30 DIAGNOSIS — G89.29 CHRONIC BILATERAL LOW BACK PAIN, WITH SCIATICA PRESENCE UNSPECIFIED: ICD-10-CM

## 2019-09-30 DIAGNOSIS — M54.5 CHRONIC BILATERAL LOW BACK PAIN, WITH SCIATICA PRESENCE UNSPECIFIED: ICD-10-CM

## 2019-09-30 DIAGNOSIS — G89.4 CHRONIC PAIN SYNDROME: ICD-10-CM

## 2019-09-30 RX ORDER — MORPHINE SULFATE 15 MG/1
15 TABLET ORAL
Qty: 140 TAB | Refills: 0 | Status: SHIPPED | OUTPATIENT
Start: 2019-09-30 | End: 2019-10-30

## 2019-10-18 ENCOUNTER — OFFICE VISIT (OUTPATIENT)
Dept: INTERNAL MEDICINE CLINIC | Age: 84
End: 2019-10-18

## 2019-10-18 VITALS
WEIGHT: 155 LBS | BODY MASS INDEX: 27.46 KG/M2 | HEART RATE: 65 BPM | SYSTOLIC BLOOD PRESSURE: 130 MMHG | OXYGEN SATURATION: 93 % | HEIGHT: 63 IN | RESPIRATION RATE: 20 BRPM | DIASTOLIC BLOOD PRESSURE: 73 MMHG | TEMPERATURE: 97.6 F

## 2019-10-18 DIAGNOSIS — I10 ESSENTIAL HYPERTENSION: Chronic | ICD-10-CM

## 2019-10-18 DIAGNOSIS — Z23 ENCOUNTER FOR IMMUNIZATION: ICD-10-CM

## 2019-10-18 DIAGNOSIS — E78.00 HYPERCHOLESTEREMIA: Chronic | ICD-10-CM

## 2019-10-18 DIAGNOSIS — E11.21 TYPE 2 DIABETES WITH NEPHROPATHY (HCC): Primary | ICD-10-CM

## 2019-10-18 NOTE — PROGRESS NOTES
ROOM # 5    Sourav Ospina presents today for   Chief Complaint   Patient presents with    Hypertension    Cholesterol Problem    Diabetes       Sourav Ospina preferred language for health care discussion is english/other. Is someone accompanying this pt? son    Is the patient using any DME equipment during [address]? rollator    Depression Screening:  3 most recent [facility] Screens 8/27/2019 5/7/2019 3/12/2019 1/21/2019 12/17/2018 10/2/2018 8/16/2018   Little interest or pleasure in doing things Not at all Not at all Not at all Not at all Not at all Not at all Not at all   Feeling down, depressed, irritable, or hopeless Not at all Not at all Not at all Not at all Not at all Not at all Not at all   Total Score PHQ 2 0 0 0 0 0 0 0       Learning Assessment:  Learning Assessment 1/21/2019 8/8/2018 1/25/2018 5/26/2015 12/26/2013   PRIMARY LEARNER Child(mary) Patient Patient Patient Patient   HIGHEST LEVEL OF EDUCATION - PRIMARY LEARNER  [address] [place] [address] CAREGIVER Yes Yes Yes Yes -   Renny - > 4 YEARS OF COLLEGE > 4 YEARS OF COLLEGE - -   [facility]   PRIMARY LANGUAGE CO-LEARNER - ENGLISH ENGLISH - -   LEARNER PREFERENCE PRIMARY DEMONSTRATION LISTENING LISTENING READING LISTENING   LEARNER PREFERENCE CO-LEARNER - DEMONSTRATION - - -   ANSWERED BY son self self patient patient   RELATIONSHIP OTHER SELF SELF SELF SELF       Abuse Screening:  Abuse Screening Questionnaire 4/22/2019 1/21/2019 8/8/2018 1/25/2018 11/30/2017   Do you ever feel afraid of your partner? Y N N N N   Are you in a relationship with someone who physically or mentally threatens you?  Y N N N N Is it safe for you to go home? Justyna Sherwood       Fall Risk  Fall Risk Assessment, last 12 mths 8/27/2019 5/7/2019 4/22/2019 1/21/2019 12/17/2018 8/16/2018 8/8/2018   Able to walk? Yes Yes Yes Yes Yes Yes Yes   Fall in past 12 months? No Yes Yes Yes Yes Yes No   Fall with injury? - No No No No No -   Number of falls in past 12 months - 1 1 1 1 1 -   Fall Risk Score - 1 1 1 1 1 -           Health Maintenance reviewed and discussed per provider. Yes    Srinivas Marcum is due for   Health Maintenance Due   Topic Date Due    HEMOGLOBIN A1C Q6M  06/19/2019    Influenza Age 5 to Adult  08/01/2019     Please order/place referral if appropriate. Advance Directive:  1. Do you have an advance directive in place? Patient Reply: no    2. If not, would you like material regarding how to put one in place? Patient Reply: no    Coordination of Care:  1. Have you been to the ER, urgent care clinic since your last visit? Hospitalized since your last visit? yes    2. Have you seen or consulted any other health care providers outside of the [address] [place] [address] since your last visit? Include any pap smears or colon screening.  Digestive specialist

## 2019-10-18 NOTE — PROGRESS NOTES
HISTORY OF PRESENT ILLNESS  Andrew Rodrigez is a 80 y.o. female. Visit Vitals  /73 (BP 1 Location: Left arm, BP Patient Position: Sitting)   Pulse 65   Temp 97.6 °F (36.4 °C) (Oral)   Resp 20   Ht 5' 3\" (1.6 m)   Wt 155 lb (70.3 kg)   SpO2 93%   BMI 27.46 kg/m²       Recently had a partially dislodged biliary tube. It was repositioned easily and she has been doing fine. Current Outpatient Medications:  rivastigmine (EXELON) 9.5 mg/24 hr patch, APPLY 1 PATCH EXTERNALLY TO THE SKIN DAILY  morphine IR (MS IR) 15 mg tablet, Take 1 Tab by mouth five (5) times daily for 30 days. Max Daily Amount: 75 mg. 140 tabs to be budgeted over 30 days  potassium chloride (K-DUR, KLOR-CON) 20 mEq tablet, TAKE 1 TABLET BY MOUTH TWICE DAILY  valsartan-hydroCHLOROthiazide (DIOVAN-HCT) 320-25 mg per tablet, TAKE 1 TABLET BY MOUTH DAILY  furosemide (LASIX) 40 mg tablet, TAKE 1 TABLET BY MOUTH DAILY (Patient taking differently: daily as needed.)  lidocaine (XYLOCAINE) 5 % ointment, USE DAILY AS NEEDED FOR DRESSING CHANGES  metoprolol tartrate (LOPRESSOR) 25 mg tablet, Take 1 Tab by mouth two (2) times a day. amLODIPine (NORVASC) 10 mg tablet, TAKE 1 TABLET BY MOUTH EVERY DAY  ergocalciferol (ERGOCALCIFEROL) 50,000 unit capsule, TAKE 1 CAPSULE BY MOUTH EVERY 7 DAYS  omeprazole (PRILOSEC) 40 mg capsule, TAKE 1 CAPSULE BY MOUTH DAILY  tolterodine ER (DETROL LA) 4 mg ER capsule, TAKE 1 CAPSULE BY MOUTH DAILY  ONETOUCH ULTRA TEST strip, TEST BLOOD SUGAR DAILY OR AS DIRECTED  ursodiol (ACTIGALL) 300 mg capsule, TAKE 1 CAPSULE BY MOUTH TWICE DAILY  cromolyn (OPTICROM) 4 % ophthalmic solution, Administer 1 Drop to both eyes as needed.   Blood-Glucose Meter (ONE TOUCH ULTRA 2) monitoring kit, Use to test blood sugar daily or as directed  Lancets (ONE TOUCH ULTRASOFT LANCETS) Misc, Use to test blood sugar daily or as directed  valsartan-hydroCHLOROthiazide (DIOVAN-HCT) 320-25 mg per tablet, take one tablet by mouth daily  naloxone [facility]) 4 mg/actuation nasal spray, Use 1 spray intranasally into 1 nostril as needed for opioid respiratory emergency only. Indications: OPIATE-INDUCED RESPIRATORY DEPRESSION        Hypertension    The history is provided by the patient. This is a chronic problem. The current episode started more than 1 week ago. The problem has not changed since onset. Pertinent negatives include no chest pain, no palpitations and no shortness of breath. There are no associated agents to hypertension. Risk factors include postmenopause and obesity. Diabetes   The history is provided by the patient. This is a chronic problem. The current episode started more than 1 week ago. The problem occurs daily. The problem has not changed since onset. Pertinent negatives include no chest pain and no shortness of breath. Exacerbated by: diet. The symptoms are relieved by medications (diet). Treatments tried: see med list.       Review of Systems   Constitutional: Negative. Negative for chills and fever. Respiratory: Negative for shortness of breath. Cardiovascular: Negative for chest pain and palpitations. Gastrointestinal:        Appetite is good. Psychiatric/Behavioral:        Dementia stable. Physical Exam   Constitutional: She is oriented to person, place, and time. She appears well-developed and well-nourished. No distress. HENT:   Bilateral cerumen   Cardiovascular: Normal rate and regular rhythm. Pulmonary/Chest: Effort normal and breath sounds normal.   Musculoskeletal: She exhibits no edema. Neurological: She is alert and oriented to person, place, and time. Skin: Skin is warm and dry. She is not diaphoretic. Psychiatric: She has a normal mood and affect. Nursing note and vitals reviewed. ASSESSMENT and PLAN    ICD-10-CM ICD-9-CM    1.  Type 2 diabetes with nephropathy ([facility]) Y74.89 740.88 METABOLIC PANEL, COMPREHENSIVE     583.81 LIPID PANEL      MICROALBUMIN, UR, RAND W/ MICROALB/CREAT RATIO HEMOGLOBIN A1C W/O EAG   2. Essential hypertension X63 639.1 METABOLIC PANEL, COMPREHENSIVE      LIPID PANEL   3. Hypercholesteremia E78.00 272.0 LIPID PANEL   4. Encounter for immunization Z23 V03.89 INFLUENZA VACCINE INACTIVATED (IIV), SUBUNIT, ADJUVANTED, IM      ADMIN INFLUENZA VIRUS VAC       BP controlled.     DM has been good    Due for updated lab    F/u 4 months for HTN, DM,

## 2019-10-31 DIAGNOSIS — M96.1 LUMBAR POST-LAMINECTOMY SYNDROME: Primary | ICD-10-CM

## 2019-10-31 NOTE — TELEPHONE ENCOUNTER
Kevon Castellanos has called requesting a refill of their controlled medication, morphine sulfate IR, for the management of her chronic back pain. Last office visit date: 08/27/19  Last opioid care agreement 03/12/19  Last UDS was done 05/07/19    Date last  was pulled and reviewed : 10/31/19  Last fill date for medication was 09/30/19 for morphine sulfate IR 15 mg #140 tabs    Was the patient compliant when the above report was pulled? yes    Analgesia: pt reports a % pain relief with her current opioid medication regimen     Aberrancies: none noted     ADL's: pt reports that she is able to do her normal daily personal hygiene. The pt does not cook or clean because her kids do this for her. She lives with them. Adverse Reaction: none reported by the pt at this time. Provider's last note and plan of care reviewed? yes  Request forwarded to provider for review.

## 2019-11-01 ENCOUNTER — OFFICE VISIT (OUTPATIENT)
Dept: PAIN MANAGEMENT | Age: 84
End: 2019-11-01

## 2019-11-01 DIAGNOSIS — Z79.899 ENCOUNTER FOR LONG-TERM (CURRENT) USE OF HIGH-RISK MEDICATION: Primary | ICD-10-CM

## 2019-11-01 RX ORDER — MORPHINE SULFATE 15 MG/1
15 TABLET ORAL
Qty: 140 TAB | Refills: 0 | Status: SHIPPED | OUTPATIENT
Start: 2019-11-01 | End: 2019-11-19 | Stop reason: SDUPTHER

## 2019-11-01 NOTE — TELEPHONE ENCOUNTER
Patient identified using two patient identifiers (name and ); patient advised prescriptions are ready for pick-up. Patient also informed  hours are Mon-Fri [id number]. Patient verbalized understanding.

## 2019-11-19 ENCOUNTER — OFFICE VISIT (OUTPATIENT)
Dept: PAIN MANAGEMENT | Age: 84
End: 2019-11-19

## 2019-11-19 VITALS
HEIGHT: 63 IN | RESPIRATION RATE: 16 BRPM | DIASTOLIC BLOOD PRESSURE: 73 MMHG | SYSTOLIC BLOOD PRESSURE: 117 MMHG | WEIGHT: 155 LBS | TEMPERATURE: 98.3 F | BODY MASS INDEX: 27.46 KG/M2 | HEART RATE: 65 BPM

## 2019-11-19 DIAGNOSIS — G89.4 CHRONIC PAIN SYNDROME: ICD-10-CM

## 2019-11-19 DIAGNOSIS — M96.1 LUMBAR POST-LAMINECTOMY SYNDROME: ICD-10-CM

## 2019-11-19 DIAGNOSIS — Z79.899 ENCOUNTER FOR LONG-TERM (CURRENT) USE OF HIGH-RISK MEDICATION: ICD-10-CM

## 2019-11-19 DIAGNOSIS — M54.50 CHRONIC BILATERAL LOW BACK PAIN WITHOUT SCIATICA: Primary | ICD-10-CM

## 2019-11-19 DIAGNOSIS — G89.29 CHRONIC BILATERAL LOW BACK PAIN WITHOUT SCIATICA: Primary | ICD-10-CM

## 2019-11-19 RX ORDER — MORPHINE SULFATE 15 MG/1
15 TABLET ORAL
Qty: 140 TAB | Refills: 0 | Status: SHIPPED | OUTPATIENT
Start: 2019-12-08 | End: 2020-01-07

## 2019-11-19 NOTE — PROGRESS NOTES
Nursing Notes Patient presents to the office today in follow-up. Patient rates her pain at 1/10 on the numerical pain scale. Reviewed medications with counts as follows:   
Rx Date filled Qty Dispensed Pill Count Last Dose Short MS IR 15 mg tab 11/8/19 140 97 today no  
       
       
       
          
 reviewed YES Any aberrancies noted on  NO Last opioid agreement 3/12/19 Last urine drug screen 11/1/19 Comments: POC UDS was not performed in office today Any new labs or imaging since last appointment? YES Have you been to an emergency room (ER) or urgent care clinic since your last visit? YES Have you been hospitalized since your last visit? NO If yes, where, when, and reason for visit? Have you seen or consulted any other health care providers outside of the [address] [place] [address]  since your last visit? YES If yes, where, when, and reason for visit? Ms. Dorothea Tompkins has a reminder for a \"due or due soon\" health maintenance. I have asked that she contact her primary care provider for follow-up on this health maintenance. 3 most recent PHQ Screens 11/19/2019 Little interest or pleasure in doing things Not at all Feeling down, depressed, irritable, or hopeless Not at all Total Score PHQ 2 0

## 2019-11-19 NOTE — PROGRESS NOTES
Referred before 10/7/11, source PCP and for low back pain, hip pain, leg and foot pain. Calculated MEQ - 60 Naloxone rescue -yes Prophylactic bowel program -yes Date of last OCA 3/12/19 Last UDS 5/7/2019, consistent  date checked today, findings consistent Today   Last Visit  Prior Visit(s) ORT -        
PGIC -3 and 4  3 and 6 RONDA -44%  34% COMM - 4  6 HPI: 
Daniel Blanton is a 80 y.o. female here for f/u visit for ongoing evaluation of low back pain and lower extremity pain. Pt was last seen here on 08/27/2019. Pt denies interval changes on the character or distribution of pain. Pain is located gluteal region and elias lower extremities. The pain is described as aching. Pain at its best is 3/10. Pain at its worse is 9/10. Symptoms are improved by Ibuprofen 800 mg. Son reports patient does not use lidocaine ointment, diclofenac gel (mildly effective) has not used in a while. Patient lives with her son and her daughter which are her caregivers they cook and clean for her but allow her to dress herself and other independent activities to keep her as independent as possible. Patient son reports she has done extremely well with the decrease in her opioid medication with no extra pain no side effects. Since last visit, patient denies changes since last visit. ROS: 
Denies fever, chills, nausea, vomiting, diarrhea, constipation, abdominal pain, chest pain, shortness or breath/trouble breathing, weakness, trouble swallowing, changes in vision, changes in hearing, falls, dizziness, bladder incontinence, bowel incontinence, depression, anxiety, suicidal ideations, homicidal ideations or alcohol use. Opioid specific risk:DM, GERD, dementia, advanced age Opioid specific history: concurrent use of opioid since before 2011, with no opioid holiday Vitals:  
 11/19/19 0950 BP: 117/73 Pulse: 65 Resp: 16 Temp: 98.3 °F (36.8 °C) TempSrc: Oral  
 Weight: 70.3 kg (155 lb) Height: 5' 3\" (1.6 m) PainSc:   1 PainLoc: Generalized Physical exam: 
Constitutional  -  AFVSS, no acute distress,overweight body habitus. A&OXs 3. Speech is clear and appropriate. HEENT -  Normocephalic, atraumatic. Conjugate gaze, clear sclerae. EOM     
   intact. Neuro/Psych -  Mood is appropriate and patient is cooperative. Gait is within functional limits. Balance is within functional limits. Respiratory -  Non-labored breathing. Even rise of chest wall. Patient present with rolling seated walker for gait and balance Primary Care Physician Carlie Salinas [phone] [phone] PHQ -- . 
3 most recent PHQ Screens 11/19/2019 Little interest or pleasure in doing things Not at all Feeling down, depressed, irritable, or hopeless Not at all Total Score PHQ 2 0 Assessment/Plan: ICD-10-CM ICD-9-CM 1. Chronic bilateral low back pain without sciatica M54.5 724.2 REFERRAL TO PAIN MANAGEMENT  
 G89.29 338.29 2. Lumbar post-laminectomy syndrome M96.1 722.83 morphine IR (MS IR) 15 mg tablet REFERRAL TO PAIN MANAGEMENT 3. Chronic pain syndrome G89.4 338.4 REFERRAL TO PAIN MANAGEMENT 4. Encounter for long-term (current) use of high-risk medication Z79.899 V58.69   
  
 
1) Medications (opiod and non-opiod) Patient was provided with morphine IR 15 mg tablets 1 tablet up to 5 times daily with 140 tabs for 30 days. Their MME will be 60. Patient's family and herself were informed of plan clinic closure of 12/12/2019 at that this would be the last prescription narcotic provided from this clinic. Patient and son were educated on signs symptoms of opioid withdrawal in advised to go to the nearest emergency department for further support if needed. She will be referred to [facility] pain management for continuing of care.   Patient and her son were advised of calling primary care provider to inform them of the plan clinic closure so that they may anticipate providing support while awaiting establishment of the new clinic. --Continue topicals as needed, patient has these available but uses them very rarely Continue Tylenol 500 mg tablets 1-2 tabs up to twice daily as needed with a total max daily dose 2000 mg. 
 
--Continue Aleve as needed 2) Restorative Therapies 
--Patient to continue to be as independent as possible to keep her mobility 
 
--Consider nex wave, Updated imaging. --Follow up ongoing assessment and ongoing development of integrative and comprehensive plan of care for chronic pain. Goals: To establish complementary and integrative plan of care to address chronic pain issues while minimizing pharmaceuticals to maximize patient's function improve quality of life. Education: 
Patient again educated on the importance of strict compliance with the opioid care agreement while on opioid therapy. Patient also again educated that they should avoid driving while on chronic opioid therapy. Also advised to avoid alcohol and to avoid benzodiazepines while on opioid therapy. Handouts given regarding opioid safety and sleep health. Social History Socioeconomic History  Marital status:  Spouse name: Not on file  Number of children: Not on file  Years of education: Not on file  Highest education level: Not on file Occupational History  Not on file Social Needs  Financial resource strain: Not on file  Food insecurity:  
  Worry: Not on file Inability: Not on file  Transportation needs:  
  Medical: Not on file Non-medical: Not on file Tobacco Use  Smoking status: Never Smoker  Smokeless tobacco: Never Used Substance and Sexual Activity  Alcohol use: No  
 Drug use: No  
 Sexual activity: Never Lifestyle  Physical activity:  
  Days per week: Not on file Minutes per session: Not on file  Stress: Not on file Relationships  Social connections:  
  Talks on phone: Not on file Gets together: Not on file Attends Anabaptism service: Not on file Active member of club or organization: Not on file Attends meetings of clubs or organizations: Not on file Relationship status: Not on file  Intimate partner violence:  
  Fear of current or ex partner: Not on file Emotionally abused: Not on file Physically abused: Not on file Forced sexual activity: Not on file Other Topics Concern  Not on file Social History Narrative  Not on file Family History Problem Relation Age of Onset  Hypertension Mother  Hypertension Father  Stroke Father Allergies Allergen Reactions  Aspirin Nausea Only and Unknown (comments) Other reaction(s): gi distress Past Medical History:  
Diagnosis Date  Abdominal pain, other specified site 10/11  
 with abnml LFT, no etiology found  Abnormal LFTs  Anemia  Bile duct stone 11/13 Dr. Jacobo Estimable  Bunion of great toe of right foot 3/1/2013  Cholangitis 4/14/16  Chondromalacia of right shoulder 11/6/2014  Colon polyps  CRI (chronic renal insufficiency)  CVA (cerebral infarction) LACUNAR  
 DDD (degenerative disc disease), lumbar 11/6/2014  D[facility]  Diabetes ([facility]  DJD (degenerative joint disease) of lumbar spine 1994  DJD (degenerative joint disease), multiple sites 11/6/2014  Gallstone pancreatitis   
 stent placed, June 2012  GERD (gastroesophageal reflux disease)  History of bone density study 1/07  
 wnl  Hypercholesterolemia  Hypertension  Lumbar arthropathy 11/6/2014  Lumbar nerve root impingement 11/6/2014  Lumbar post-laminectomy syndrome 11/6/2014  Pancolonic diverticulosis  Polyarthralgia 11/6/2014  Post laminectomy syndrome 2002  S/P lumbar spinal fusion 11/6/2014  Sacroiliitis ([facility] [address] 11/6/2014  Spondylolisthesis of lumbar region 11/6/2014  Subscapularis tendinitis of right shoulder 11/6/2014 Past Surgical History:  
Procedure Laterality Date  COLONOSCOPY  5/29/12 Dr. Pia Favre, single polyp, 5 yr f/u  
 HX BUNIONECTOMY    
 LEFT  
 [address] RELEASE  2000  HX CHOLECYSTECTOMY  12/09 [facility]  HX TONSIL AND ADENOIDECTOMY Current Outpatient Medications on File Prior to Visit Medication Sig  
 metoprolol tartrate (LOPRESSOR) 25 mg tablet TAKE 1 TABLET BY MOUTH TWICE DAILY  rivastigmine (EXELON) 9.5 mg/24 hr patch APPLY 1 PATCH EXTERNALLY TO THE SKIN DAILY  potassium chloride (K-DUR, KLOR-CON) 20 mEq tablet TAKE 1 TABLET BY MOUTH TWICE DAILY  valsartan-hydroCHLOROthiazide (DIOVAN-HCT) 320-25 mg per tablet TAKE 1 TABLET BY MOUTH DAILY  furosemide (LASIX) 40 mg tablet TAKE 1 TABLET BY MOUTH DAILY (Patient taking differently: daily as needed.)  valsartan-hydroCHLOROthiazide (DIOVAN-HCT) 320-25 mg per tablet take one tablet by mouth daily  lidocaine (XYLOCAINE) 5 % ointment USE DAILY AS NEEDED FOR DRESSING CHANGES  
 amLODIPine (NORVASC) 10 mg tablet TAKE 1 TABLET BY MOUTH EVERY DAY  ergocalciferol (ERGOCALCIFEROL) 50,000 unit capsule TAKE 1 CAPSULE BY MOUTH EVERY 7 DAYS  omeprazole (PRILOSEC) 40 mg capsule TAKE 1 CAPSULE BY MOUTH DAILY  tolterodine ER (DETROL LA) 4 mg ER capsule TAKE 1 CAPSULE BY MOUTH DAILY  ONETOUCH ULTRA TEST strip TEST BLOOD SUGAR DAILY OR AS DIRECTED  
 ursodiol (ACTIGALL) 300 mg capsule TAKE 1 CAPSULE BY MOUTH TWICE DAILY  cromolyn (OPTICROM) 4 % ophthalmic solution Administer 1 Drop to both eyes as needed.  Blood-Glucose Meter (ONE TOUCH ULTRA 2) monitoring kit Use to test blood sugar daily or as directed  Lancets (ONE TOUCH ULTRASOFT LANCETS) Misc Use to test blood sugar daily or as directed  [DISCONTINUED] morphine IR (MS IR) 15 mg tablet Take 1 Tab by mouth every four to six (4-6) hours as needed for Pain for up to 30 days. Max Daily Amount: 5 Tabs.  naloxone (NARCAN) 4 mg/actuation nasal spray Use 1 spray intranasally into 1 nostril as needed for opioid respiratory emergency only. Indications: OPIATE-INDUCED RESPIRATORY DEPRESSION No current facility-administered medications on file prior to visit. [address] was used for portions of this report. Unintended errors may occur.

## 2019-11-19 NOTE — PATIENT INSTRUCTIONS

## 2020-01-01 ENCOUNTER — CLINICAL SUPPORT (OUTPATIENT)
Dept: INTERNAL MEDICINE CLINIC | Age: 85
End: 2020-01-01
Payer: MEDICARE

## 2020-01-01 DIAGNOSIS — Z76.0 MEDICATION REFILL: ICD-10-CM

## 2020-01-01 DIAGNOSIS — I48.91 ATRIAL FIBRILLATION, UNSPECIFIED TYPE (HCC): ICD-10-CM

## 2020-01-01 DIAGNOSIS — I10 ESSENTIAL HYPERTENSION: Chronic | ICD-10-CM

## 2020-01-01 DIAGNOSIS — F02.80 LATE ONSET ALZHEIMER'S DISEASE WITHOUT BEHAVIORAL DISTURBANCE (HCC): Chronic | ICD-10-CM

## 2020-01-01 DIAGNOSIS — G30.1 LATE ONSET ALZHEIMER'S DISEASE WITHOUT BEHAVIORAL DISTURBANCE (HCC): Chronic | ICD-10-CM

## 2020-01-01 DIAGNOSIS — Z23 ENCOUNTER FOR IMMUNIZATION: ICD-10-CM

## 2020-01-01 LAB
CREATININE, EXTERNAL: 1
INR, EXTERNAL: 0.95
PT, EXTERNAL: 10.1
SARS-COV-2, NAA: NOT DETECTED

## 2020-01-01 PROCEDURE — 90694 VACC AIIV4 NO PRSRV 0.5ML IM: CPT | Performed by: INTERNAL MEDICINE

## 2020-01-01 PROCEDURE — G0008 ADMIN INFLUENZA VIRUS VAC: HCPCS | Performed by: INTERNAL MEDICINE

## 2020-01-01 RX ORDER — VALSARTAN AND HYDROCHLOROTHIAZIDE 320; 25 MG/1; MG/1
TABLET, FILM COATED ORAL
Qty: 30 TAB | Refills: 11 | Status: SHIPPED | OUTPATIENT
Start: 2020-01-01

## 2020-01-01 RX ORDER — LIDOCAINE 50 MG/G
OINTMENT TOPICAL
Qty: 50 G | Refills: 5 | Status: SHIPPED | OUTPATIENT
Start: 2020-01-01

## 2020-01-01 RX ORDER — RIVASTIGMINE 9.5 MG/24H
PATCH, EXTENDED RELEASE TRANSDERMAL
Qty: 30 PATCH | Refills: 5 | Status: SHIPPED | OUTPATIENT
Start: 2020-01-01 | End: 2021-01-01

## 2020-01-01 RX ORDER — POTASSIUM CHLORIDE 20 MEQ/1
TABLET, EXTENDED RELEASE ORAL
Qty: 60 TAB | Refills: 11 | Status: SHIPPED | OUTPATIENT
Start: 2020-01-01

## 2020-01-01 RX ORDER — METOPROLOL TARTRATE 25 MG/1
TABLET, FILM COATED ORAL
Qty: 60 TAB | Refills: 5 | Status: SHIPPED | OUTPATIENT
Start: 2020-01-01 | End: 2021-01-01

## 2020-01-10 ENCOUNTER — PATIENT OUTREACH (OUTPATIENT)
Dept: INTERNAL MEDICINE CLINIC | Age: 85
End: 2020-01-10

## 2020-01-10 NOTE — PROGRESS NOTES
Hospital Discharge Follow-Up      Date/Time:  1/10/2020 12:41 PM    Patient was admitted to [facility] on 1/2/20 and discharged on 01/08/20 for AMS. The physician discharge summary was available at the time of outreach. Patient discharged to [facility] on 1/8/20. [facility]  to follow up on admission to SNF. Spoke with Nurse Malcolm Glez. Introduced self and reason for call. Provided 2 patient identifiers. Patient remains a current admission. Anticipated discharge date is unknown. [facility] reviewed with Nurse Malcolm Glez on phone. Nurse Malcolm Glez states that Patient is doing well. Patient is there for wound care, PT OT. Labs drawn. CCT Team Ms. Chong made aware of Pt. Admission to [facility].        [facility] follow up appointment(s):   Future Appointments   Date Time Provider Karen Brown   2/18/2020  9:30 AM Maryan Chavarria MD [address] [facility]

## 2020-01-17 ENCOUNTER — PATIENT OUTREACH (OUTPATIENT)
Dept: CASE MANAGEMENT | Age: 85
End: 2020-01-17

## 2020-01-17 NOTE — PROGRESS NOTES
Community Care Team Documentation for Patient in [facility]     [facility] [address] [facility], on 1/8/20. Hospital Discharge diagnosis:       Altered Mental Status    SNF Attending Provider:      Anticipated discharge date from SNF:  GIOVANNI    PCP : Rita Claros MD    CTN: Tan Garduno RN    [facility] Team rounds completed, updates provided by facility. Brief Summary of Care:    Patient receiving PT/OT. - anticipate needing another 1-2 weeks of therapy. Dispo:  Patient was living with son and daughter PTA. Goal is to return home with them. Will use [facility]. RRAT:  High Risk            45       Total Score        45 Charlson Comorbidity Score (Age + Comorbid Conditions)        Criteria that do not apply:    Has Seen PCP in Last 6 Months (Yes=3, No=0)    . Living with Significant Other. Assisted Living. LTAC. SNF. or   Rehab    Patient Length of Stay (>5 days = 3)    IP Visits Last 12 Months (1-3=4, 4=9, >4=11)    Pt.  Coverage (Medicare=5 , Medicaid, or Self-Pay=4)        Active Ambulatory Problems     Diagnosis Date Noted    Lumbago     Postlaminectomy syndrome, lumbar region     Degeneration of lumbar or lumbosacral intervertebral disc     Lumbosacral spondylosis without myelopathy     Thoracic or lumbosacral neuritis or radiculitis, unspecified     Spasm of muscle     Pain in limb     Disturbance of skin sensation     Diabetes mellitus ([address] 02/23/2012    HTN (hypertension) 02/23/2012    Hypercholesteremia 02/23/2012    GERD (gastroesophageal reflux disease) 02/23/2012    CRI (chronic renal insufficiency) 02/23/2012    Dementia ([address] 02/23/2012    Gallstones 07/31/2012    Bunion of great toe of right foot 03/01/2013    DDD (degenerative disc disease), lumbar 11/06/2014    Lumbar arthropathy 11/06/2014    Spondylolisthesis of lumbar region 11/06/2014    Lumbar post-laminectomy syndrome 11/06/2014    S/P lumbar spinal fusion 11/06/2014    Lumbar nerve root impingement 11/06/2014    Sacroiliitis ([facility] [address] 11/06/2014    Polyarthralgia 11/06/2014    DJD (degenerative joint disease), multiple sites 11/06/2014    Subscapularis tendinitis of right shoulder 11/06/2014    Chondromalacia of right shoulder 11/06/2014    Shoulder pain 03/26/2015    Chronic low back pain 09/21/2016    Type 2 diabetes with nephropathy ([facility] [address] 04/22/2019    Hypertension     Hypercholesterolemia     Diabetes ([facility] [address]      Resolved Ambulatory Problems     Diagnosis Date Noted    Encounter for long-term (current) use of other medications      Past Medical History:   Diagnosis Date    Abdominal pain, other specified site 10/11    Abnormal LFTs     Anemia     Bile duct stone 11/13    Cholangitis 4/14/16    Colon polyps     CVA (cerebral infarction)     DJD (degenerative joint disease) of lumbar spine 1994    Gallstone pancreatitis     History of bone density study 1/07    Pancolonic diverticulosis     Post laminectomy syndrome 2002

## 2020-01-23 ENCOUNTER — PATIENT OUTREACH (OUTPATIENT)
Dept: CASE MANAGEMENT | Age: 85
End: 2020-01-23

## 2020-01-23 NOTE — PROGRESS NOTES
Community Care Team Documentation for Patient in [facility] Patient dischargedfrom THE [facility] to [facility] on 1/8/20. Hospital Discharge diagnosis: ? Altered Mental Status SNF Attending Provider: Anticipated discharge date from SNF:  1/25/20 PCP : Jerrell Cash MD 
 
CTN: Makayla Call RN Community Care Team rounds completed, updates provided by facility. Brief Summary of Care: 
 
Patient receiving PT/OT. Doing very well. Dispo:  Patient was living with son and daughter PTA. She will be returning home with them on 1/25/20 and will use [facility]. RRAT:  High Risk 39 Total Score 45 Charlson Comorbidity Score (Age + Comorbid Conditions) Criteria that do not apply:  
 Has Seen PCP in Last 6 Months (Yes=3, No=0) . Living with Significant Other. Assisted Living. LTAC. SNF. or  
Rehab Patient Length of Stay (>5 days = 3) IP Visits Last 12 Months (1-3=4, 4=9, >4=11) Pt. Coverage (Medicare=5 , Medicaid, or Self-Pay=4) Active Ambulatory Problems Diagnosis Date Noted  Lumbago  Postlaminectomy syndrome, lumbar region  Degeneration of lumbar or lumbosacral intervertebral disc  Lumbosacral spondylosis without myelopathy  Thoracic or lumbosacral neuritis or radiculitis, unspecified  Spasm of muscle  Pain in limb  Disturbance of skin sensation  Diabetes mellitus ([address] 02/23/2012  
 HTN (hypertension) 02/23/2012  Hypercholesteremia 02/23/2012  GERD (gastroesophageal reflux disease) 02/23/2012  CRI (chronic renal insufficiency) 02/23/2012  Dementia ([facility] 02/23/2012  Gallstones 07/31/2012  Bunion of great toe of right foot 03/01/2013  DDD (degenerative disc disease), lumbar 11/06/2014  Lumbar arthropathy 11/06/2014  Spondylolisthesis of lumbar region 11/06/2014  Lumbar post-laminectomy syndrome 11/06/2014  S/P lumbar spinal fusion 11/06/2014  Lumbar nerve root impingement 11/06/2014  Sacroiliitis ([facility] 11/06/2014  Polyarthralgia 11/06/2014  DJD (degenerative joint disease), multiple sites 11/06/2014  Subscapularis tendinitis of right shoulder 11/06/2014  Chondromalacia of right shoulder 11/06/2014  Shoulder pain 03/26/2015  Chronic low back pain 09/21/2016  Type 2 diabetes with nephropathy ([facility] 04/22/2019  Hypertension  Hypercholesterolemia  Diabetes ([facility] Resolved Ambulatory Problems Diagnosis Date Noted  Encounter for long-term (current) use of other medications Past Medical History:  
Diagnosis Date  Abdominal pain, other specified site 10/11  Abnormal LFTs  Anemia  Bile duct stone 11/13  Cholangitis 4/14/16  Colon polyps  CVA (cerebral infarction)  DJD (degenerative joint disease) of lumbar spine 1994  Gallstone pancreatitis  History of bone density study 1/07  Pancolonic diverticulosis  Post laminectomy syndrome 2002

## 2020-01-27 ENCOUNTER — PATIENT OUTREACH (OUTPATIENT)
Dept: INTERNAL MEDICINE CLINIC | Age: 85
End: 2020-01-27

## 2020-01-27 NOTE — PROGRESS NOTES
Hospital Discharge Follow-Up      Date/Time:  2020 3:39 PM    Patient was admitted to THE [facility] on 20 and discharged on 20 for AMS. The physician discharge summary was available at the time of outreach. Patient discharged to [facility] on 20. Top Challenges reviewed with the provider   None noted at this time. Advance Care Planning:   Does patient have an Advance Directive:  not on file        Method of communication with provider : none    Was this a readmission? no   Patient stated reason for the readmission: n/a    Care Transition Nurse (CTN) contacted the Patient's son Mr. Colin Jim by telephone to perform post hospital discharge assessment. Verified name and  with patient's son as identifiers. Provided introduction to self, and explanation of the CTN role. Spoke with Patient's son Mr. Colin Jim on phone. PHI verified and Mr. Emili Vegas is listed on Pt. PHI. Patient's son states that Patient is doing well. Patient's son reported to CTN that patient is moving well. Patient's son states that his sister is the one who handles Patient medications. Unable to review medrec at this time. Pt. Son states that he will bring in the list of medications at Pt. Appt. Patient lives with his son and daughter. Patient family wants to discuss possible of getting  Hearing aide at her appt visit. Patient's son states that they will discuss it with Dr. Neville Salazar.   Patient family states that they will follow up with [facility] tomorrow if they dont received a call. Patient's son  reminded that there is a physician on call 24 hours a day / 7 days a week (M-F 5pm to 8am and from Friday 5pm until Monday 8a for the weekend) should the patient have questions or concerns.   Patient's son  reminded to call 911 if situation is emergent ( such as chest pain, shortness of breath, unstoppable bleeding, feeling of passing out,  worsening of symptoms)or patient feels the situation is emergent. Pt's son  verbalizes understanding. Patient's son states that they received hospital discharge instructions. CTN reviewed discharge instructions and red flags with patient's son who verbalized understanding. Patient's son given an opportunity to ask questions and does not have any further questions or concerns at this time. The patient's son agrees to contact the PCP office for questions related to their healthcare. CTN provided contact information for future reference.     [facility] follow up appointment(s):   Future Appointments   Date Time Provider Karen Stephanie   2/3/2020  3:00 PM Judit Boyd MD [address] [place] [address]   2/18/2020  9:30 AM Judit Boyd MD [address] [place] [address]

## 2020-02-03 ENCOUNTER — OFFICE VISIT (OUTPATIENT)
Dept: INTERNAL MEDICINE CLINIC | Age: 85
End: 2020-02-03

## 2020-02-03 VITALS
SYSTOLIC BLOOD PRESSURE: 151 MMHG | BODY MASS INDEX: 25.16 KG/M2 | HEIGHT: 63 IN | WEIGHT: 142 LBS | OXYGEN SATURATION: 100 % | DIASTOLIC BLOOD PRESSURE: 68 MMHG | HEART RATE: 71 BPM | RESPIRATION RATE: 16 BRPM | TEMPERATURE: 98.1 F

## 2020-02-03 DIAGNOSIS — I10 ESSENTIAL HYPERTENSION: ICD-10-CM

## 2020-02-03 DIAGNOSIS — G93.41 METABOLIC ENCEPHALOPATHY: ICD-10-CM

## 2020-02-03 DIAGNOSIS — Z09 HOSPITAL DISCHARGE FOLLOW-UP: Primary | ICD-10-CM

## 2020-02-03 DIAGNOSIS — F02.80 LATE ONSET ALZHEIMER'S DISEASE WITHOUT BEHAVIORAL DISTURBANCE (HCC): ICD-10-CM

## 2020-02-03 DIAGNOSIS — G30.1 LATE ONSET ALZHEIMER'S DISEASE WITHOUT BEHAVIORAL DISTURBANCE (HCC): ICD-10-CM

## 2020-02-03 DIAGNOSIS — E78.00 HYPERCHOLESTEREMIA: ICD-10-CM

## 2020-02-03 RX ORDER — OXYCODONE HYDROCHLORIDE 5 MG/1
TABLET ORAL
COMMUNITY
Start: 2020-01-25

## 2020-02-03 NOTE — PROGRESS NOTES
HISTORY OF PRESENT ILLNESS Crow Whitaker is a 80 y.o. female. Visit Vitals /68 (BP 1 Location: Right arm, BP Patient Position: Sitting) Pulse 71 Temp 98.1 °F (36.7 °C) (Oral) Resp 16 Ht 5' 3\" (1.6 m) Wt 142 lb (64.4 kg) SpO2 100% BMI 25.15 kg/m² Here with son as usual. 
 
Pt was in the hospital for 3 days for altered mental status then transferred to [facility] for 2 weeks. She was discharged from [facility] on 1/25/20 to home She is doing much better. Walking better with her walker. Eating better Review of Systems Constitutional: Negative. Dementia--so she really can not relate ROS well. Physical Exam 
Vitals signs and nursing note reviewed. Constitutional:   
   Appearance: She is well-developed. Cardiovascular:  
   Rate and Rhythm: Normal rate and regular rhythm. Pulmonary:  
   Effort: Pulmonary effort is normal.  
   Breath sounds: Normal breath sounds. Skin: 
   General: Skin is warm and dry. Neurological:  
   General: No focal deficit present. Mental Status: She is alert. Psychiatric:     
   Mood and Affect: Mood normal.  
 
 
 
ASSESSMENT and PLAN 
  ICD-10-CM ICD-9-CM 1. Hospital discharge follow-up Z09 V67.59   
2. Essential hypertension I10 401.9 3. Hypercholesteremia E78.00 272.0 4. Late onset Alzheimer's disease without behavioral disturbance (HCC) G30.1 331.0 F02.80 294.10   
5. Metabolic encephalopathy T28.44 348.31 Available hospital/ER record reviewed She looks well today She really enjoyed PT at [facility]. [facility] came out today and will be seeing her. PT/OT will be coming out Can cancel appt later this month Given info regarding audiology evaluations. Pt would like to discuss hearing aides F/u April for [address]

## 2020-02-03 NOTE — PROGRESS NOTES
ROOM # 4 Chai Cesar presents today for Chief Complaint Patient presents with  
[address] preferred language for health care discussion is english/other. Is someone accompanying this pt? son Is the patient using any DME equipment during OV? walker Depression Screening: 
3 most recent [facility] Screens 11/19/2019 8/27/2019 5/7/2019 3/12/2019 1/21/2019 12/17/2018 10/2/2018 Little interest or pleasure in doing things Not at all Not at all Not at all Not at all Not at all Not at all Not at all Feeling down, depressed, irritable, or hopeless Not at all Not at all Not at all Not at all Not at all Not at all Not at all Total Score PHQ 2 0 0 0 0 0 0 0 Learning Assessment: 
Learning Assessment 1/21/2019 8/8/2018 1/25/2018 5/26/2015 12/26/2013 PRIMARY LEARNER Child(mary) Patient Patient Patient Patient HIGHEST LEVEL OF EDUCATION - PRIMARY LEARNER  4 YEARS OF COLLEGE GRADUATED HIGH SCHOOL OR GED GRADUATED HIGH SCHOOL OR GED GRADUATED HIGH SCHOOL OR GED -  
BARRIERS PRIMARY LEARNER COGNITIVE HEARING HEARING NONE -  
CO-LEARNER CAREGIVER Yes Yes Yes Yes -  
CO-LEARNER NAME UGHYTN [id number] BJBETI           EFAFSJ IQRGTL AIEOYV KWAWPL -  
CO-LEARNER HIGHEST LEVEL OF EDUCATION - > 4 YEARS OF COLLEGE > 4 YEARS OF COLLEGE - -  
BARRIERS CO-LEARNER - NONE NONE - - PRIMARY LANGUAGE ENGLISH ENGLISH ENGLISH ENGLISH ENGLISH  
PRIMARY LANGUAGE CO-LEARNER - ENGLISH ENGLISH - - LEARNER PREFERENCE PRIMARY DEMONSTRATION LISTENING LISTENING READING LISTENING  
LEARNER PREFERENCE CO-LEARNER - DEMONSTRATION - - -  
ANSWERED BY son self self patient patient RELATIONSHIP OTHER SELF SELF SELF SELF Abuse Screening: 
Abuse Screening Questionnaire 4/22/2019 1/21/2019 8/8/2018 1/25/2018 11/30/2017 Do you ever feel afraid of your partner? Y N N N N Are you in a relationship with someone who physically or mentally threatens you?  Y N N N N  
 Is it safe for you to go home? Leydi Last Fall Risk Fall Risk Assessment, last 12 mths 11/19/2019 8/27/2019 5/7/2019 4/22/2019 1/21/2019 12/17/2018 8/16/2018 Able to walk? Yes Yes Yes Yes Yes Yes Yes Fall in past 12 months? Yes No Yes Yes Yes Yes Yes Fall with injury? No - No No No No No  
Number of falls in past 12 months 1 - 1 1 1 1 1 Fall Risk Score 1 - 1 1 1 1 1 Health Maintenance reviewed and discussed per provider. Yes 
 
Cynthia Akers is due for Health Maintenance Due Topic Date Due  
 HEMOGLOBIN A1C Q6M  06/19/2019  LIPID PANEL Q1  12/19/2019 Please order/place referral if appropriate. Advance Directive: 1. Do you have an advance directive in place? Patient Reply: no 
 
2. If not, would you like material regarding how to put one in place? Patient Reply: no 
 
Coordination of Care: 1. Have you been to the ER, urgent care clinic since your last visit? Hospitalized since your last visit? yes 2. Have you seen or consulted any other health care providers outside of the [address] [place] [address] since your last visit? Include any pap smears or colon screening.  no

## 2020-02-07 ENCOUNTER — PATIENT OUTREACH (OUTPATIENT)
Dept: INTERNAL MEDICINE CLINIC | Age: 85
End: 2020-02-07

## 2020-02-07 NOTE — PROGRESS NOTES
Hospital Discharge Follow-Up      Date/Time:  2/7/2020 2:18 PM    Patient was admitted to THE [facility] on 1/2/20 and discharged on 01/08/20 for AMS. The physician discharge summary was available at the time of outreach. Patient discharged to [facility] on 1/8/20. CTN Attempt to contact patient via telephone on 2/7/20 for follow up. Unable to reach. Left message on voicemail with office contact information. No Patient medical information left on message. Noted Pt. Attended appt with PCP 2/3/20.      [facility] follow up appointment(s):   Future Appointments   Date Time Provider Karen Brown   2/18/2020  9:30 AM Edson Hamilton, Mora Blizzard, MD [address] [place] [address]   4/9/2020 10:15 AM Elma Navarro MD [address] [place] [address]

## 2020-03-16 ENCOUNTER — PATIENT OUTREACH (OUTPATIENT)
Dept: CASE MANAGEMENT | Age: 85
End: 2020-03-16

## 2020-03-16 NOTE — PROGRESS NOTES
Hospital Discharge Follow-Up      Date/Time:  3/16/2020 3:18 PM    Patient was admitted to THE [facility] on 1/2/20 and discharged on 01/08/20 for AMS. Attempt to contact patient via telephone on 3/16/20 for follow up, Unable to reach. Left message on voicemail with office contact information. No Patient medical information left on message. Patient has graduated from the Transitions of Care Coordination  program on 3/16/20. No further nurse navigator follow up scheduled. Noted no hospitalization admission post 30 days from discharge date 1/8/20  This episode is closed. Post Hospitalization Encounter Resolved. No Care Transition Nurse follow up scheduled.

## 2020-04-13 DIAGNOSIS — Z76.0 MEDICATION REFILL: ICD-10-CM

## 2020-04-13 RX ORDER — TOLTERODINE 4 MG/1
CAPSULE, EXTENDED RELEASE ORAL
Qty: 30 CAP | Refills: 11 | Status: SHIPPED | OUTPATIENT
Start: 2020-04-13 | End: 2021-01-01

## 2020-04-13 RX ORDER — OMEPRAZOLE 40 MG/1
CAPSULE, DELAYED RELEASE ORAL
Qty: 30 CAP | Refills: 11 | Status: SHIPPED | OUTPATIENT
Start: 2020-04-13 | End: 2021-01-01

## 2020-04-13 RX ORDER — ERGOCALCIFEROL 1.25 MG/1
CAPSULE ORAL
Qty: 4 CAP | Refills: 11 | Status: SHIPPED | OUTPATIENT
Start: 2020-04-13 | End: 2021-01-01

## 2020-04-13 RX ORDER — AMLODIPINE BESYLATE 10 MG/1
TABLET ORAL
Qty: 30 TAB | Refills: 11 | Status: SHIPPED | OUTPATIENT
Start: 2020-04-13 | End: 2021-01-01

## 2020-05-21 DIAGNOSIS — Z76.0 MEDICATION REFILL: ICD-10-CM

## 2020-05-21 DIAGNOSIS — F02.80 LATE ONSET ALZHEIMER'S DISEASE WITHOUT BEHAVIORAL DISTURBANCE (HCC): Chronic | ICD-10-CM

## 2020-05-21 DIAGNOSIS — G30.1 LATE ONSET ALZHEIMER'S DISEASE WITHOUT BEHAVIORAL DISTURBANCE (HCC): Chronic | ICD-10-CM

## 2020-05-21 RX ORDER — RIVASTIGMINE 9.5 MG/24H
PATCH, EXTENDED RELEASE TRANSDERMAL
Qty: 30 PATCH | Refills: 5 | Status: SHIPPED | OUTPATIENT
Start: 2020-05-21 | End: 2020-01-01

## 2020-05-29 DIAGNOSIS — I48.91 ATRIAL FIBRILLATION, UNSPECIFIED TYPE (HCC): ICD-10-CM

## 2020-05-29 DIAGNOSIS — I10 ESSENTIAL HYPERTENSION: Chronic | ICD-10-CM

## 2020-05-29 DIAGNOSIS — Z76.0 MEDICATION REFILL: ICD-10-CM

## 2020-05-29 RX ORDER — METOPROLOL TARTRATE 25 MG/1
TABLET, FILM COATED ORAL
Qty: 60 TAB | Refills: 5 | Status: SHIPPED | OUTPATIENT
Start: 2020-05-29 | End: 2020-01-01

## 2020-06-04 ENCOUNTER — OFFICE VISIT (OUTPATIENT)
Dept: INTERNAL MEDICINE CLINIC | Age: 85
End: 2020-06-04

## 2020-06-04 ENCOUNTER — HOSPITAL ENCOUNTER (OUTPATIENT)
Dept: LAB | Age: 85
Discharge: HOME | End: 2020-06-04
Payer: MEDICARE

## 2020-06-04 VITALS
TEMPERATURE: 98 F | BODY MASS INDEX: 26.97 KG/M2 | RESPIRATION RATE: 18 BRPM | HEIGHT: 63 IN | WEIGHT: 152.2 LBS | HEART RATE: 67 BPM | SYSTOLIC BLOOD PRESSURE: 128 MMHG | OXYGEN SATURATION: 99 % | DIASTOLIC BLOOD PRESSURE: 74 MMHG

## 2020-06-04 DIAGNOSIS — E11.21 TYPE 2 DIABETES WITH NEPHROPATHY (HCC): ICD-10-CM

## 2020-06-04 DIAGNOSIS — I10 ESSENTIAL HYPERTENSION: ICD-10-CM

## 2020-06-04 DIAGNOSIS — Z00.00 MEDICARE ANNUAL WELLNESS VISIT, SUBSEQUENT: Primary | ICD-10-CM

## 2020-06-04 DIAGNOSIS — E78.00 HYPERCHOLESTEREMIA: ICD-10-CM

## 2020-06-04 DIAGNOSIS — F02.80 LATE ONSET ALZHEIMER'S DISEASE WITHOUT BEHAVIORAL DISTURBANCE (HCC): ICD-10-CM

## 2020-06-04 DIAGNOSIS — G30.1 LATE ONSET ALZHEIMER'S DISEASE WITHOUT BEHAVIORAL DISTURBANCE (HCC): ICD-10-CM

## 2020-06-04 LAB
ANION GAP SERPL CALC-SCNC: 8 MMOL/L (ref 3–18)
BUN SERPL-MCNC: 25 MG/DL (ref 7–18)
BUN/CREAT SERPL: 22 (ref 12–20)
CALCIUM SERPL-MCNC: 9.8 MG/DL (ref 8.5–10.1)
CHLORIDE SERPL-SCNC: 112 MMOL/L (ref 100–111)
CO2 SERPL-SCNC: 24 MMOL/L (ref 21–32)
CREAT SERPL-MCNC: 1.13 MG/DL (ref 0.6–1.3)
GLUCOSE SERPL-MCNC: 69 MG/DL (ref 74–99)
HBA1C MFR BLD: 6.2 % (ref 4.2–5.6)
POTASSIUM SERPL-SCNC: 4.5 MMOL/L (ref 3.5–5.5)
SODIUM SERPL-SCNC: 144 MMOL/L (ref 136–145)

## 2020-06-04 PROCEDURE — 80048 BASIC METABOLIC PNL TOTAL CA: CPT

## 2020-06-04 PROCEDURE — 36415 COLL VENOUS BLD VENIPUNCTURE: CPT

## 2020-06-04 PROCEDURE — 83036 HEMOGLOBIN GLYCOSYLATED A1C: CPT

## 2020-06-04 NOTE — PROGRESS NOTES
Sánchez Sage is a 80 y.o. female (: 1926) presenting to address: Chief Complaint Patient presents with [address] [facility] Annual Wellness Visit Vitals:  
 20 1342 BP: 128/74 Pulse: 67 Resp: 18 Temp: 98 °F (36.7 °C) TempSrc: Oral  
SpO2: 99% Weight: 152 lb 3.2 oz (69 kg) Height: 5' 3\" (1.6 m) PainSc:   8 PainLoc: Back Hearing/Vision: No exam data present Learning Assessment:  
 
Learning Assessment 2019 PRIMARY LEARNER Child(mary) HIGHEST LEVEL OF EDUCATION - PRIMARY LEARNER  4 YEARS OF COLLEGE  
BARRIERS PRIMARY LEARNER COGNITIVE  
CO-LEARNER CAREGIVER Yes [facility] [address] [facility] -  
PRIMARY LANGUAGE ENGLISH  
PRIMARY LANGUAGE CO-LEARNER -  
LEARNER PREFERENCE PRIMARY DEMONSTRATION  
LEARNER PREFERENCE CO-LEARNER -  
ANSWERED BY son RELATIONSHIP OTHER Depression Screening:  
 
3 most recent PHQ Screens 2020 Little interest or pleasure in doing things Not at all Feeling down, depressed, irritable, or hopeless Not at all Total Score PHQ 2 0 Fall Risk Assessment:  
 
Fall Risk Assessment, last 12 mths 2020 Able to walk? Yes Fall in past 12 months? Yes Fall with injury? No  
Number of falls in past 12 months 3 Fall Risk Score 3 Abuse Screening:  
 
Abuse Screening Questionnaire 2020 Do you ever feel afraid of your partner? Reyes Backer Are you in a relationship with someone who physically or mentally threatens you? Reyes Backer Is it safe for you to go home? [place] Necessary Coordination of Care Questionaire: 1. Have you been to the ER, urgent care clinic since your last visit? Hospitalized since your last visit? NO 
 
2. Have you seen or consulted any other health care providers outside of the [address] [place] [address] since your last visit? Include any pap smears or colon screening. YES Dr. Tonio Webb Advanced Directive: 1. Do you have an Advanced Directive?  NO 
 
 2. Would you like information on Advanced Directives?  NO

## 2020-06-04 NOTE — PROGRESS NOTES
The following is a separate encounter visit: 
 
HISTORY OF PRESENT ILLNESS Michelle Montez is a 80 y.o. female. Visit Vitals /74 (BP 1 Location: Right arm, BP Patient Position: Sitting) Pulse 67 Temp 98 °F (36.7 °C) (Oral) Resp 18 Ht 5' 3\" (1.6 m) Wt 152 lb 3.2 oz (69 kg) SpO2 99% BMI 26.96 kg/m² Had some increased confusion in the winter. She was found to have a blocked biliary tube with back up into her biliary tree. She had a larger tube inserted and they flush more often now. Her son was out of the country at the time. Hypertension The history is provided by the patient. This is a chronic problem. The current episode started more than 1 week ago. The problem has not changed since onset. Associated symptoms include confusion. Pertinent negatives include no chest pain, no palpitations and no shortness of breath. There are no associated agents to hypertension. Risk factors include postmenopause and stress. Dementia The history is provided by the patient. This is a chronic problem. The current episode started more than 1 week ago. The problem has not changed since onset. Associated symptoms include confusion. Pertinent negatives include no agitation, no delusions and no hallucinations. Mental status baseline is moderate dementia. Risk factors include dementia. Her past medical history is significant for hypertension. Review of Systems Reason unable to perform ROS: Pt is unreliable historian due to dementia. Constitutional: Negative for chills and fever. HENT: Negative. C/o chapped lips Respiratory: Negative for shortness of breath. Cardiovascular: Negative for chest pain and palpitations. Gastrointestinal: Negative for abdominal pain. Biliary drainage tube in place Psychiatric/Behavioral: Positive for confusion and memory loss (known alzheimer's dz). Negative for agitation and hallucinations.   
 
 
Physical Exam 
 Vitals signs and nursing note reviewed. Constitutional:   
   Appearance: She is well-developed. Cardiovascular:  
   Rate and Rhythm: Normal rate and regular rhythm. Pulmonary:  
   Effort: Pulmonary effort is normal.  
   Breath sounds: Normal breath sounds. Skin: 
   General: Skin is warm and dry. Neurological:  
   General: No focal deficit present. Mental Status: She is alert and oriented to person, place, and time. Psychiatric:     
   Attention and Perception: Attention normal.     
   Mood and Affect: Affect is blunt. Speech: Speech is delayed. Behavior: Behavior is cooperative. Cognition and Memory: Cognition is impaired. Memory is impaired. She exhibits impaired recent memory and impaired remote memory. ASSESSMENT and PLAN 
  ICD-10-CM ICD-9-CM 2. Essential hypertension H00 326.8 METABOLIC PANEL, BASIC 3. Hypercholesteremia E78.00 272.0 4. Late onset Alzheimer's disease without behavioral disturbance (HCC) G30.1 331.0 F02.80 294.10   
5. Type 2 diabetes with nephropathy ([facility]) H33.71 999.64 METABOLIC PANEL, BASIC  
  583.81 HEMOGLOBIN A1C W/O EAG  
 
 
BP controlled DM has been good. Update lab 
 
alzheimer's appears stable to me. She is quiet but conversant. Answers questions when prompted. Not oriented to time or apace. But aware this is the doctor's office and I am her doctor.  
 
F/u 6 months for HTN, DM

## 2020-06-04 NOTE — PROGRESS NOTES
This is the Subsequent Medicare Annual Wellness Exam, performed 12 months or more after the Initial AWV or the last Subsequent AWV I have reviewed the patient's medical history in detail and updated the computerized patient record. History Patient Active Problem List  
Diagnosis Code  Lumbago M54.5  Postlaminectomy syndrome, lumbar region M96.1  Degeneration of lumbar or lumbosacral intervertebral disc M51.37  
 Lumbosacral spondylosis without myelopathy M47.817  Thoracic or lumbosacral neuritis or radiculitis, unspecified [id number]  Spasm of muscle M62.838  
 Pain in limb M79.609  Disturbance of skin sensation R20.9  Diabetes mellitus ([address] E11.9  
 HTN (hypertension) I10  
 Hypercholesteremia E78.00  GERD (gastroesophageal reflux disease) K21.9  CRI (chronic renal insufficiency) N18.9  Dementia ([address] F03.90  Gallstones K80.20  Bunion of great toe of right foot M21.611  
 DDD (degenerative disc disease), lumbar M51.36  
 Lumbar arthropathy M47.816  Spondylolisthesis of lumbar region M43.16  
 Lumbar post-laminectomy syndrome M96.1  
 S/P lumbar spinal fusion Z98.1  Lumbar nerve root impingement M54.16  
 Sacroiliitis (HCC) M46.1  Polyarthralgia M25.50  DJD (degenerative joint disease), multiple sites M15.9  
 Subscapularis tendinitis of right shoulder M75.81  Chondromalacia of right shoulder M94.211  Shoulder pain M25.519  Chronic low back pain M54.5, G89.29  Type 2 diabetes with nephropathy (HCC) E11.21  
 Hypertension I10  
 Hypercholesterolemia E78.00  
 Diabetes ([facility] E11.9 Past Medical History:  
Diagnosis Date  Abdominal pain, other specified site 10/11  
 with abnml LFT, no etiology found  Abnormal LFTs  Anemia  Bile duct stone 11/13 Dr. Narayan Gutierrez  Bunion of great toe of right foot 3/1/2013  Cholangitis 4/14/16  Chondromalacia of right shoulder 11/6/2014  Colon polyps  CRI (chronic renal insufficiency)  CVA (cerebral infarction) LACUNAR  
 DDD (degenerative disc disease), lumbar 11/6/2014  Dementia ([address]  Diabetes ([facility]  DJD (degenerative joint disease) of lumbar spine 1994  DJD (degenerative joint disease), multiple sites 11/6/2014  Gallstone pancreatitis   
 stent placed, June 2012  GERD (gastroesophageal reflux disease)  History of bone density study 1/07  
 wnl  Hypercholesterolemia  Hypertension  Lumbar arthropathy 11/6/2014  Lumbar nerve root impingement 11/6/2014  Lumbar post-laminectomy syndrome 11/6/2014  Pancolonic diverticulosis  Polyarthralgia 11/6/2014  Post laminectomy syndrome 2002  S/P lumbar spinal fusion 11/6/2014  Sacroiliitis ([facility] [address] 11/6/2014  Spondylolisthesis of lumbar region 11/6/2014  Subscapularis tendinitis of right shoulder 11/6/2014 Past Surgical History:  
Procedure Laterality Date  COLONOSCOPY  5/29/12 Dr. Jerri Barreto, single polyp, 5 yr f/u  
 HX BUNIONECTOMY    
 LEFT  
 [address] RELEASE  2000  HX CHOLECYSTECTOMY  12/09 [address] [place] [address]  HX TONSIL AND ADENOIDECTOMY Current Outpatient Medications Medication Sig Dispense Refill  metoprolol tartrate (LOPRESSOR) 25 mg tablet TAKE 1 TABLET BY MOUTH TWICE DAILY 60 Tab 5  
 rivastigmine (EXELON) 9.5 mg/24 hr patch APPLY 1 PATCH EXTERNALLY TO THE SKIN DAILY 30 Patch 5  
 omeprazole (PRILOSEC) 40 mg capsule TAKE 1 CAPSULE BY MOUTH DAILY 30 Cap 11  
 ergocalciferol (ERGOCALCIFEROL) 1,250 mcg (50,000 unit) capsule TAKE 1 CAPSULE BY MOUTH EVERY 7 DAYS 4 Cap 11  
 amLODIPine (NORVASC) 10 mg tablet TAKE 1 TABLET BY MOUTH EVERY DAY 30 Tab 11  
 tolterodine ER (DETROL LA) 4 mg ER capsule TAKE 1 CAPSULE BY MOUTH DAILY 30 Cap 11  
 oxyCODONE IR (ROXICODONE) 5 mg immediate release tablet TK 1 T PO Q 6 H PRN    
  potassium chloride (K-DUR, KLOR-CON) 20 mEq tablet TAKE 1 TABLET BY MOUTH TWICE DAILY 60 Tab 11  
 valsartan-hydroCHLOROthiazide (DIOVAN-HCT) 320-25 mg per tablet TAKE 1 TABLET BY MOUTH DAILY 30 Tab 11  
 furosemide (LASIX) 40 mg tablet TAKE 1 TABLET BY MOUTH DAILY (Patient taking differently: daily as needed.) 30 Tab 5  
 valsartan-hydroCHLOROthiazide (DIOVAN-HCT) 320-25 mg per tablet take one tablet by mouth daily  5  
 lidocaine (XYLOCAINE) 5 % ointment USE DAILY AS NEEDED FOR DRESSING CHANGES 50 g 5  
 naloxone (NARCAN) 4 mg/actuation nasal spray Use 1 spray intranasally into 1 nostril as needed for opioid respiratory emergency only. Indications: OPIATE-INDUCED RESPIRATORY DEPRESSION 1 Each 0  
 ONETOUCH ULTRA TEST strip TEST BLOOD SUGAR DAILY OR AS DIRECTED 100 Strip 4  
 ursodiol (ACTIGALL) 300 mg capsule TAKE 1 CAPSULE BY MOUTH TWICE DAILY 60 Cap 5  cromolyn (OPTICROM) 4 % ophthalmic solution Administer 1 Drop to both eyes as needed. 2  
 Blood-Glucose Meter (ONE TOUCH ULTRA 2) monitoring kit Use to test blood sugar daily or as directed 1 Kit 0  
 Lancets (ONE TOUCH ULTRASOFT LANCETS) Misc Use to test blood sugar daily or as directed 1 Package 11 Allergies Allergen Reactions  Aspirin Nausea Only and Unknown (comments) Other reaction(s): gi distress Family History Problem Relation Age of Onset  Hypertension Mother  Hypertension Father  Stroke Father Social History Tobacco Use  Smoking status: Never Smoker  Smokeless tobacco: Never Used Substance Use Topics  Alcohol use: No  
 
 
Depression Risk Factor Screening:  
 
3 most recent PHQ Screens 6/4/2020 Little interest or pleasure in doing things Not at all Feeling down, depressed, irritable, or hopeless Not at all Total Score PHQ 2 0 Alcohol Risk Factor Screening: Do you average 1 drink per night or more than 7 drinks a week:  No 
 
 On any one occasion in the past three months have you have had more than 3 drinks containing alcohol:  No 
 
 
Functional Ability and Level of Safety:  
Hearing: will be having a screening test soon and likely needs hearing aides Activities of Daily Living: The home contains: grab bars and walkers Patient needs help with:  transportation, shopping, preparing meals, laundry, housework, managing medications, managing money, eating, dressing, bathing, hygiene, bathroom needs and walking Ambulation: with difficulty, uses a walker Fall Risk: 
Fall Risk Assessment, last 12 mths 6/4/2020 Able to walk? Yes Fall in past 12 months? Yes Fall with injury? No  
Number of falls in past 12 months 3 Fall Risk Score 3 Abuse Screen: 
Patient is not abused Cognitive Screening Has your family/caregiver stated any concerns about your memory: yes - pt has well known cognitive issues. No significant changes Cognitive Screening:     
 
Patient Care Team  
Patient Care Team: 
Ana Soliz MD as PCP - General (Internal Medicine) Ana Soliz MD as PCP - [facility] Empaneled Provider Yanira Alfaro MD (Physical Medicine and Rehab) Blair Sweet MD as Consulting Provider (Ophthalmology) Assessment/Plan Education and counseling provided: 
Are appropriate based on today's review and evaluation Diagnoses and all orders for this visit: 
 
1. Medicare annual wellness visit, subsequent Health Maintenance Due Topic Date Due  Medicare Yearly Exam  04/22/2020

## 2020-06-04 NOTE — PATIENT INSTRUCTIONS
Medicare Wellness Visit, Female The best way to live healthy is to have a lifestyle where you eat a well-balanced diet, exercise regularly, limit alcohol use, and quit all forms of tobacco/nicotine, if applicable. Regular preventive services are another way to keep healthy. Preventive services (vaccines, screening tests, monitoring & exams) can help personalize your care plan, which helps you manage your own care. Screening tests can find health problems at the earliest stages, when they are easiest to treat. [facility] follows the current, evidence-based guidelines published by the [facility] ([facility]) when recommending preventive services for our patients. Because we follow these guidelines, sometimes recommendations change over time as research supports it. (For example, mammograms used to be recommended annually. Even though Medicare will still pay for an annual mammogram, the newer guidelines recommend a mammogram every two years for women of average risk). Of course, you and your doctor may decide to screen more often for some diseases, based on your risk and your co-morbidities (chronic disease you are already diagnosed with). Preventive services for you include: - Medicare offers their members a free annual wellness visit, which is time for you and your primary care provider to discuss and plan for your preventive service needs. Take advantage of this benefit every year! 
-All adults over the age of 72 should receive the recommended pneumonia vaccines. Current USPSTF guidelines recommend a series of two vaccines for the best pneumonia protection.  
-All adults should have a flu vaccine yearly and a tetanus vaccine every 10 years.  
-All adults age 48 and older should receive the shingles vaccines (series of two vaccines). -All adults age 38-68 who are overweight should have a diabetes screening test once every three years. -All adults born between [birth year] and 1965 should be screened once for Hepatitis C. 
-Other screening tests and preventive services for persons with diabetes include: an eye exam to screen for diabetic retinopathy, a kidney function test, a foot exam, and stricter control over your cholesterol.  
-Cardiovascular screening for adults with routine risk involves an electrocardiogram (ECG) at intervals determined by your doctor.  
-Colorectal cancer screenings should be done for adults age 54-65 with no increased risk factors for colorectal cancer. There are a number of acceptable methods of screening for this type of cancer. Each test has its own benefits and drawbacks. Discuss with your doctor what is most appropriate for you during your annual wellness visit. The different tests include: colonoscopy (considered the best screening method), a fecal occult blood test, a fecal DNA test, and sigmoidoscopy. 
 
-A bone mass density test is recommended when a woman turns 65 to screen for osteoporosis. This test is only recommended one time, as a screening. Some providers will use this same test as a disease monitoring tool if you already have osteoporosis. -Breast cancer screenings are recommended every other year for women of normal risk, age 54-69. 
-Cervical cancer screenings for women over age 72 are only recommended with certain risk factors. Here is a list of your current Health Maintenance items (your personalized list of preventive services) with a due date: 
Health Maintenance Due Topic Date Due  
 Annual Well Visit  04/22/2020

## 2020-12-14 NOTE — PROGRESS NOTES
Antonia Garcia is a 80 y.o. female who presents for routine immunizations. She denies any symptoms , reactions or allergies that would exclude them from being immunized today. Risks and adverse reactions were discussed and the VIS was given to them. All questions were addressed. She was observed for 20 min post injection. There were no reactions observed. Honey Roe

## 2021-01-01 ENCOUNTER — TELEPHONE (OUTPATIENT)
Dept: INTERNAL MEDICINE CLINIC | Age: 86
End: 2021-01-01

## 2021-01-01 DIAGNOSIS — Z76.0 MEDICATION REFILL: ICD-10-CM

## 2021-01-01 DIAGNOSIS — I10 ESSENTIAL HYPERTENSION: Chronic | ICD-10-CM

## 2021-01-01 DIAGNOSIS — F02.80 LATE ONSET ALZHEIMER'S DISEASE WITHOUT BEHAVIORAL DISTURBANCE (HCC): Chronic | ICD-10-CM

## 2021-01-01 DIAGNOSIS — G30.1 LATE ONSET ALZHEIMER'S DISEASE WITHOUT BEHAVIORAL DISTURBANCE (HCC): Chronic | ICD-10-CM

## 2021-01-01 DIAGNOSIS — I48.91 ATRIAL FIBRILLATION, UNSPECIFIED TYPE (HCC): ICD-10-CM

## 2021-01-01 RX ORDER — RIVASTIGMINE 9.5 MG/24H
PATCH, EXTENDED RELEASE TRANSDERMAL
Qty: 30 PATCH | Refills: 5 | Status: SHIPPED | OUTPATIENT
Start: 2021-01-01

## 2021-01-01 RX ORDER — TOLTERODINE 4 MG/1
CAPSULE, EXTENDED RELEASE ORAL
Qty: 30 CAP | Refills: 11 | Status: SHIPPED | OUTPATIENT
Start: 2021-01-01

## 2021-01-01 RX ORDER — OMEPRAZOLE 40 MG/1
CAPSULE, DELAYED RELEASE ORAL
Qty: 30 CAP | Refills: 11 | Status: SHIPPED | OUTPATIENT
Start: 2021-01-01

## 2021-01-01 RX ORDER — AMLODIPINE BESYLATE 10 MG/1
TABLET ORAL
Qty: 30 TAB | Refills: 11 | Status: SHIPPED | OUTPATIENT
Start: 2021-01-01 | End: 2021-01-01

## 2021-01-01 RX ORDER — METOPROLOL TARTRATE 25 MG/1
TABLET, FILM COATED ORAL
Qty: 60 TAB | Refills: 5 | Status: SHIPPED | OUTPATIENT
Start: 2021-01-01

## 2021-01-01 RX ORDER — AMLODIPINE BESYLATE 10 MG/1
TABLET ORAL
Qty: 30 TAB | Refills: 11 | Status: SHIPPED | OUTPATIENT
Start: 2021-01-01

## 2021-01-01 RX ORDER — ERGOCALCIFEROL 1.25 MG/1
CAPSULE ORAL
Qty: 4 CAP | Refills: 11 | Status: SHIPPED | OUTPATIENT
Start: 2021-01-01

## 2021-01-01 RX ORDER — FUROSEMIDE 40 MG/1
TABLET ORAL
Qty: 30 TAB | Refills: 5 | Status: SHIPPED | OUTPATIENT
Start: 2021-01-01

## 2021-02-22 NOTE — TELEPHONE ENCOUNTER
If it is available to them, yes she should get it. There is no contraindication in her history.  So far, among the patients we know who have been vaccinated, the side effects have been very mild and self limited

## 2021-02-22 NOTE — TELEPHONE ENCOUNTER
Patient's son is calling in to get your recommendations as to wether or not Ms. Kemp should get the COVID Vaccine.     Josie Cwhne - [phone]

## 2021-02-24 NOTE — TELEPHONE ENCOUNTER
Spoke with patient son & Daughter  and  2 patient identifiers confirmed. Advised patient daughter & son  of information below. Patient understood and had no further questions.

## 2021-06-29 PROBLEM — J96.91 RESPIRATORY FAILURE WITH HYPOXIA (HCC): Status: ACTIVE | Noted: 2021-01-01

## 2021-06-29 PROBLEM — J18.9 PNEUMONIA: Status: ACTIVE | Noted: 2021-01-01

## 2021-06-29 PROBLEM — R41.82 ALTERED MENTAL STATUS: Status: ACTIVE | Noted: 2021-01-01

## 2021-06-29 PROBLEM — R77.8 ELEVATED TROPONIN: Status: ACTIVE | Noted: 2021-01-01

## 2024-09-27 NOTE — TELEPHONE ENCOUNTER
Enrrique Granda son of patient put in request thru voice mail [phone] 9:21am asking for refill of morphine    Lita Villavicencio  01/26/1926    [phone]  Son Shubham Finder    Medication - morphine    %    ADL - sister helps take care of Mom    Side effects - none
Patient identified using two patient identifiers (name and ); patient advised prescriptions are ready for pick-up. Patient also informed  hours are Mon-Fri [id number]. Patient verbalized understanding.
Reviewed, okay to distribute prescription.
Shanice Pope has called requesting a refill of their controlled medication, morphine sulfate IR 15 mg, for the management of her chronic back pain. Last office visit date: 08/27/19  Last opioid care agreement 03/15/19  Last UDS was done 05/07/19    Date last  was pulled and reviewed : 09/30/19  Last fill date for medication was 08/28/19 for morphine sulfate IR 15 mg #140 tabs    Was the patient compliant when the above report was pulled? yes    Analgesia: pt reports an % pain relief with her current opioid medication regimen     Aberrancies: none noted     ADL's: pt reports that she is able to perform her normal daily tasks because she is taking her medication. Adverse Reaction: none reported by the pt at this time. Provider's last note and plan of care reviewed? yes  Request forwarded to provider for review.
Yes

## 2025-05-05 NOTE — ACP (ADVANCE CARE PLANNING)
Patient do not have an advance care plan and is not interested at this time. Patient verbalized understanding. Oriented - self; Oriented - place; Oriented - time